# Patient Record
Sex: FEMALE | Race: WHITE | NOT HISPANIC OR LATINO | Employment: OTHER | URBAN - METROPOLITAN AREA
[De-identification: names, ages, dates, MRNs, and addresses within clinical notes are randomized per-mention and may not be internally consistent; named-entity substitution may affect disease eponyms.]

---

## 2017-01-01 ENCOUNTER — PATIENT OUTREACH (OUTPATIENT)
Dept: HEALTH INFORMATION MANAGEMENT | Facility: OTHER | Age: 77
End: 2017-01-01

## 2017-01-01 ENCOUNTER — APPOINTMENT (OUTPATIENT)
Dept: RADIOLOGY | Facility: MEDICAL CENTER | Age: 77
End: 2017-01-01
Attending: INTERNAL MEDICINE
Payer: MEDICARE

## 2017-01-01 ENCOUNTER — APPOINTMENT (OUTPATIENT)
Dept: RADIOLOGY | Facility: MEDICAL CENTER | Age: 77
DRG: 189 | End: 2017-01-01
Attending: EMERGENCY MEDICINE
Payer: MEDICARE

## 2017-01-01 ENCOUNTER — RESOLUTE PROFESSIONAL BILLING HOSPITAL PROF FEE (OUTPATIENT)
Dept: HOSPITALIST | Facility: MEDICAL CENTER | Age: 77
End: 2017-01-01
Payer: MEDICARE

## 2017-01-01 ENCOUNTER — HOSPITAL ENCOUNTER (INPATIENT)
Facility: MEDICAL CENTER | Age: 77
LOS: 2 days | DRG: 871 | End: 2017-11-06
Attending: EMERGENCY MEDICINE | Admitting: INTERNAL MEDICINE
Payer: MEDICARE

## 2017-01-01 ENCOUNTER — APPOINTMENT (OUTPATIENT)
Dept: RADIOLOGY | Facility: MEDICAL CENTER | Age: 77
DRG: 871 | End: 2017-01-01
Attending: EMERGENCY MEDICINE
Payer: MEDICARE

## 2017-01-01 ENCOUNTER — OFFICE VISIT (OUTPATIENT)
Dept: PULMONOLOGY | Facility: HOSPICE | Age: 77
End: 2017-01-01
Payer: MEDICARE

## 2017-01-01 ENCOUNTER — APPOINTMENT (OUTPATIENT)
Dept: RADIOLOGY | Facility: MEDICAL CENTER | Age: 77
DRG: 871 | End: 2017-01-01
Attending: FAMILY MEDICINE
Payer: MEDICARE

## 2017-01-01 ENCOUNTER — HOSPITAL ENCOUNTER (OUTPATIENT)
Dept: LAB | Facility: MEDICAL CENTER | Age: 77
End: 2017-08-17
Attending: FAMILY MEDICINE
Payer: MEDICARE

## 2017-01-01 ENCOUNTER — HOSPITAL ENCOUNTER (INPATIENT)
Facility: MEDICAL CENTER | Age: 77
LOS: 7 days | DRG: 190 | End: 2017-09-15
Attending: EMERGENCY MEDICINE | Admitting: INTERNAL MEDICINE
Payer: MEDICARE

## 2017-01-01 ENCOUNTER — HOME HEALTH ADMISSION (OUTPATIENT)
Dept: HOME HEALTH SERVICES | Facility: HOME HEALTHCARE | Age: 77
End: 2017-01-01
Payer: MEDICARE

## 2017-01-01 ENCOUNTER — OFFICE VISIT (OUTPATIENT)
Dept: MEDICAL GROUP | Facility: PHYSICIAN GROUP | Age: 77
End: 2017-01-01
Payer: MEDICARE

## 2017-01-01 ENCOUNTER — APPOINTMENT (OUTPATIENT)
Dept: RADIOLOGY | Facility: MEDICAL CENTER | Age: 77
End: 2017-01-01
Attending: EMERGENCY MEDICINE
Payer: MEDICARE

## 2017-01-01 ENCOUNTER — NON-PROVIDER VISIT (OUTPATIENT)
Dept: PULMONOLOGY | Facility: HOSPICE | Age: 77
End: 2017-01-01
Payer: MEDICARE

## 2017-01-01 ENCOUNTER — HOSPITAL ENCOUNTER (OUTPATIENT)
Facility: MEDICAL CENTER | Age: 77
End: 2017-10-29
Attending: EMERGENCY MEDICINE | Admitting: INTERNAL MEDICINE
Payer: MEDICARE

## 2017-01-01 ENCOUNTER — HOME CARE VISIT (OUTPATIENT)
Dept: HOME HEALTH SERVICES | Facility: HOME HEALTHCARE | Age: 77
End: 2017-01-01

## 2017-01-01 ENCOUNTER — APPOINTMENT (OUTPATIENT)
Dept: RADIOLOGY | Facility: MEDICAL CENTER | Age: 77
DRG: 190 | End: 2017-01-01
Attending: EMERGENCY MEDICINE
Payer: MEDICARE

## 2017-01-01 ENCOUNTER — HOSPITAL ENCOUNTER (OUTPATIENT)
Dept: LAB | Facility: MEDICAL CENTER | Age: 77
End: 2017-08-11
Attending: FAMILY MEDICINE
Payer: MEDICARE

## 2017-01-01 ENCOUNTER — TELEPHONE (OUTPATIENT)
Dept: PULMONOLOGY | Facility: HOSPICE | Age: 77
End: 2017-01-01

## 2017-01-01 ENCOUNTER — HOSPITAL ENCOUNTER (INPATIENT)
Facility: MEDICAL CENTER | Age: 77
LOS: 4 days | DRG: 189 | End: 2017-09-07
Attending: EMERGENCY MEDICINE | Admitting: HOSPITALIST
Payer: MEDICARE

## 2017-01-01 ENCOUNTER — APPOINTMENT (OUTPATIENT)
Dept: RADIOLOGY | Facility: MEDICAL CENTER | Age: 77
DRG: 189 | End: 2017-01-01
Attending: HOSPITALIST
Payer: MEDICARE

## 2017-01-01 ENCOUNTER — TELEPHONE (OUTPATIENT)
Dept: MEDICAL GROUP | Facility: PHYSICIAN GROUP | Age: 77
End: 2017-01-01

## 2017-01-01 ENCOUNTER — TELEPHONE (OUTPATIENT)
Dept: MEDICAL GROUP | Facility: LAB | Age: 77
End: 2017-01-01

## 2017-01-01 ENCOUNTER — TELEPHONE (OUTPATIENT)
Dept: HOSPITALIST | Facility: MEDICAL CENTER | Age: 77
End: 2017-01-01

## 2017-01-01 ENCOUNTER — APPOINTMENT (OUTPATIENT)
Dept: RADIOLOGY | Facility: MEDICAL CENTER | Age: 77
DRG: 190 | End: 2017-01-01
Attending: INTERNAL MEDICINE
Payer: MEDICARE

## 2017-01-01 ENCOUNTER — HOSPITAL ENCOUNTER (EMERGENCY)
Facility: MEDICAL CENTER | Age: 77
DRG: 871 | End: 2017-11-01
Attending: EMERGENCY MEDICINE
Payer: MEDICARE

## 2017-01-01 ENCOUNTER — TELEPHONE (OUTPATIENT)
Dept: MEDICAL GROUP | Facility: MEDICAL CENTER | Age: 77
End: 2017-01-01

## 2017-01-01 VITALS
HEART RATE: 71 BPM | RESPIRATION RATE: 12 BRPM | BODY MASS INDEX: 17.74 KG/M2 | HEIGHT: 67 IN | DIASTOLIC BLOOD PRESSURE: 82 MMHG | WEIGHT: 113 LBS | SYSTOLIC BLOOD PRESSURE: 126 MMHG

## 2017-01-01 VITALS
WEIGHT: 114 LBS | HEART RATE: 88 BPM | TEMPERATURE: 99.2 F | BODY MASS INDEX: 18.99 KG/M2 | SYSTOLIC BLOOD PRESSURE: 150 MMHG | DIASTOLIC BLOOD PRESSURE: 90 MMHG | RESPIRATION RATE: 18 BRPM | OXYGEN SATURATION: 90 % | HEIGHT: 65 IN

## 2017-01-01 VITALS
BODY MASS INDEX: 18.53 KG/M2 | WEIGHT: 115.3 LBS | TEMPERATURE: 100 F | HEIGHT: 66 IN | OXYGEN SATURATION: 97 % | SYSTOLIC BLOOD PRESSURE: 120 MMHG | DIASTOLIC BLOOD PRESSURE: 70 MMHG | HEART RATE: 70 BPM | RESPIRATION RATE: 16 BRPM

## 2017-01-01 VITALS
OXYGEN SATURATION: 95 % | HEART RATE: 92 BPM | RESPIRATION RATE: 18 BRPM | BODY MASS INDEX: 18.66 KG/M2 | TEMPERATURE: 98.7 F | SYSTOLIC BLOOD PRESSURE: 170 MMHG | DIASTOLIC BLOOD PRESSURE: 80 MMHG | WEIGHT: 112 LBS | HEIGHT: 65 IN

## 2017-01-01 VITALS
HEIGHT: 65 IN | OXYGEN SATURATION: 95 % | SYSTOLIC BLOOD PRESSURE: 182 MMHG | RESPIRATION RATE: 16 BRPM | HEART RATE: 81 BPM | WEIGHT: 113 LBS | DIASTOLIC BLOOD PRESSURE: 100 MMHG | TEMPERATURE: 97.2 F | BODY MASS INDEX: 18.83 KG/M2

## 2017-01-01 VITALS
BODY MASS INDEX: 17.06 KG/M2 | WEIGHT: 108.69 LBS | RESPIRATION RATE: 20 BRPM | SYSTOLIC BLOOD PRESSURE: 147 MMHG | TEMPERATURE: 97.2 F | HEIGHT: 67 IN | HEART RATE: 93 BPM | OXYGEN SATURATION: 95 % | DIASTOLIC BLOOD PRESSURE: 69 MMHG

## 2017-01-01 VITALS
DIASTOLIC BLOOD PRESSURE: 72 MMHG | RESPIRATION RATE: 14 BRPM | HEART RATE: 69 BPM | BODY MASS INDEX: 17.68 KG/M2 | SYSTOLIC BLOOD PRESSURE: 124 MMHG | HEIGHT: 66 IN | TEMPERATURE: 97.3 F | WEIGHT: 110 LBS | OXYGEN SATURATION: 96 %

## 2017-01-01 VITALS
WEIGHT: 116 LBS | SYSTOLIC BLOOD PRESSURE: 130 MMHG | HEIGHT: 65 IN | HEART RATE: 66 BPM | OXYGEN SATURATION: 95 % | DIASTOLIC BLOOD PRESSURE: 80 MMHG | TEMPERATURE: 98 F | BODY MASS INDEX: 19.33 KG/M2

## 2017-01-01 VITALS
TEMPERATURE: 98.3 F | HEART RATE: 95 BPM | WEIGHT: 100 LBS | RESPIRATION RATE: 24 BRPM | HEIGHT: 67 IN | SYSTOLIC BLOOD PRESSURE: 144 MMHG | BODY MASS INDEX: 15.7 KG/M2 | OXYGEN SATURATION: 99 % | DIASTOLIC BLOOD PRESSURE: 74 MMHG

## 2017-01-01 VITALS
DIASTOLIC BLOOD PRESSURE: 87 MMHG | HEART RATE: 96 BPM | TEMPERATURE: 98.7 F | BODY MASS INDEX: 16.85 KG/M2 | WEIGHT: 107.58 LBS | OXYGEN SATURATION: 96 % | SYSTOLIC BLOOD PRESSURE: 151 MMHG | RESPIRATION RATE: 16 BRPM

## 2017-01-01 VITALS
BODY MASS INDEX: 16.68 KG/M2 | HEIGHT: 67 IN | WEIGHT: 106.26 LBS | SYSTOLIC BLOOD PRESSURE: 126 MMHG | RESPIRATION RATE: 18 BRPM | TEMPERATURE: 98.9 F | OXYGEN SATURATION: 91 % | DIASTOLIC BLOOD PRESSURE: 61 MMHG | HEART RATE: 87 BPM

## 2017-01-01 VITALS
HEART RATE: 114 BPM | RESPIRATION RATE: 30 BRPM | DIASTOLIC BLOOD PRESSURE: 48 MMHG | OXYGEN SATURATION: 80 % | WEIGHT: 95.24 LBS | TEMPERATURE: 97 F | SYSTOLIC BLOOD PRESSURE: 90 MMHG | HEIGHT: 67 IN | BODY MASS INDEX: 14.95 KG/M2

## 2017-01-01 VITALS
WEIGHT: 104.72 LBS | OXYGEN SATURATION: 99 % | BODY MASS INDEX: 17.45 KG/M2 | DIASTOLIC BLOOD PRESSURE: 60 MMHG | HEIGHT: 65 IN | SYSTOLIC BLOOD PRESSURE: 140 MMHG | HEART RATE: 93 BPM | TEMPERATURE: 98 F

## 2017-01-01 VITALS — WEIGHT: 110 LBS | HEIGHT: 66 IN | BODY MASS INDEX: 17.68 KG/M2

## 2017-01-01 DIAGNOSIS — I10 ESSENTIAL HYPERTENSION: Chronic | ICD-10-CM

## 2017-01-01 DIAGNOSIS — J44.1 COPD EXACERBATION (HCC): ICD-10-CM

## 2017-01-01 DIAGNOSIS — I73.9 CLAUDICATION (HCC): ICD-10-CM

## 2017-01-01 DIAGNOSIS — F41.9 ANXIETY: ICD-10-CM

## 2017-01-01 DIAGNOSIS — J44.9 CHRONIC OBSTRUCTIVE PULMONARY DISEASE, UNSPECIFIED COPD TYPE (HCC): Chronic | ICD-10-CM

## 2017-01-01 DIAGNOSIS — M81.0 OSTEOPOROSIS, POST-MENOPAUSAL: ICD-10-CM

## 2017-01-01 DIAGNOSIS — J96.11 CHRONIC RESPIRATORY FAILURE WITH HYPOXIA (HCC): ICD-10-CM

## 2017-01-01 DIAGNOSIS — E87.29 COMPENSATED RESPIRATORY ACIDOSIS: ICD-10-CM

## 2017-01-01 DIAGNOSIS — I10 ESSENTIAL HYPERTENSION: ICD-10-CM

## 2017-01-01 DIAGNOSIS — J44.1 CHRONIC OBSTRUCTIVE PULMONARY DISEASE WITH ACUTE EXACERBATION (HCC): ICD-10-CM

## 2017-01-01 DIAGNOSIS — R41.0 DELIRIUM: ICD-10-CM

## 2017-01-01 DIAGNOSIS — F17.200 CURRENT SMOKER: Chronic | ICD-10-CM

## 2017-01-01 DIAGNOSIS — E78.5 DYSLIPIDEMIA: ICD-10-CM

## 2017-01-01 DIAGNOSIS — I48.20 CHRONIC ATRIAL FIBRILLATION (HCC): ICD-10-CM

## 2017-01-01 DIAGNOSIS — J44.9 CHRONIC OBSTRUCTIVE PULMONARY DISEASE, UNSPECIFIED COPD TYPE (HCC): ICD-10-CM

## 2017-01-01 DIAGNOSIS — N18.30 CHRONIC KIDNEY DISEASE (CKD), STAGE III (MODERATE) (HCC): Chronic | ICD-10-CM

## 2017-01-01 DIAGNOSIS — J18.9 PNEUMONIA OF RIGHT MIDDLE LOBE DUE TO INFECTIOUS ORGANISM: ICD-10-CM

## 2017-01-01 DIAGNOSIS — I48.91 ATRIAL FIBRILLATION, UNSPECIFIED TYPE (HCC): ICD-10-CM

## 2017-01-01 DIAGNOSIS — R44.3 HALLUCINATION: ICD-10-CM

## 2017-01-01 DIAGNOSIS — Z23 NEED FOR TDAP VACCINATION: ICD-10-CM

## 2017-01-01 DIAGNOSIS — E78.5 HYPERLIPIDEMIA, UNSPECIFIED HYPERLIPIDEMIA TYPE: Chronic | ICD-10-CM

## 2017-01-01 DIAGNOSIS — J44.9 COPD, SEVERE (HCC): ICD-10-CM

## 2017-01-01 DIAGNOSIS — J44.1 ACUTE EXACERBATION OF CHRONIC OBSTRUCTIVE PULMONARY DISEASE (COPD) (HCC): ICD-10-CM

## 2017-01-01 DIAGNOSIS — I73.9 PERIPHERAL VASCULAR DISEASE (HCC): Chronic | ICD-10-CM

## 2017-01-01 DIAGNOSIS — J44.9 END STAGE COPD (HCC): ICD-10-CM

## 2017-01-01 DIAGNOSIS — E87.6 HYPOKALEMIA: ICD-10-CM

## 2017-01-01 DIAGNOSIS — D72.829 LEUKOCYTOSIS, UNSPECIFIED TYPE: ICD-10-CM

## 2017-01-01 DIAGNOSIS — J96.12 CHRONIC RESPIRATORY FAILURE WITH HYPOXIA AND HYPERCAPNIA (HCC): ICD-10-CM

## 2017-01-01 DIAGNOSIS — Z99.81 OXYGEN DEPENDENT: ICD-10-CM

## 2017-01-01 DIAGNOSIS — J96.11 CHRONIC RESPIRATORY FAILURE WITH HYPOXIA AND HYPERCAPNIA (HCC): ICD-10-CM

## 2017-01-01 DIAGNOSIS — R06.00 DYSPNEA, UNSPECIFIED TYPE: ICD-10-CM

## 2017-01-01 DIAGNOSIS — Z00.00 MEDICARE ANNUAL WELLNESS VISIT, SUBSEQUENT: ICD-10-CM

## 2017-01-01 DIAGNOSIS — J96.21 ACUTE ON CHRONIC RESPIRATORY FAILURE WITH HYPOXEMIA (HCC): ICD-10-CM

## 2017-01-01 DIAGNOSIS — J44.0 CHRONIC OBSTRUCTIVE PULMONARY DISEASE WITH ACUTE LOWER RESPIRATORY INFECTION (HCC): ICD-10-CM

## 2017-01-01 LAB
25(OH)D3 SERPL-MCNC: 36 NG/ML (ref 30–100)
ALBUMIN SERPL BCP-MCNC: 3.2 G/DL (ref 3.2–4.9)
ALBUMIN SERPL BCP-MCNC: 3.2 G/DL (ref 3.2–4.9)
ALBUMIN SERPL BCP-MCNC: 3.3 G/DL (ref 3.2–4.9)
ALBUMIN SERPL BCP-MCNC: 3.7 G/DL (ref 3.2–4.9)
ALBUMIN SERPL BCP-MCNC: 3.8 G/DL (ref 3.2–4.9)
ALBUMIN SERPL BCP-MCNC: 3.9 G/DL (ref 3.2–4.9)
ALBUMIN/GLOB SERPL: 1.5 G/DL
ALBUMIN/GLOB SERPL: 1.6 G/DL
ALBUMIN/GLOB SERPL: 1.7 G/DL
ALBUMIN/GLOB SERPL: 1.7 G/DL
ALBUMIN/GLOB SERPL: 1.9 G/DL
ALP SERPL-CCNC: 21 U/L (ref 30–99)
ALP SERPL-CCNC: 22 U/L (ref 30–99)
ALP SERPL-CCNC: 25 U/L (ref 30–99)
ALP SERPL-CCNC: 26 U/L (ref 30–99)
ALP SERPL-CCNC: 26 U/L (ref 30–99)
ALP SERPL-CCNC: 28 U/L (ref 30–99)
ALP SERPL-CCNC: 31 U/L (ref 30–99)
ALP SERPL-CCNC: 32 U/L (ref 30–99)
ALT SERPL-CCNC: 10 U/L (ref 2–50)
ALT SERPL-CCNC: 16 U/L (ref 2–50)
ALT SERPL-CCNC: 16 U/L (ref 2–50)
ALT SERPL-CCNC: 21 U/L (ref 2–50)
ALT SERPL-CCNC: 28 U/L (ref 2–50)
ALT SERPL-CCNC: 8 U/L (ref 2–50)
ALT SERPL-CCNC: 9 U/L (ref 2–50)
ALT SERPL-CCNC: 9 U/L (ref 2–50)
ANION GAP SERPL CALC-SCNC: 10 MMOL/L (ref 0–11.9)
ANION GAP SERPL CALC-SCNC: 11 MMOL/L (ref 0–11.9)
ANION GAP SERPL CALC-SCNC: 11 MMOL/L (ref 0–11.9)
ANION GAP SERPL CALC-SCNC: 12 MMOL/L (ref 0–11.9)
ANION GAP SERPL CALC-SCNC: 4 MMOL/L (ref 0–11.9)
ANION GAP SERPL CALC-SCNC: 5 MMOL/L (ref 0–11.9)
ANION GAP SERPL CALC-SCNC: 5 MMOL/L (ref 0–11.9)
ANION GAP SERPL CALC-SCNC: 6 MMOL/L (ref 0–11.9)
ANION GAP SERPL CALC-SCNC: 7 MMOL/L (ref 0–11.9)
APPEARANCE UR: CLEAR
APPEARANCE UR: CLEAR
APTT PPP: 23.9 SEC (ref 24.7–36)
AST SERPL-CCNC: 12 U/L (ref 12–45)
AST SERPL-CCNC: 15 U/L (ref 12–45)
AST SERPL-CCNC: 16 U/L (ref 12–45)
AST SERPL-CCNC: 16 U/L (ref 12–45)
AST SERPL-CCNC: 20 U/L (ref 12–45)
AST SERPL-CCNC: 24 U/L (ref 12–45)
AST SERPL-CCNC: 27 U/L (ref 12–45)
AST SERPL-CCNC: 28 U/L (ref 12–45)
BACTERIA BLD CULT: ABNORMAL
BACTERIA BLD CULT: ABNORMAL
BACTERIA BLD CULT: NORMAL
BACTERIA UR CULT: NORMAL
BACTERIA UR CULT: NORMAL
BASE EXCESS BLDV CALC-SCNC: 0 MMOL/L
BASOPHILS # BLD AUTO: 0.1 % (ref 0–1.8)
BASOPHILS # BLD AUTO: 0.2 % (ref 0–1.8)
BASOPHILS # BLD AUTO: 0.4 % (ref 0–1.8)
BASOPHILS # BLD AUTO: 0.7 % (ref 0–1.8)
BASOPHILS # BLD: 0.01 K/UL (ref 0–0.12)
BASOPHILS # BLD: 0.02 K/UL (ref 0–0.12)
BASOPHILS # BLD: 0.03 K/UL (ref 0–0.12)
BASOPHILS # BLD: 0.03 K/UL (ref 0–0.12)
BASOPHILS # BLD: 0.07 K/UL (ref 0–0.12)
BILIRUB SERPL-MCNC: 0.5 MG/DL (ref 0.1–1.5)
BILIRUB SERPL-MCNC: 0.6 MG/DL (ref 0.1–1.5)
BILIRUB SERPL-MCNC: 0.7 MG/DL (ref 0.1–1.5)
BILIRUB SERPL-MCNC: 0.8 MG/DL (ref 0.1–1.5)
BILIRUB UR QL STRIP.AUTO: NEGATIVE
BILIRUB UR QL STRIP.AUTO: NEGATIVE
BNP SERPL-MCNC: 173 PG/ML (ref 0–100)
BNP SERPL-MCNC: 223 PG/ML (ref 0–100)
BODY TEMPERATURE: ABNORMAL CENTIGRADE
BUN SERPL-MCNC: 11 MG/DL (ref 8–22)
BUN SERPL-MCNC: 11 MG/DL (ref 8–22)
BUN SERPL-MCNC: 12 MG/DL (ref 8–22)
BUN SERPL-MCNC: 15 MG/DL (ref 8–22)
BUN SERPL-MCNC: 16 MG/DL (ref 8–22)
BUN SERPL-MCNC: 20 MG/DL (ref 8–22)
BUN SERPL-MCNC: 23 MG/DL (ref 8–22)
BUN SERPL-MCNC: 23 MG/DL (ref 8–22)
BUN SERPL-MCNC: 24 MG/DL (ref 8–22)
BUN SERPL-MCNC: 25 MG/DL (ref 8–22)
BUN SERPL-MCNC: 27 MG/DL (ref 8–22)
BUN SERPL-MCNC: 7 MG/DL (ref 8–22)
BUN SERPL-MCNC: 9 MG/DL (ref 8–22)
BUN SERPL-MCNC: 9 MG/DL (ref 8–22)
CALCIUM SERPL-MCNC: 8.2 MG/DL (ref 8.5–10.5)
CALCIUM SERPL-MCNC: 8.4 MG/DL (ref 8.5–10.5)
CALCIUM SERPL-MCNC: 8.6 MG/DL (ref 8.5–10.5)
CALCIUM SERPL-MCNC: 8.6 MG/DL (ref 8.5–10.5)
CALCIUM SERPL-MCNC: 8.7 MG/DL (ref 8.5–10.5)
CALCIUM SERPL-MCNC: 8.8 MG/DL (ref 8.5–10.5)
CALCIUM SERPL-MCNC: 8.8 MG/DL (ref 8.5–10.5)
CALCIUM SERPL-MCNC: 9.1 MG/DL (ref 8.5–10.5)
CALCIUM SERPL-MCNC: 9.2 MG/DL (ref 8.5–10.5)
CALCIUM SERPL-MCNC: 9.2 MG/DL (ref 8.5–10.5)
CALCIUM SERPL-MCNC: 9.4 MG/DL (ref 8.5–10.5)
CALCIUM SERPL-MCNC: 9.6 MG/DL (ref 8.5–10.5)
CALCIUM SERPL-MCNC: 9.7 MG/DL (ref 8.5–10.5)
CALCIUM SERPL-MCNC: 9.8 MG/DL (ref 8.5–10.5)
CHLORIDE SERPL-SCNC: 101 MMOL/L (ref 96–112)
CHLORIDE SERPL-SCNC: 102 MMOL/L (ref 96–112)
CHLORIDE SERPL-SCNC: 106 MMOL/L (ref 96–112)
CHLORIDE SERPL-SCNC: 109 MMOL/L (ref 96–112)
CHLORIDE SERPL-SCNC: 109 MMOL/L (ref 96–112)
CHLORIDE SERPL-SCNC: 84 MMOL/L (ref 96–112)
CHLORIDE SERPL-SCNC: 89 MMOL/L (ref 96–112)
CHLORIDE SERPL-SCNC: 89 MMOL/L (ref 96–112)
CHLORIDE SERPL-SCNC: 91 MMOL/L (ref 96–112)
CHLORIDE SERPL-SCNC: 94 MMOL/L (ref 96–112)
CHLORIDE SERPL-SCNC: 94 MMOL/L (ref 96–112)
CHLORIDE SERPL-SCNC: 95 MMOL/L (ref 96–112)
CHLORIDE SERPL-SCNC: 97 MMOL/L (ref 96–112)
CHLORIDE SERPL-SCNC: 98 MMOL/L (ref 96–112)
CHOLEST SERPL-MCNC: 172 MG/DL (ref 100–199)
CHOLEST SERPL-MCNC: 196 MG/DL (ref 100–199)
CO2 SERPL-SCNC: 20 MMOL/L (ref 20–33)
CO2 SERPL-SCNC: 21 MMOL/L (ref 20–33)
CO2 SERPL-SCNC: 22 MMOL/L (ref 20–33)
CO2 SERPL-SCNC: 22 MMOL/L (ref 20–33)
CO2 SERPL-SCNC: 24 MMOL/L (ref 20–33)
CO2 SERPL-SCNC: 26 MMOL/L (ref 20–33)
CO2 SERPL-SCNC: 29 MMOL/L (ref 20–33)
CO2 SERPL-SCNC: 35 MMOL/L (ref 20–33)
CO2 SERPL-SCNC: 35 MMOL/L (ref 20–33)
CO2 SERPL-SCNC: 41 MMOL/L (ref 20–33)
CO2 SERPL-SCNC: 41 MMOL/L (ref 20–33)
CO2 SERPL-SCNC: 42 MMOL/L (ref 20–33)
COLOR UR: YELLOW
COLOR UR: YELLOW
CREAT SERPL-MCNC: 0.55 MG/DL (ref 0.5–1.4)
CREAT SERPL-MCNC: 0.55 MG/DL (ref 0.5–1.4)
CREAT SERPL-MCNC: 0.56 MG/DL (ref 0.5–1.4)
CREAT SERPL-MCNC: 0.67 MG/DL (ref 0.5–1.4)
CREAT SERPL-MCNC: 0.72 MG/DL (ref 0.5–1.4)
CREAT SERPL-MCNC: 0.73 MG/DL (ref 0.5–1.4)
CREAT SERPL-MCNC: 0.74 MG/DL (ref 0.5–1.4)
CREAT SERPL-MCNC: 0.75 MG/DL (ref 0.5–1.4)
CREAT SERPL-MCNC: 0.83 MG/DL (ref 0.5–1.4)
CREAT SERPL-MCNC: 0.85 MG/DL (ref 0.5–1.4)
CREAT SERPL-MCNC: 0.88 MG/DL (ref 0.5–1.4)
CREAT SERPL-MCNC: 0.93 MG/DL (ref 0.5–1.4)
DEPRECATED D DIMER PPP IA-ACNC: <200 NG/ML(D-DU)
EKG IMPRESSION: NORMAL
EOSINOPHIL # BLD AUTO: 0 K/UL (ref 0–0.51)
EOSINOPHIL # BLD AUTO: 0.01 K/UL (ref 0–0.51)
EOSINOPHIL # BLD AUTO: 0.03 K/UL (ref 0–0.51)
EOSINOPHIL # BLD AUTO: 0.04 K/UL (ref 0–0.51)
EOSINOPHIL # BLD AUTO: 0.15 K/UL (ref 0–0.51)
EOSINOPHIL # BLD AUTO: 0.18 K/UL (ref 0–0.51)
EOSINOPHIL NFR BLD: 0 % (ref 0–6.9)
EOSINOPHIL NFR BLD: 0.3 % (ref 0–6.9)
EOSINOPHIL NFR BLD: 0.3 % (ref 0–6.9)
EOSINOPHIL NFR BLD: 0.9 % (ref 0–6.9)
EOSINOPHIL NFR BLD: 2.2 % (ref 0–6.9)
ERYTHROCYTE [DISTWIDTH] IN BLOOD BY AUTOMATED COUNT: 37.9 FL (ref 35.9–50)
ERYTHROCYTE [DISTWIDTH] IN BLOOD BY AUTOMATED COUNT: 38.5 FL (ref 35.9–50)
ERYTHROCYTE [DISTWIDTH] IN BLOOD BY AUTOMATED COUNT: 38.7 FL (ref 35.9–50)
ERYTHROCYTE [DISTWIDTH] IN BLOOD BY AUTOMATED COUNT: 38.7 FL (ref 35.9–50)
ERYTHROCYTE [DISTWIDTH] IN BLOOD BY AUTOMATED COUNT: 38.9 FL (ref 35.9–50)
ERYTHROCYTE [DISTWIDTH] IN BLOOD BY AUTOMATED COUNT: 39.2 FL (ref 35.9–50)
ERYTHROCYTE [DISTWIDTH] IN BLOOD BY AUTOMATED COUNT: 39.3 FL (ref 35.9–50)
ERYTHROCYTE [DISTWIDTH] IN BLOOD BY AUTOMATED COUNT: 39.3 FL (ref 35.9–50)
ERYTHROCYTE [DISTWIDTH] IN BLOOD BY AUTOMATED COUNT: 39.9 FL (ref 35.9–50)
ERYTHROCYTE [DISTWIDTH] IN BLOOD BY AUTOMATED COUNT: 40.1 FL (ref 35.9–50)
ERYTHROCYTE [DISTWIDTH] IN BLOOD BY AUTOMATED COUNT: 41.8 FL (ref 35.9–50)
EST. AVERAGE GLUCOSE BLD GHB EST-MCNC: 128 MG/DL
FLUAV+FLUBV AG SPEC QL IA: NORMAL
GFR SERPL CREATININE-BSD FRML MDRD: 58 ML/MIN/1.73 M 2
GFR SERPL CREATININE-BSD FRML MDRD: >60 ML/MIN/1.73 M 2
GLOBULIN SER CALC-MCNC: 1.9 G/DL (ref 1.9–3.5)
GLOBULIN SER CALC-MCNC: 1.9 G/DL (ref 1.9–3.5)
GLOBULIN SER CALC-MCNC: 2 G/DL (ref 1.9–3.5)
GLOBULIN SER CALC-MCNC: 2.1 G/DL (ref 1.9–3.5)
GLOBULIN SER CALC-MCNC: 2.3 G/DL (ref 1.9–3.5)
GLOBULIN SER CALC-MCNC: 2.4 G/DL (ref 1.9–3.5)
GLOBULIN SER CALC-MCNC: 2.4 G/DL (ref 1.9–3.5)
GLOBULIN SER CALC-MCNC: 2.5 G/DL (ref 1.9–3.5)
GLUCOSE BLD-MCNC: 109 MG/DL (ref 65–99)
GLUCOSE BLD-MCNC: 120 MG/DL (ref 65–99)
GLUCOSE BLD-MCNC: 121 MG/DL (ref 65–99)
GLUCOSE BLD-MCNC: 124 MG/DL (ref 65–99)
GLUCOSE BLD-MCNC: 126 MG/DL (ref 65–99)
GLUCOSE BLD-MCNC: 127 MG/DL (ref 65–99)
GLUCOSE BLD-MCNC: 128 MG/DL (ref 65–99)
GLUCOSE BLD-MCNC: 131 MG/DL (ref 65–99)
GLUCOSE BLD-MCNC: 133 MG/DL (ref 65–99)
GLUCOSE BLD-MCNC: 133 MG/DL (ref 65–99)
GLUCOSE BLD-MCNC: 137 MG/DL (ref 65–99)
GLUCOSE BLD-MCNC: 143 MG/DL (ref 65–99)
GLUCOSE BLD-MCNC: 145 MG/DL (ref 65–99)
GLUCOSE BLD-MCNC: 148 MG/DL (ref 65–99)
GLUCOSE BLD-MCNC: 154 MG/DL (ref 65–99)
GLUCOSE BLD-MCNC: 158 MG/DL (ref 65–99)
GLUCOSE BLD-MCNC: 162 MG/DL (ref 65–99)
GLUCOSE BLD-MCNC: 169 MG/DL (ref 65–99)
GLUCOSE BLD-MCNC: 170 MG/DL (ref 65–99)
GLUCOSE BLD-MCNC: 171 MG/DL (ref 65–99)
GLUCOSE BLD-MCNC: 178 MG/DL (ref 65–99)
GLUCOSE BLD-MCNC: 186 MG/DL (ref 65–99)
GLUCOSE BLD-MCNC: 82 MG/DL (ref 65–99)
GLUCOSE BLD-MCNC: 83 MG/DL (ref 65–99)
GLUCOSE BLD-MCNC: 87 MG/DL (ref 65–99)
GLUCOSE BLD-MCNC: 87 MG/DL (ref 65–99)
GLUCOSE SERPL-MCNC: 107 MG/DL (ref 65–99)
GLUCOSE SERPL-MCNC: 109 MG/DL (ref 65–99)
GLUCOSE SERPL-MCNC: 123 MG/DL (ref 65–99)
GLUCOSE SERPL-MCNC: 123 MG/DL (ref 65–99)
GLUCOSE SERPL-MCNC: 124 MG/DL (ref 65–99)
GLUCOSE SERPL-MCNC: 129 MG/DL (ref 65–99)
GLUCOSE SERPL-MCNC: 130 MG/DL (ref 65–99)
GLUCOSE SERPL-MCNC: 131 MG/DL (ref 65–99)
GLUCOSE SERPL-MCNC: 138 MG/DL (ref 65–99)
GLUCOSE SERPL-MCNC: 145 MG/DL (ref 65–99)
GLUCOSE SERPL-MCNC: 152 MG/DL (ref 65–99)
GLUCOSE SERPL-MCNC: 152 MG/DL (ref 65–99)
GLUCOSE SERPL-MCNC: 156 MG/DL (ref 65–99)
GLUCOSE SERPL-MCNC: 99 MG/DL (ref 65–99)
GLUCOSE UR STRIP.AUTO-MCNC: NEGATIVE MG/DL
GLUCOSE UR STRIP.AUTO-MCNC: NEGATIVE MG/DL
HBA1C MFR BLD: 6.1 % (ref 0–5.6)
HCO3 BLDV-SCNC: 25 MMOL/L (ref 24–28)
HCT VFR BLD AUTO: 29.8 % (ref 37–47)
HCT VFR BLD AUTO: 32 % (ref 37–47)
HCT VFR BLD AUTO: 33.4 % (ref 37–47)
HCT VFR BLD AUTO: 33.7 % (ref 37–47)
HCT VFR BLD AUTO: 34.2 % (ref 37–47)
HCT VFR BLD AUTO: 34.4 % (ref 37–47)
HCT VFR BLD AUTO: 36.9 % (ref 37–47)
HCT VFR BLD AUTO: 37.1 % (ref 37–47)
HCT VFR BLD AUTO: 37.2 % (ref 37–47)
HCT VFR BLD AUTO: 39.4 % (ref 37–47)
HCT VFR BLD AUTO: 42.8 % (ref 37–47)
HDLC SERPL-MCNC: 59 MG/DL
HDLC SERPL-MCNC: 62 MG/DL
HGB BLD-MCNC: 10.1 G/DL (ref 12–16)
HGB BLD-MCNC: 10.9 G/DL (ref 12–16)
HGB BLD-MCNC: 11.6 G/DL (ref 12–16)
HGB BLD-MCNC: 11.7 G/DL (ref 12–16)
HGB BLD-MCNC: 11.9 G/DL (ref 12–16)
HGB BLD-MCNC: 12.1 G/DL (ref 12–16)
HGB BLD-MCNC: 12.5 G/DL (ref 12–16)
HGB BLD-MCNC: 12.6 G/DL (ref 12–16)
HGB BLD-MCNC: 12.7 G/DL (ref 12–16)
HGB BLD-MCNC: 13.5 G/DL (ref 12–16)
HGB BLD-MCNC: 14.7 G/DL (ref 12–16)
IMM GRANULOCYTES # BLD AUTO: 0.02 K/UL (ref 0–0.11)
IMM GRANULOCYTES # BLD AUTO: 0.04 K/UL (ref 0–0.11)
IMM GRANULOCYTES # BLD AUTO: 0.04 K/UL (ref 0–0.11)
IMM GRANULOCYTES # BLD AUTO: 0.06 K/UL (ref 0–0.11)
IMM GRANULOCYTES # BLD AUTO: 0.06 K/UL (ref 0–0.11)
IMM GRANULOCYTES # BLD AUTO: 0.07 K/UL (ref 0–0.11)
IMM GRANULOCYTES # BLD AUTO: 0.07 K/UL (ref 0–0.11)
IMM GRANULOCYTES # BLD AUTO: 0.09 K/UL (ref 0–0.11)
IMM GRANULOCYTES # BLD AUTO: 0.1 K/UL (ref 0–0.11)
IMM GRANULOCYTES # BLD AUTO: 0.17 K/UL (ref 0–0.11)
IMM GRANULOCYTES # BLD AUTO: 0.21 K/UL (ref 0–0.11)
IMM GRANULOCYTES NFR BLD AUTO: 0.3 % (ref 0–0.9)
IMM GRANULOCYTES NFR BLD AUTO: 0.4 % (ref 0–0.9)
IMM GRANULOCYTES NFR BLD AUTO: 0.4 % (ref 0–0.9)
IMM GRANULOCYTES NFR BLD AUTO: 0.6 % (ref 0–0.9)
IMM GRANULOCYTES NFR BLD AUTO: 0.7 % (ref 0–0.9)
IMM GRANULOCYTES NFR BLD AUTO: 0.7 % (ref 0–0.9)
IMM GRANULOCYTES NFR BLD AUTO: 0.8 % (ref 0–0.9)
IMM GRANULOCYTES NFR BLD AUTO: 0.8 % (ref 0–0.9)
IMM GRANULOCYTES NFR BLD AUTO: 1 % (ref 0–0.9)
INR PPP: 0.94 (ref 0.87–1.13)
IRON SATN MFR SERPL: 19 % (ref 15–55)
IRON SERPL-MCNC: 55 UG/DL (ref 40–170)
KETONES UR STRIP.AUTO-MCNC: ABNORMAL MG/DL
KETONES UR STRIP.AUTO-MCNC: ABNORMAL MG/DL
LACTATE BLD-SCNC: 1 MMOL/L (ref 0.5–2)
LACTATE BLD-SCNC: 1.2 MMOL/L (ref 0.5–2)
LACTATE BLD-SCNC: 1.3 MMOL/L (ref 0.5–2)
LACTATE BLD-SCNC: 1.5 MMOL/L (ref 0.5–2)
LACTATE BLD-SCNC: 2.4 MMOL/L (ref 0.5–2)
LDLC SERPL CALC-MCNC: 115 MG/DL
LDLC SERPL CALC-MCNC: 96 MG/DL
LEUKOCYTE ESTERASE UR QL STRIP.AUTO: NEGATIVE
LEUKOCYTE ESTERASE UR QL STRIP.AUTO: NEGATIVE
LV EJECT FRACT  99904: 70
LV EJECT FRACT MOD 4C 99902: 69.19
LYMPHOCYTES # BLD AUTO: 0.56 K/UL (ref 1–4.8)
LYMPHOCYTES # BLD AUTO: 0.65 K/UL (ref 1–4.8)
LYMPHOCYTES # BLD AUTO: 0.73 K/UL (ref 1–4.8)
LYMPHOCYTES # BLD AUTO: 0.75 K/UL (ref 1–4.8)
LYMPHOCYTES # BLD AUTO: 0.84 K/UL (ref 1–4.8)
LYMPHOCYTES # BLD AUTO: 0.87 K/UL (ref 1–4.8)
LYMPHOCYTES # BLD AUTO: 1.14 K/UL (ref 1–4.8)
LYMPHOCYTES # BLD AUTO: 1.27 K/UL (ref 1–4.8)
LYMPHOCYTES # BLD AUTO: 1.51 K/UL (ref 1–4.8)
LYMPHOCYTES # BLD AUTO: 1.56 K/UL (ref 1–4.8)
LYMPHOCYTES # BLD AUTO: 2.25 K/UL (ref 1–4.8)
LYMPHOCYTES NFR BLD: 12 % (ref 22–41)
LYMPHOCYTES NFR BLD: 15.6 % (ref 22–41)
LYMPHOCYTES NFR BLD: 15.7 % (ref 22–41)
LYMPHOCYTES NFR BLD: 23 % (ref 22–41)
LYMPHOCYTES NFR BLD: 4.3 % (ref 22–41)
LYMPHOCYTES NFR BLD: 5.7 % (ref 22–41)
LYMPHOCYTES NFR BLD: 5.9 % (ref 22–41)
LYMPHOCYTES NFR BLD: 6.3 % (ref 22–41)
LYMPHOCYTES NFR BLD: 6.4 % (ref 22–41)
LYMPHOCYTES NFR BLD: 7.6 % (ref 22–41)
LYMPHOCYTES NFR BLD: 8.2 % (ref 22–41)
MAGNESIUM SERPL-MCNC: 1.4 MG/DL (ref 1.5–2.5)
MAGNESIUM SERPL-MCNC: 1.5 MG/DL (ref 1.5–2.5)
MAGNESIUM SERPL-MCNC: 1.6 MG/DL (ref 1.5–2.5)
MAGNESIUM SERPL-MCNC: 2.2 MG/DL (ref 1.5–2.5)
MCH RBC QN AUTO: 29.3 PG (ref 27–33)
MCH RBC QN AUTO: 29.5 PG (ref 27–33)
MCH RBC QN AUTO: 29.5 PG (ref 27–33)
MCH RBC QN AUTO: 29.7 PG (ref 27–33)
MCH RBC QN AUTO: 29.8 PG (ref 27–33)
MCH RBC QN AUTO: 30.1 PG (ref 27–33)
MCH RBC QN AUTO: 30.1 PG (ref 27–33)
MCH RBC QN AUTO: 30.2 PG (ref 27–33)
MCH RBC QN AUTO: 30.3 PG (ref 27–33)
MCH RBC QN AUTO: 30.4 PG (ref 27–33)
MCH RBC QN AUTO: 30.6 PG (ref 27–33)
MCHC RBC AUTO-ENTMCNC: 33.6 G/DL (ref 33.6–35)
MCHC RBC AUTO-ENTMCNC: 33.9 G/DL (ref 33.6–35)
MCHC RBC AUTO-ENTMCNC: 34.1 G/DL (ref 33.6–35)
MCHC RBC AUTO-ENTMCNC: 34.1 G/DL (ref 33.6–35)
MCHC RBC AUTO-ENTMCNC: 34.2 G/DL (ref 33.6–35)
MCHC RBC AUTO-ENTMCNC: 34.3 G/DL (ref 33.6–35)
MCHC RBC AUTO-ENTMCNC: 34.3 G/DL (ref 33.6–35)
MCHC RBC AUTO-ENTMCNC: 34.4 G/DL (ref 33.6–35)
MCHC RBC AUTO-ENTMCNC: 34.8 G/DL (ref 33.6–35)
MCHC RBC AUTO-ENTMCNC: 35 G/DL (ref 33.6–35)
MCHC RBC AUTO-ENTMCNC: 35.2 G/DL (ref 33.6–35)
MCV RBC AUTO: 85.8 FL (ref 81.4–97.8)
MCV RBC AUTO: 85.9 FL (ref 81.4–97.8)
MCV RBC AUTO: 86.3 FL (ref 81.4–97.8)
MCV RBC AUTO: 86.4 FL (ref 81.4–97.8)
MCV RBC AUTO: 86.9 FL (ref 81.4–97.8)
MCV RBC AUTO: 87 FL (ref 81.4–97.8)
MCV RBC AUTO: 87 FL (ref 81.4–97.8)
MCV RBC AUTO: 87.1 FL (ref 81.4–97.8)
MCV RBC AUTO: 88.5 FL (ref 81.4–97.8)
MCV RBC AUTO: 89 FL (ref 81.4–97.8)
MCV RBC AUTO: 89.4 FL (ref 81.4–97.8)
MICRO URNS: ABNORMAL
MICRO URNS: ABNORMAL
MONOCYTES # BLD AUTO: 0.09 K/UL (ref 0–0.85)
MONOCYTES # BLD AUTO: 0.12 K/UL (ref 0–0.85)
MONOCYTES # BLD AUTO: 0.15 K/UL (ref 0–0.85)
MONOCYTES # BLD AUTO: 0.27 K/UL (ref 0–0.85)
MONOCYTES # BLD AUTO: 0.5 K/UL (ref 0–0.85)
MONOCYTES # BLD AUTO: 0.56 K/UL (ref 0–0.85)
MONOCYTES # BLD AUTO: 0.63 K/UL (ref 0–0.85)
MONOCYTES # BLD AUTO: 0.8 K/UL (ref 0–0.85)
MONOCYTES # BLD AUTO: 1.32 K/UL (ref 0–0.85)
MONOCYTES # BLD AUTO: 1.39 K/UL (ref 0–0.85)
MONOCYTES # BLD AUTO: 1.54 K/UL (ref 0–0.85)
MONOCYTES NFR BLD AUTO: 0.9 % (ref 0–13.4)
MONOCYTES NFR BLD AUTO: 1.3 % (ref 0–13.4)
MONOCYTES NFR BLD AUTO: 1.4 % (ref 0–13.4)
MONOCYTES NFR BLD AUTO: 2.4 % (ref 0–13.4)
MONOCYTES NFR BLD AUTO: 4.2 % (ref 0–13.4)
MONOCYTES NFR BLD AUTO: 5.8 % (ref 0–13.4)
MONOCYTES NFR BLD AUTO: 6.6 % (ref 0–13.4)
MONOCYTES NFR BLD AUTO: 7.6 % (ref 0–13.4)
MONOCYTES NFR BLD AUTO: 7.6 % (ref 0–13.4)
MONOCYTES NFR BLD AUTO: 9.3 % (ref 0–13.4)
MONOCYTES NFR BLD AUTO: 9.7 % (ref 0–13.4)
NEUTROPHILS # BLD AUTO: 10.44 K/UL (ref 2–7.15)
NEUTROPHILS # BLD AUTO: 10.58 K/UL (ref 2–7.15)
NEUTROPHILS # BLD AUTO: 17.06 K/UL (ref 2–7.15)
NEUTROPHILS # BLD AUTO: 17.68 K/UL (ref 2–7.15)
NEUTROPHILS # BLD AUTO: 4.4 K/UL (ref 2–7.15)
NEUTROPHILS # BLD AUTO: 7.5 K/UL (ref 2–7.15)
NEUTROPHILS # BLD AUTO: 8.02 K/UL (ref 2–7.15)
NEUTROPHILS # BLD AUTO: 8.39 K/UL (ref 2–7.15)
NEUTROPHILS # BLD AUTO: 8.78 K/UL (ref 2–7.15)
NEUTROPHILS # BLD AUTO: 9.55 K/UL (ref 2–7.15)
NEUTROPHILS # BLD AUTO: 9.85 K/UL (ref 2–7.15)
NEUTROPHILS NFR BLD: 64.8 % (ref 44–72)
NEUTROPHILS NFR BLD: 73.6 % (ref 44–72)
NEUTROPHILS NFR BLD: 77.2 % (ref 44–72)
NEUTROPHILS NFR BLD: 79.6 % (ref 44–72)
NEUTROPHILS NFR BLD: 84.7 % (ref 44–72)
NEUTROPHILS NFR BLD: 88.2 % (ref 44–72)
NEUTROPHILS NFR BLD: 88.5 % (ref 44–72)
NEUTROPHILS NFR BLD: 89.3 % (ref 44–72)
NEUTROPHILS NFR BLD: 89.6 % (ref 44–72)
NEUTROPHILS NFR BLD: 91.8 % (ref 44–72)
NEUTROPHILS NFR BLD: 92.5 % (ref 44–72)
NITRITE UR QL STRIP.AUTO: NEGATIVE
NITRITE UR QL STRIP.AUTO: NEGATIVE
NRBC # BLD AUTO: 0 K/UL
NRBC # BLD AUTO: 0.02 K/UL
NRBC BLD AUTO-RTO: 0 /100 WBC
NRBC BLD AUTO-RTO: 0.2 /100 WBC
PCO2 BLDV: 45.1 MMHG (ref 41–51)
PH BLDV: 7.37 [PH] (ref 7.31–7.45)
PH UR STRIP.AUTO: 7.5 [PH]
PH UR STRIP.AUTO: 8 [PH]
PLATELET # BLD AUTO: 173 K/UL (ref 164–446)
PLATELET # BLD AUTO: 190 K/UL (ref 164–446)
PLATELET # BLD AUTO: 192 K/UL (ref 164–446)
PLATELET # BLD AUTO: 204 K/UL (ref 164–446)
PLATELET # BLD AUTO: 225 K/UL (ref 164–446)
PLATELET # BLD AUTO: 226 K/UL (ref 164–446)
PLATELET # BLD AUTO: 226 K/UL (ref 164–446)
PLATELET # BLD AUTO: 229 K/UL (ref 164–446)
PLATELET # BLD AUTO: 232 K/UL (ref 164–446)
PLATELET # BLD AUTO: 308 K/UL (ref 164–446)
PLATELET # BLD AUTO: 309 K/UL (ref 164–446)
PMV BLD AUTO: 8.6 FL (ref 9–12.9)
PMV BLD AUTO: 8.7 FL (ref 9–12.9)
PMV BLD AUTO: 8.8 FL (ref 9–12.9)
PMV BLD AUTO: 9.1 FL (ref 9–12.9)
PMV BLD AUTO: 9.2 FL (ref 9–12.9)
PMV BLD AUTO: 9.3 FL (ref 9–12.9)
PMV BLD AUTO: 9.3 FL (ref 9–12.9)
PMV BLD AUTO: 9.6 FL (ref 9–12.9)
PMV BLD AUTO: 9.7 FL (ref 9–12.9)
PO2 BLDV: 23.2 MMHG (ref 25–40)
POTASSIUM SERPL-SCNC: 2.6 MMOL/L (ref 3.6–5.5)
POTASSIUM SERPL-SCNC: 3.1 MMOL/L (ref 3.6–5.5)
POTASSIUM SERPL-SCNC: 3.3 MMOL/L (ref 3.6–5.5)
POTASSIUM SERPL-SCNC: 3.4 MMOL/L (ref 3.6–5.5)
POTASSIUM SERPL-SCNC: 3.5 MMOL/L (ref 3.6–5.5)
POTASSIUM SERPL-SCNC: 3.6 MMOL/L (ref 3.6–5.5)
POTASSIUM SERPL-SCNC: 3.7 MMOL/L (ref 3.6–5.5)
POTASSIUM SERPL-SCNC: 3.8 MMOL/L (ref 3.6–5.5)
POTASSIUM SERPL-SCNC: 3.9 MMOL/L (ref 3.6–5.5)
POTASSIUM SERPL-SCNC: 4.1 MMOL/L (ref 3.6–5.5)
POTASSIUM SERPL-SCNC: 4.1 MMOL/L (ref 3.6–5.5)
POTASSIUM SERPL-SCNC: 4.4 MMOL/L (ref 3.6–5.5)
POTASSIUM SERPL-SCNC: 4.6 MMOL/L (ref 3.6–5.5)
POTASSIUM SERPL-SCNC: 4.7 MMOL/L (ref 3.6–5.5)
PROCALCITONIN SERPL-MCNC: <0.05 NG/ML
PROT SERPL-MCNC: 5.1 G/DL (ref 6–8.2)
PROT SERPL-MCNC: 5.1 G/DL (ref 6–8.2)
PROT SERPL-MCNC: 5.4 G/DL (ref 6–8.2)
PROT SERPL-MCNC: 5.7 G/DL (ref 6–8.2)
PROT SERPL-MCNC: 6 G/DL (ref 6–8.2)
PROT SERPL-MCNC: 6.1 G/DL (ref 6–8.2)
PROT SERPL-MCNC: 6.2 G/DL (ref 6–8.2)
PROT SERPL-MCNC: 6.4 G/DL (ref 6–8.2)
PROT UR QL STRIP: NEGATIVE MG/DL
PROT UR QL STRIP: NEGATIVE MG/DL
PROTHROMBIN TIME: 12.3 SEC (ref 12–14.6)
RBC # BLD AUTO: 3.35 M/UL (ref 4.2–5.4)
RBC # BLD AUTO: 3.58 M/UL (ref 4.2–5.4)
RBC # BLD AUTO: 3.89 M/UL (ref 4.2–5.4)
RBC # BLD AUTO: 3.9 M/UL (ref 4.2–5.4)
RBC # BLD AUTO: 3.93 M/UL (ref 4.2–5.4)
RBC # BLD AUTO: 3.96 M/UL (ref 4.2–5.4)
RBC # BLD AUTO: 4.17 M/UL (ref 4.2–5.4)
RBC # BLD AUTO: 4.27 M/UL (ref 4.2–5.4)
RBC # BLD AUTO: 4.3 M/UL (ref 4.2–5.4)
RBC # BLD AUTO: 4.53 M/UL (ref 4.2–5.4)
RBC # BLD AUTO: 4.99 M/UL (ref 4.2–5.4)
RBC UR QL AUTO: NEGATIVE
RBC UR QL AUTO: NEGATIVE
SAO2 % BLDV: 39 %
SIGNIFICANT IND 70042: ABNORMAL
SIGNIFICANT IND 70042: NORMAL
SITE SITE: ABNORMAL
SITE SITE: NORMAL
SODIUM SERPL-SCNC: 128 MMOL/L (ref 135–145)
SODIUM SERPL-SCNC: 129 MMOL/L (ref 135–145)
SODIUM SERPL-SCNC: 129 MMOL/L (ref 135–145)
SODIUM SERPL-SCNC: 130 MMOL/L (ref 135–145)
SODIUM SERPL-SCNC: 131 MMOL/L (ref 135–145)
SODIUM SERPL-SCNC: 131 MMOL/L (ref 135–145)
SODIUM SERPL-SCNC: 132 MMOL/L (ref 135–145)
SODIUM SERPL-SCNC: 133 MMOL/L (ref 135–145)
SODIUM SERPL-SCNC: 133 MMOL/L (ref 135–145)
SODIUM SERPL-SCNC: 134 MMOL/L (ref 135–145)
SODIUM SERPL-SCNC: 134 MMOL/L (ref 135–145)
SODIUM SERPL-SCNC: 136 MMOL/L (ref 135–145)
SODIUM SERPL-SCNC: 139 MMOL/L (ref 135–145)
SODIUM SERPL-SCNC: 141 MMOL/L (ref 135–145)
SOURCE SOURCE: ABNORMAL
SOURCE SOURCE: NORMAL
SP GR UR STRIP.AUTO: 1.01
SP GR UR STRIP.AUTO: 1.01
TIBC SERPL-MCNC: 291 UG/DL (ref 250–450)
TRIGL SERPL-MCNC: 112 MG/DL (ref 0–149)
TRIGL SERPL-MCNC: 70 MG/DL (ref 0–149)
TROPONIN I SERPL-MCNC: <0.01 NG/ML (ref 0–0.04)
TSH SERPL DL<=0.005 MIU/L-ACNC: 1.33 UIU/ML (ref 0.3–3.7)
TSH SERPL DL<=0.005 MIU/L-ACNC: 1.53 UIU/ML (ref 0.3–3.7)
UROBILINOGEN UR STRIP.AUTO-MCNC: 0.2 MG/DL
UROBILINOGEN UR STRIP.AUTO-MCNC: 0.2 MG/DL
WBC # BLD AUTO: 10.4 K/UL (ref 4.8–10.8)
WBC # BLD AUTO: 10.5 K/UL (ref 4.8–10.8)
WBC # BLD AUTO: 11 K/UL (ref 4.8–10.8)
WBC # BLD AUTO: 11.8 K/UL (ref 4.8–10.8)
WBC # BLD AUTO: 14.4 K/UL (ref 4.8–10.8)
WBC # BLD AUTO: 20.1 K/UL (ref 4.8–10.8)
WBC # BLD AUTO: 20.2 K/UL (ref 4.8–10.8)
WBC # BLD AUTO: 6.8 K/UL (ref 4.8–10.8)
WBC # BLD AUTO: 9 K/UL (ref 4.8–10.8)
WBC # BLD AUTO: 9.5 K/UL (ref 4.8–10.8)
WBC # BLD AUTO: 9.7 K/UL (ref 4.8–10.8)

## 2017-01-01 PROCEDURE — 93010 ELECTROCARDIOGRAM REPORT: CPT | Performed by: INTERNAL MEDICINE

## 2017-01-01 PROCEDURE — 99239 HOSP IP/OBS DSCHRG MGMT >30: CPT | Performed by: HOSPITALIST

## 2017-01-01 PROCEDURE — 94760 N-INVAS EAR/PLS OXIMETRY 1: CPT

## 2017-01-01 PROCEDURE — 93005 ELECTROCARDIOGRAM TRACING: CPT | Performed by: EMERGENCY MEDICINE

## 2017-01-01 PROCEDURE — 85025 COMPLETE CBC W/AUTO DIFF WBC: CPT

## 2017-01-01 PROCEDURE — 99214 OFFICE O/P EST MOD 30 MIN: CPT | Performed by: NURSE PRACTITIONER

## 2017-01-01 PROCEDURE — 84443 ASSAY THYROID STIM HORMONE: CPT

## 2017-01-01 PROCEDURE — 700111 HCHG RX REV CODE 636 W/ 250 OVERRIDE (IP): Performed by: HOSPITALIST

## 2017-01-01 PROCEDURE — 83735 ASSAY OF MAGNESIUM: CPT

## 2017-01-01 PROCEDURE — 97161 PT EVAL LOW COMPLEX 20 MIN: CPT

## 2017-01-01 PROCEDURE — 83036 HEMOGLOBIN GLYCOSYLATED A1C: CPT

## 2017-01-01 PROCEDURE — 700102 HCHG RX REV CODE 250 W/ 637 OVERRIDE(OP): Performed by: EMERGENCY MEDICINE

## 2017-01-01 PROCEDURE — 93005 ELECTROCARDIOGRAM TRACING: CPT | Performed by: INTERNAL MEDICINE

## 2017-01-01 PROCEDURE — 304562 HCHG STAT O2 MASK/CANNULA

## 2017-01-01 PROCEDURE — 94640 AIRWAY INHALATION TREATMENT: CPT

## 2017-01-01 PROCEDURE — 84484 ASSAY OF TROPONIN QUANT: CPT

## 2017-01-01 PROCEDURE — 700101 HCHG RX REV CODE 250: Performed by: INTERNAL MEDICINE

## 2017-01-01 PROCEDURE — G8432 DEP SCR NOT DOC, RNG: HCPCS | Performed by: NURSE PRACTITIONER

## 2017-01-01 PROCEDURE — 700102 HCHG RX REV CODE 250 W/ 637 OVERRIDE(OP): Performed by: INTERNAL MEDICINE

## 2017-01-01 PROCEDURE — 700111 HCHG RX REV CODE 636 W/ 250 OVERRIDE (IP): Performed by: INTERNAL MEDICINE

## 2017-01-01 PROCEDURE — 36415 COLL VENOUS BLD VENIPUNCTURE: CPT

## 2017-01-01 PROCEDURE — A9270 NON-COVERED ITEM OR SERVICE: HCPCS | Performed by: HOSPITALIST

## 2017-01-01 PROCEDURE — 99232 SBSQ HOSP IP/OBS MODERATE 35: CPT | Performed by: INTERNAL MEDICINE

## 2017-01-01 PROCEDURE — G0378 HOSPITAL OBSERVATION PER HR: HCPCS

## 2017-01-01 PROCEDURE — 82962 GLUCOSE BLOOD TEST: CPT

## 2017-01-01 PROCEDURE — 770020 HCHG ROOM/CARE - TELE (206)

## 2017-01-01 PROCEDURE — 700105 HCHG RX REV CODE 258: Performed by: INTERNAL MEDICINE

## 2017-01-01 PROCEDURE — 700102 HCHG RX REV CODE 250 W/ 637 OVERRIDE(OP): Performed by: HOSPITALIST

## 2017-01-01 PROCEDURE — A9270 NON-COVERED ITEM OR SERVICE: HCPCS | Performed by: INTERNAL MEDICINE

## 2017-01-01 PROCEDURE — 5A0955Z ASSISTANCE WITH RESPIRATORY VENTILATION, GREATER THAN 96 CONSECUTIVE HOURS: ICD-10-PCS | Performed by: EMERGENCY MEDICINE

## 2017-01-01 PROCEDURE — 700105 HCHG RX REV CODE 258: Performed by: EMERGENCY MEDICINE

## 2017-01-01 PROCEDURE — 304561 HCHG STAT O2

## 2017-01-01 PROCEDURE — 700101 HCHG RX REV CODE 250: Performed by: HOSPITALIST

## 2017-01-01 PROCEDURE — 80048 BASIC METABOLIC PNL TOTAL CA: CPT

## 2017-01-01 PROCEDURE — 80053 COMPREHEN METABOLIC PANEL: CPT

## 2017-01-01 PROCEDURE — G8482 FLU IMMUNIZE ORDER/ADMIN: HCPCS | Performed by: NURSE PRACTITIONER

## 2017-01-01 PROCEDURE — 90662 IIV NO PRSV INCREASED AG IM: CPT | Performed by: HOSPITALIST

## 2017-01-01 PROCEDURE — 99285 EMERGENCY DEPT VISIT HI MDM: CPT

## 2017-01-01 PROCEDURE — 99232 SBSQ HOSP IP/OBS MODERATE 35: CPT | Performed by: HOSPITALIST

## 2017-01-01 PROCEDURE — 71010 DX-CHEST-PORTABLE (1 VIEW): CPT

## 2017-01-01 PROCEDURE — 96360 HYDRATION IV INFUSION INIT: CPT

## 2017-01-01 PROCEDURE — A9270 NON-COVERED ITEM OR SERVICE: HCPCS | Performed by: EMERGENCY MEDICINE

## 2017-01-01 PROCEDURE — 71020 DX-CHEST-2 VIEWS: CPT

## 2017-01-01 PROCEDURE — G8988 SELF CARE GOAL STATUS: HCPCS | Mod: CI

## 2017-01-01 PROCEDURE — 83605 ASSAY OF LACTIC ACID: CPT | Mod: 91

## 2017-01-01 PROCEDURE — 700111 HCHG RX REV CODE 636 W/ 250 OVERRIDE (IP): Performed by: EMERGENCY MEDICINE

## 2017-01-01 PROCEDURE — 99233 SBSQ HOSP IP/OBS HIGH 50: CPT | Performed by: HOSPITALIST

## 2017-01-01 PROCEDURE — 96374 THER/PROPH/DIAG INJ IV PUSH: CPT

## 2017-01-01 PROCEDURE — G8978 MOBILITY CURRENT STATUS: HCPCS | Mod: CK

## 2017-01-01 PROCEDURE — 96365 THER/PROPH/DIAG IV INF INIT: CPT

## 2017-01-01 PROCEDURE — 99213 OFFICE O/P EST LOW 20 MIN: CPT | Performed by: INTERNAL MEDICINE

## 2017-01-01 PROCEDURE — 99214 OFFICE O/P EST MOD 30 MIN: CPT | Performed by: FAMILY MEDICINE

## 2017-01-01 PROCEDURE — 1101F PT FALLS ASSESS-DOCD LE1/YR: CPT | Performed by: NURSE PRACTITIONER

## 2017-01-01 PROCEDURE — G8979 MOBILITY GOAL STATUS: HCPCS | Mod: CI

## 2017-01-01 PROCEDURE — 96368 THER/DIAG CONCURRENT INF: CPT

## 2017-01-01 PROCEDURE — 302129 PCA PLUS: Performed by: INTERNAL MEDICINE

## 2017-01-01 PROCEDURE — 87086 URINE CULTURE/COLONY COUNT: CPT

## 2017-01-01 PROCEDURE — 93005 ELECTROCARDIOGRAM TRACING: CPT

## 2017-01-01 PROCEDURE — 80061 LIPID PANEL: CPT

## 2017-01-01 PROCEDURE — 99223 1ST HOSP IP/OBS HIGH 75: CPT | Mod: AI | Performed by: INTERNAL MEDICINE

## 2017-01-01 PROCEDURE — G8419 CALC BMI OUT NRM PARAM NOF/U: HCPCS | Performed by: FAMILY MEDICINE

## 2017-01-01 PROCEDURE — G8419 CALC BMI OUT NRM PARAM NOF/U: HCPCS | Performed by: INTERNAL MEDICINE

## 2017-01-01 PROCEDURE — 700111 HCHG RX REV CODE 636 W/ 250 OVERRIDE (IP)

## 2017-01-01 PROCEDURE — 97165 OT EVAL LOW COMPLEX 30 MIN: CPT

## 2017-01-01 PROCEDURE — 94761 N-INVAS EAR/PLS OXIMETRY MLT: CPT | Performed by: NURSE PRACTITIONER

## 2017-01-01 PROCEDURE — 82962 GLUCOSE BLOOD TEST: CPT | Mod: 91

## 2017-01-01 PROCEDURE — 82306 VITAMIN D 25 HYDROXY: CPT

## 2017-01-01 PROCEDURE — 3E0234Z INTRODUCTION OF SERUM, TOXOID AND VACCINE INTO MUSCLE, PERCUTANEOUS APPROACH: ICD-10-PCS | Performed by: HOSPITALIST

## 2017-01-01 PROCEDURE — 96375 TX/PRO/DX INJ NEW DRUG ADDON: CPT

## 2017-01-01 PROCEDURE — G8979 MOBILITY GOAL STATUS: HCPCS | Mod: CK

## 2017-01-01 PROCEDURE — 99407 BEHAV CHNG SMOKING > 10 MIN: CPT | Performed by: INTERNAL MEDICINE

## 2017-01-01 PROCEDURE — 87400 INFLUENZA A/B EACH AG IA: CPT

## 2017-01-01 PROCEDURE — 81003 URINALYSIS AUTO W/O SCOPE: CPT

## 2017-01-01 PROCEDURE — 99223 1ST HOSP IP/OBS HIGH 75: CPT | Mod: 25 | Performed by: INTERNAL MEDICINE

## 2017-01-01 PROCEDURE — G8510 SCR DEP NEG, NO PLAN REQD: HCPCS | Performed by: FAMILY MEDICINE

## 2017-01-01 PROCEDURE — 700105 HCHG RX REV CODE 258

## 2017-01-01 PROCEDURE — 99217 PR OBSERVATION CARE DISCHARGE: CPT | Performed by: HOSPITALIST

## 2017-01-01 PROCEDURE — G8482 FLU IMMUNIZE ORDER/ADMIN: HCPCS | Performed by: FAMILY MEDICINE

## 2017-01-01 PROCEDURE — 700101 HCHG RX REV CODE 250: Performed by: EMERGENCY MEDICINE

## 2017-01-01 PROCEDURE — 99406 BEHAV CHNG SMOKING 3-10 MIN: CPT

## 2017-01-01 PROCEDURE — 71250 CT THORAX DX C-: CPT

## 2017-01-01 PROCEDURE — 83605 ASSAY OF LACTIC ACID: CPT

## 2017-01-01 PROCEDURE — 306588 SLEEVE,VASO CALF MED: Performed by: INTERNAL MEDICINE

## 2017-01-01 PROCEDURE — 4040F PNEUMOC VAC/ADMIN/RCVD: CPT | Performed by: FAMILY MEDICINE

## 2017-01-01 PROCEDURE — 4004F PT TOBACCO SCREEN RCVD TLK: CPT | Performed by: NURSE PRACTITIONER

## 2017-01-01 PROCEDURE — 84145 PROCALCITONIN (PCT): CPT

## 2017-01-01 PROCEDURE — 99220 PR INITIAL OBSERVATION CARE,LEVL III: CPT | Mod: 25 | Performed by: INTERNAL MEDICINE

## 2017-01-01 PROCEDURE — 302128 INFUSION PUMP: Performed by: EMERGENCY MEDICINE

## 2017-01-01 PROCEDURE — 4004F PT TOBACCO SCREEN RCVD TLK: CPT | Performed by: FAMILY MEDICINE

## 2017-01-01 PROCEDURE — 1101F PT FALLS ASSESS-DOCD LE1/YR: CPT | Performed by: FAMILY MEDICINE

## 2017-01-01 PROCEDURE — 96367 TX/PROPH/DG ADDL SEQ IV INF: CPT

## 2017-01-01 PROCEDURE — 51701 INSERT BLADDER CATHETER: CPT

## 2017-01-01 PROCEDURE — 82803 BLOOD GASES ANY COMBINATION: CPT

## 2017-01-01 PROCEDURE — 93306 TTE W/DOPPLER COMPLETE: CPT | Mod: 26 | Performed by: INTERNAL MEDICINE

## 2017-01-01 PROCEDURE — G8987 SELF CARE CURRENT STATUS: HCPCS | Mod: CJ

## 2017-01-01 PROCEDURE — 83880 ASSAY OF NATRIURETIC PEPTIDE: CPT

## 2017-01-01 PROCEDURE — G8432 DEP SCR NOT DOC, RNG: HCPCS | Performed by: FAMILY MEDICINE

## 2017-01-01 PROCEDURE — 90715 TDAP VACCINE 7 YRS/> IM: CPT | Performed by: FAMILY MEDICINE

## 2017-01-01 PROCEDURE — 85379 FIBRIN DEGRADATION QUANT: CPT

## 2017-01-01 PROCEDURE — 96372 THER/PROPH/DIAG INJ SC/IM: CPT

## 2017-01-01 PROCEDURE — 99233 SBSQ HOSP IP/OBS HIGH 50: CPT | Performed by: INTERNAL MEDICINE

## 2017-01-01 PROCEDURE — 71010 DX-CHEST-LIMITED (1 VIEW): CPT

## 2017-01-01 PROCEDURE — 770006 HCHG ROOM/CARE - MED/SURG/GYN SEMI*

## 2017-01-01 PROCEDURE — 90471 IMMUNIZATION ADMIN: CPT

## 2017-01-01 PROCEDURE — 99223 1ST HOSP IP/OBS HIGH 75: CPT | Mod: 25 | Performed by: HOSPITALIST

## 2017-01-01 PROCEDURE — 83550 IRON BINDING TEST: CPT

## 2017-01-01 PROCEDURE — 90471 IMMUNIZATION ADMIN: CPT | Performed by: FAMILY MEDICINE

## 2017-01-01 PROCEDURE — 93306 TTE W/DOPPLER COMPLETE: CPT

## 2017-01-01 PROCEDURE — 85610 PROTHROMBIN TIME: CPT

## 2017-01-01 PROCEDURE — G8419 CALC BMI OUT NRM PARAM NOF/U: HCPCS | Performed by: NURSE PRACTITIONER

## 2017-01-01 PROCEDURE — 87040 BLOOD CULTURE FOR BACTERIA: CPT | Mod: 91

## 2017-01-01 PROCEDURE — G8978 MOBILITY CURRENT STATUS: HCPCS | Mod: CJ

## 2017-01-01 PROCEDURE — 1101F PT FALLS ASSESS-DOCD LE1/YR: CPT | Performed by: INTERNAL MEDICINE

## 2017-01-01 PROCEDURE — 4040F PNEUMOC VAC/ADMIN/RCVD: CPT | Performed by: NURSE PRACTITIONER

## 2017-01-01 PROCEDURE — 700111 HCHG RX REV CODE 636 W/ 250 OVERRIDE (IP): Performed by: FAMILY MEDICINE

## 2017-01-01 PROCEDURE — 96361 HYDRATE IV INFUSION ADD-ON: CPT

## 2017-01-01 PROCEDURE — G0439 PPPS, SUBSEQ VISIT: HCPCS | Mod: 25 | Performed by: FAMILY MEDICINE

## 2017-01-01 PROCEDURE — 99406 BEHAV CHNG SMOKING 3-10 MIN: CPT | Performed by: HOSPITALIST

## 2017-01-01 PROCEDURE — 99497 ADVNCD CARE PLAN 30 MIN: CPT | Performed by: INTERNAL MEDICINE

## 2017-01-01 PROCEDURE — 93005 ELECTROCARDIOGRAM TRACING: CPT | Performed by: HOSPITALIST

## 2017-01-01 PROCEDURE — G8980 MOBILITY D/C STATUS: HCPCS | Mod: CK

## 2017-01-01 PROCEDURE — 83540 ASSAY OF IRON: CPT

## 2017-01-01 PROCEDURE — 99204 OFFICE O/P NEW MOD 45 MIN: CPT | Mod: 25 | Performed by: INTERNAL MEDICINE

## 2017-01-01 PROCEDURE — 85730 THROMBOPLASTIN TIME PARTIAL: CPT

## 2017-01-01 RX ORDER — CLONAZEPAM 0.5 MG/1
0.25 TABLET ORAL EVERY 12 HOURS PRN
Status: DISCONTINUED | OUTPATIENT
Start: 2017-01-01 | End: 2017-01-01

## 2017-01-01 RX ORDER — NICOTINE 21 MG/24HR
14 PATCH, TRANSDERMAL 24 HOURS TRANSDERMAL
Status: DISCONTINUED | OUTPATIENT
Start: 2017-01-01 | End: 2017-01-01 | Stop reason: HOSPADM

## 2017-01-01 RX ORDER — ACETAMINOPHEN 325 MG/1
650 TABLET ORAL EVERY 6 HOURS PRN
Status: DISCONTINUED | OUTPATIENT
Start: 2017-01-01 | End: 2017-01-01 | Stop reason: HOSPADM

## 2017-01-01 RX ORDER — ONDANSETRON 4 MG/1
4 TABLET, ORALLY DISINTEGRATING ORAL EVERY 4 HOURS PRN
Status: DISCONTINUED | OUTPATIENT
Start: 2017-01-01 | End: 2017-01-01

## 2017-01-01 RX ORDER — CLONAZEPAM 0.5 MG/1
TABLET ORAL
Qty: 60 TAB | Refills: 0 | Status: SHIPPED
Start: 2017-01-01 | End: 2017-01-01 | Stop reason: SDUPTHER

## 2017-01-01 RX ORDER — BUSPIRONE HYDROCHLORIDE 10 MG/1
5 TABLET ORAL DAILY
Status: DISCONTINUED | OUTPATIENT
Start: 2017-01-01 | End: 2017-01-01 | Stop reason: HOSPADM

## 2017-01-01 RX ORDER — POTASSIUM CHLORIDE 20 MEQ/1
40 TABLET, EXTENDED RELEASE ORAL ONCE
Status: COMPLETED | OUTPATIENT
Start: 2017-01-01 | End: 2017-01-01

## 2017-01-01 RX ORDER — DIPHENHYDRAMINE HYDROCHLORIDE 50 MG/ML
12.5 INJECTION INTRAMUSCULAR; INTRAVENOUS ONCE
Status: COMPLETED | OUTPATIENT
Start: 2017-01-01 | End: 2017-01-01

## 2017-01-01 RX ORDER — HALOPERIDOL 5 MG/ML
2.5 INJECTION INTRAMUSCULAR ONCE
Status: COMPLETED | OUTPATIENT
Start: 2017-01-01 | End: 2017-01-01

## 2017-01-01 RX ORDER — METOPROLOL SUCCINATE 50 MG/1
50 TABLET, EXTENDED RELEASE ORAL
Status: DISCONTINUED | OUTPATIENT
Start: 2017-01-01 | End: 2017-01-01

## 2017-01-01 RX ORDER — HYDRALAZINE HYDROCHLORIDE 20 MG/ML
10 INJECTION INTRAMUSCULAR; INTRAVENOUS EVERY 4 HOURS PRN
Status: DISCONTINUED | OUTPATIENT
Start: 2017-01-01 | End: 2017-01-01 | Stop reason: HOSPADM

## 2017-01-01 RX ORDER — CEFDINIR 300 MG/1
300 CAPSULE ORAL EVERY 12 HOURS
Qty: 6 CAP | Refills: 0 | Status: SHIPPED | OUTPATIENT
Start: 2017-01-01 | End: 2017-01-01

## 2017-01-01 RX ORDER — NICOTINE 21 MG/24HR
21 PATCH, TRANSDERMAL 24 HOURS TRANSDERMAL
Status: DISCONTINUED | OUTPATIENT
Start: 2017-01-01 | End: 2017-01-01 | Stop reason: HOSPADM

## 2017-01-01 RX ORDER — SIMVASTATIN 40 MG
20 TABLET ORAL EVERY EVENING
Status: DISCONTINUED | OUTPATIENT
Start: 2017-01-01 | End: 2017-01-01

## 2017-01-01 RX ORDER — PREDNISONE 20 MG/1
40 TABLET ORAL DAILY
Status: DISCONTINUED | OUTPATIENT
Start: 2017-01-01 | End: 2017-01-01 | Stop reason: HOSPADM

## 2017-01-01 RX ORDER — BUDESONIDE AND FORMOTEROL FUMARATE DIHYDRATE 160; 4.5 UG/1; UG/1
2 AEROSOL RESPIRATORY (INHALATION)
Status: DISCONTINUED | OUTPATIENT
Start: 2017-01-01 | End: 2017-01-01 | Stop reason: HOSPADM

## 2017-01-01 RX ORDER — GUAIFENESIN/DEXTROMETHORPHAN 100-10MG/5
10 SYRUP ORAL EVERY 6 HOURS PRN
Status: DISCONTINUED | OUTPATIENT
Start: 2017-01-01 | End: 2017-01-01 | Stop reason: HOSPADM

## 2017-01-01 RX ORDER — MAGNESIUM SULFATE HEPTAHYDRATE 40 MG/ML
2 INJECTION, SOLUTION INTRAVENOUS ONCE
Status: COMPLETED | OUTPATIENT
Start: 2017-01-01 | End: 2017-01-01

## 2017-01-01 RX ORDER — AMLODIPINE BESYLATE 5 MG/1
10 TABLET ORAL
Status: DISCONTINUED | OUTPATIENT
Start: 2017-01-01 | End: 2017-01-01

## 2017-01-01 RX ORDER — SODIUM CHLORIDE 9 MG/ML
INJECTION, SOLUTION INTRAVENOUS CONTINUOUS
Status: DISCONTINUED | OUTPATIENT
Start: 2017-01-01 | End: 2017-01-01

## 2017-01-01 RX ORDER — IPRATROPIUM BROMIDE AND ALBUTEROL SULFATE 2.5; .5 MG/3ML; MG/3ML
3 SOLUTION RESPIRATORY (INHALATION)
Status: DISCONTINUED | OUTPATIENT
Start: 2017-01-01 | End: 2017-01-01 | Stop reason: HOSPADM

## 2017-01-01 RX ORDER — METHYLPREDNISOLONE SODIUM SUCCINATE 40 MG/ML
40 INJECTION, POWDER, LYOPHILIZED, FOR SOLUTION INTRAMUSCULAR; INTRAVENOUS EVERY 6 HOURS
Status: DISCONTINUED | OUTPATIENT
Start: 2017-01-01 | End: 2017-01-01

## 2017-01-01 RX ORDER — IPRATROPIUM BROMIDE AND ALBUTEROL SULFATE 2.5; .5 MG/3ML; MG/3ML
SOLUTION RESPIRATORY (INHALATION)
Qty: 90 ML | Refills: 5 | Status: SHIPPED | OUTPATIENT
Start: 2017-01-01

## 2017-01-01 RX ORDER — CLONAZEPAM 0.5 MG/1
0.25 TABLET ORAL 2 TIMES DAILY PRN
COMMUNITY

## 2017-01-01 RX ORDER — BISACODYL 10 MG
10 SUPPOSITORY, RECTAL RECTAL
Status: DISCONTINUED | OUTPATIENT
Start: 2017-01-01 | End: 2017-01-01 | Stop reason: HOSPADM

## 2017-01-01 RX ORDER — AMOXICILLIN 250 MG
2 CAPSULE ORAL 2 TIMES DAILY
Status: DISCONTINUED | OUTPATIENT
Start: 2017-01-01 | End: 2017-01-01

## 2017-01-01 RX ORDER — POTASSIUM CHLORIDE 20 MEQ/1
40 TABLET, EXTENDED RELEASE ORAL DAILY
Status: DISCONTINUED | OUTPATIENT
Start: 2017-01-01 | End: 2017-01-01 | Stop reason: HOSPADM

## 2017-01-01 RX ORDER — BUSPIRONE HYDROCHLORIDE 5 MG/1
5 TABLET ORAL DAILY
Qty: 30 TAB | Refills: 5 | Status: SHIPPED | OUTPATIENT
Start: 2017-01-01

## 2017-01-01 RX ORDER — TIOTROPIUM BROMIDE 18 UG/1
1 CAPSULE ORAL; RESPIRATORY (INHALATION)
Status: DISCONTINUED | OUTPATIENT
Start: 2017-01-01 | End: 2017-01-01 | Stop reason: HOSPADM

## 2017-01-01 RX ORDER — POLYETHYLENE GLYCOL 3350 17 G/17G
1 POWDER, FOR SOLUTION ORAL
Status: DISCONTINUED | OUTPATIENT
Start: 2017-01-01 | End: 2017-01-01 | Stop reason: HOSPADM

## 2017-01-01 RX ORDER — IPRATROPIUM BROMIDE AND ALBUTEROL SULFATE 2.5; .5 MG/3ML; MG/3ML
3 SOLUTION RESPIRATORY (INHALATION)
Status: DISCONTINUED | OUTPATIENT
Start: 2017-01-01 | End: 2017-01-01

## 2017-01-01 RX ORDER — DOXYCYCLINE 100 MG/1
100 TABLET ORAL EVERY 12 HOURS
Status: DISCONTINUED | OUTPATIENT
Start: 2017-01-01 | End: 2017-01-01 | Stop reason: HOSPADM

## 2017-01-01 RX ORDER — AMOXICILLIN 250 MG
2 CAPSULE ORAL 2 TIMES DAILY
Status: DISCONTINUED | OUTPATIENT
Start: 2017-01-01 | End: 2017-01-01 | Stop reason: HOSPADM

## 2017-01-01 RX ORDER — SODIUM CHLORIDE 9 MG/ML
INJECTION, SOLUTION INTRAVENOUS
Status: COMPLETED
Start: 2017-01-01 | End: 2017-01-01

## 2017-01-01 RX ORDER — METOPROLOL SUCCINATE 50 MG/1
50 TABLET, EXTENDED RELEASE ORAL
Status: DISCONTINUED | OUTPATIENT
Start: 2017-01-01 | End: 2017-01-01 | Stop reason: HOSPADM

## 2017-01-01 RX ORDER — DOXYCYCLINE 100 MG/1
100 TABLET ORAL ONCE
Status: DISCONTINUED | OUTPATIENT
Start: 2017-01-01 | End: 2017-01-01

## 2017-01-01 RX ORDER — POTASSIUM CHLORIDE 7.45 MG/ML
10 INJECTION INTRAVENOUS ONCE
Status: COMPLETED | OUTPATIENT
Start: 2017-01-01 | End: 2017-01-01

## 2017-01-01 RX ORDER — SIMVASTATIN 20 MG
20 TABLET ORAL NIGHTLY
Status: DISCONTINUED | OUTPATIENT
Start: 2017-01-01 | End: 2017-01-01 | Stop reason: HOSPADM

## 2017-01-01 RX ORDER — CLONAZEPAM 0.5 MG/1
0.25 TABLET ORAL EVERY 12 HOURS PRN
Qty: 30 TAB | Refills: 0 | Status: SHIPPED | OUTPATIENT
Start: 2017-01-01 | End: 2017-01-01

## 2017-01-01 RX ORDER — ONDANSETRON 4 MG/1
4 TABLET, ORALLY DISINTEGRATING ORAL EVERY 4 HOURS PRN
Status: DISCONTINUED | OUTPATIENT
Start: 2017-01-01 | End: 2017-01-01 | Stop reason: HOSPADM

## 2017-01-01 RX ORDER — IPRATROPIUM BROMIDE AND ALBUTEROL SULFATE 2.5; .5 MG/3ML; MG/3ML
3 SOLUTION RESPIRATORY (INHALATION)
Status: COMPLETED | OUTPATIENT
Start: 2017-01-01 | End: 2017-01-01

## 2017-01-01 RX ORDER — BUDESONIDE AND FORMOTEROL FUMARATE DIHYDRATE 160; 4.5 UG/1; UG/1
2 AEROSOL RESPIRATORY (INHALATION) 2 TIMES DAILY
Status: DISCONTINUED | OUTPATIENT
Start: 2017-01-01 | End: 2017-01-01 | Stop reason: HOSPADM

## 2017-01-01 RX ORDER — TIOTROPIUM BROMIDE 18 UG/1
18 CAPSULE ORAL; RESPIRATORY (INHALATION) DAILY
Qty: 30 CAP | Refills: 0 | Status: SHIPPED | OUTPATIENT
Start: 2017-01-01 | End: 2017-01-01

## 2017-01-01 RX ORDER — CEFDINIR 300 MG/1
300 CAPSULE ORAL EVERY 12 HOURS
Status: DISCONTINUED | OUTPATIENT
Start: 2017-01-01 | End: 2017-01-01 | Stop reason: HOSPADM

## 2017-01-01 RX ORDER — CLONAZEPAM 0.5 MG/1
0.25 TABLET ORAL
Status: ON HOLD | COMMUNITY
End: 2017-01-01

## 2017-01-01 RX ORDER — TIOTROPIUM BROMIDE 18 UG/1
1 CAPSULE ORAL; RESPIRATORY (INHALATION) DAILY
Status: DISCONTINUED | OUTPATIENT
Start: 2017-01-01 | End: 2017-01-01 | Stop reason: HOSPADM

## 2017-01-01 RX ORDER — CEFTRIAXONE 2 G/1
2 INJECTION, POWDER, FOR SOLUTION INTRAMUSCULAR; INTRAVENOUS ONCE
Status: DISCONTINUED | OUTPATIENT
Start: 2017-01-01 | End: 2017-01-01

## 2017-01-01 RX ORDER — METHYLPREDNISOLONE SODIUM SUCCINATE 125 MG/2ML
62.5 INJECTION, POWDER, LYOPHILIZED, FOR SOLUTION INTRAMUSCULAR; INTRAVENOUS EVERY 12 HOURS
Status: DISCONTINUED | OUTPATIENT
Start: 2017-01-01 | End: 2017-01-01 | Stop reason: HOSPADM

## 2017-01-01 RX ORDER — ALPRAZOLAM 0.25 MG/1
0.25 TABLET ORAL 3 TIMES DAILY PRN
Status: DISCONTINUED | OUTPATIENT
Start: 2017-01-01 | End: 2017-01-01 | Stop reason: HOSPADM

## 2017-01-01 RX ORDER — NICOTINE 21 MG/24HR
1 PATCH, TRANSDERMAL 24 HOURS TRANSDERMAL EVERY 24 HOURS
Qty: 30 PATCH | Refills: 1 | Status: SHIPPED | OUTPATIENT
Start: 2017-01-01 | End: 2017-01-01

## 2017-01-01 RX ORDER — LABETALOL HYDROCHLORIDE 5 MG/ML
10 INJECTION, SOLUTION INTRAVENOUS EVERY 4 HOURS PRN
Status: DISCONTINUED | OUTPATIENT
Start: 2017-01-01 | End: 2017-01-01 | Stop reason: HOSPADM

## 2017-01-01 RX ORDER — AMLODIPINE BESYLATE 5 MG/1
5 TABLET ORAL 2 TIMES DAILY
COMMUNITY

## 2017-01-01 RX ORDER — CLONAZEPAM 0.5 MG/1
0.25 TABLET ORAL
Status: DISCONTINUED | OUTPATIENT
Start: 2017-01-01 | End: 2017-01-01 | Stop reason: HOSPADM

## 2017-01-01 RX ORDER — PREDNISONE 10 MG/1
10 TABLET ORAL DAILY
Qty: 32 QUANTITY SUFFICIENT | Refills: 0 | Status: SHIPPED | OUTPATIENT
Start: 2017-01-01 | End: 2017-01-01

## 2017-01-01 RX ORDER — AMLODIPINE BESYLATE 5 MG/1
10 TABLET ORAL DAILY
Qty: 30 TAB | Refills: 0 | Status: SHIPPED | OUTPATIENT
Start: 2017-01-01 | End: 2017-01-01 | Stop reason: SDUPTHER

## 2017-01-01 RX ORDER — ONDANSETRON 2 MG/ML
4 INJECTION INTRAMUSCULAR; INTRAVENOUS EVERY 4 HOURS PRN
Status: DISCONTINUED | OUTPATIENT
Start: 2017-01-01 | End: 2017-01-01 | Stop reason: HOSPADM

## 2017-01-01 RX ORDER — CLONAZEPAM 0.5 MG/1
0.25 TABLET ORAL
Status: DISCONTINUED | OUTPATIENT
Start: 2017-01-01 | End: 2017-01-01

## 2017-01-01 RX ORDER — POLYETHYLENE GLYCOL 3350 17 G/17G
1 POWDER, FOR SOLUTION ORAL
Status: DISCONTINUED | OUTPATIENT
Start: 2017-01-01 | End: 2017-01-01

## 2017-01-01 RX ORDER — SODIUM CHLORIDE 9 MG/ML
1000 INJECTION, SOLUTION INTRAVENOUS ONCE
Status: COMPLETED | OUTPATIENT
Start: 2017-01-01 | End: 2017-01-01

## 2017-01-01 RX ORDER — CLONAZEPAM 1 MG/1
TABLET ORAL
Qty: 60 TAB | Refills: 0 | OUTPATIENT
Start: 2017-01-01

## 2017-01-01 RX ORDER — BISACODYL 10 MG
10 SUPPOSITORY, RECTAL RECTAL
Status: DISCONTINUED | OUTPATIENT
Start: 2017-01-01 | End: 2017-01-01

## 2017-01-01 RX ORDER — AMPICILLIN AND SULBACTAM 2; 1 G/1; G/1
3 INJECTION, POWDER, FOR SOLUTION INTRAMUSCULAR; INTRAVENOUS ONCE
Status: COMPLETED | OUTPATIENT
Start: 2017-01-01 | End: 2017-01-01

## 2017-01-01 RX ORDER — SIMVASTATIN 20 MG
20 TABLET ORAL EVERY EVENING
Status: DISCONTINUED | OUTPATIENT
Start: 2017-01-01 | End: 2017-01-01 | Stop reason: HOSPADM

## 2017-01-01 RX ORDER — CLONAZEPAM 0.5 MG/1
0.5 TABLET ORAL 2 TIMES DAILY PRN
Status: DISCONTINUED | OUTPATIENT
Start: 2017-01-01 | End: 2017-01-01 | Stop reason: HOSPADM

## 2017-01-01 RX ORDER — AZITHROMYCIN 250 MG/1
500 TABLET, FILM COATED ORAL ONCE
Status: DISCONTINUED | OUTPATIENT
Start: 2017-01-01 | End: 2017-01-01

## 2017-01-01 RX ORDER — ALBUTEROL SULFATE 90 UG/1
2 AEROSOL, METERED RESPIRATORY (INHALATION)
Status: DISCONTINUED | OUTPATIENT
Start: 2017-01-01 | End: 2017-01-01

## 2017-01-01 RX ORDER — FAMOTIDINE 20 MG/1
20 TABLET, FILM COATED ORAL 2 TIMES DAILY
Status: DISCONTINUED | OUTPATIENT
Start: 2017-01-01 | End: 2017-01-01 | Stop reason: HOSPADM

## 2017-01-01 RX ORDER — BUSPIRONE HYDROCHLORIDE 5 MG/1
5 TABLET ORAL DAILY
Status: DISCONTINUED | OUTPATIENT
Start: 2017-01-01 | End: 2017-01-01

## 2017-01-01 RX ORDER — ALBUTEROL SULFATE 90 UG/1
2 AEROSOL, METERED RESPIRATORY (INHALATION) EVERY 4 HOURS PRN
Status: DISCONTINUED | OUTPATIENT
Start: 2017-01-01 | End: 2017-01-01 | Stop reason: HOSPADM

## 2017-01-01 RX ORDER — AMLODIPINE BESYLATE 5 MG/1
5 TABLET ORAL ONCE
Status: COMPLETED | OUTPATIENT
Start: 2017-01-01 | End: 2017-01-01

## 2017-01-01 RX ORDER — CLONAZEPAM 0.5 MG/1
0.25 TABLET ORAL 2 TIMES DAILY
Qty: 60 TAB | Refills: 0 | Status: SHIPPED
Start: 2017-01-01 | End: 2017-01-01

## 2017-01-01 RX ORDER — CLONAZEPAM 1 MG/1
1 TABLET ORAL
Qty: 60 TAB | Refills: 0
Start: 2017-01-01

## 2017-01-01 RX ORDER — BUDESONIDE AND FORMOTEROL FUMARATE DIHYDRATE 160; 4.5 UG/1; UG/1
2 AEROSOL RESPIRATORY (INHALATION) 2 TIMES DAILY
COMMUNITY
End: 2017-01-01

## 2017-01-01 RX ORDER — CLONAZEPAM 0.5 MG/1
0.5 TABLET ORAL
Status: DISCONTINUED | OUTPATIENT
Start: 2017-01-01 | End: 2017-01-01 | Stop reason: HOSPADM

## 2017-01-01 RX ORDER — LORAZEPAM 2 MG/ML
1 INJECTION INTRAMUSCULAR ONCE
Status: COMPLETED | OUTPATIENT
Start: 2017-01-01 | End: 2017-01-01

## 2017-01-01 RX ORDER — ALBUTEROL SULFATE 90 UG/1
2 AEROSOL, METERED RESPIRATORY (INHALATION) EVERY 6 HOURS PRN
COMMUNITY
End: 2017-01-01 | Stop reason: SDUPTHER

## 2017-01-01 RX ORDER — SODIUM CHLORIDE 9 MG/ML
1000 INJECTION, SOLUTION INTRAVENOUS
Status: DISCONTINUED | OUTPATIENT
Start: 2017-01-01 | End: 2017-01-01

## 2017-01-01 RX ORDER — DEXAMETHASONE SODIUM PHOSPHATE 4 MG/ML
6 INJECTION, SOLUTION INTRA-ARTICULAR; INTRALESIONAL; INTRAMUSCULAR; INTRAVENOUS; SOFT TISSUE ONCE
Status: COMPLETED | OUTPATIENT
Start: 2017-01-01 | End: 2017-01-01

## 2017-01-01 RX ORDER — BUDESONIDE AND FORMOTEROL FUMARATE DIHYDRATE 160; 4.5 UG/1; UG/1
2 AEROSOL RESPIRATORY (INHALATION)
Status: DISCONTINUED | OUTPATIENT
Start: 2017-01-01 | End: 2017-01-01

## 2017-01-01 RX ORDER — CLONAZEPAM 1 MG/1
TABLET ORAL
Qty: 60 TAB | Refills: 0 | Status: SHIPPED
Start: 2017-01-01 | End: 2017-01-01 | Stop reason: SDUPTHER

## 2017-01-01 RX ORDER — POTASSIUM CHLORIDE 20 MEQ/1
20 TABLET, EXTENDED RELEASE ORAL ONCE
Status: COMPLETED | OUTPATIENT
Start: 2017-01-01 | End: 2017-01-01

## 2017-01-01 RX ORDER — HEPARIN SODIUM 5000 [USP'U]/ML
5000 INJECTION, SOLUTION INTRAVENOUS; SUBCUTANEOUS EVERY 8 HOURS
Status: DISCONTINUED | OUTPATIENT
Start: 2017-01-01 | End: 2017-01-01 | Stop reason: HOSPADM

## 2017-01-01 RX ORDER — MORPHINE SULFATE 4 MG/ML
1 INJECTION, SOLUTION INTRAMUSCULAR; INTRAVENOUS ONCE
Status: COMPLETED | OUTPATIENT
Start: 2017-01-01 | End: 2017-01-01

## 2017-01-01 RX ORDER — CLONAZEPAM 0.5 MG/1
0.25 TABLET ORAL
Qty: 30 TAB | Refills: 0 | Status: SHIPPED | OUTPATIENT
Start: 2017-01-01 | End: 2017-01-01 | Stop reason: SDUPTHER

## 2017-01-01 RX ORDER — DIPHENHYDRAMINE HCL 12.5MG/5ML
6.25 LIQUID (ML) ORAL ONCE
Status: DISCONTINUED | OUTPATIENT
Start: 2017-01-01 | End: 2017-01-01 | Stop reason: DRUGHIGH

## 2017-01-01 RX ORDER — DEXTROSE MONOHYDRATE 25 G/50ML
25 INJECTION, SOLUTION INTRAVENOUS
Status: DISCONTINUED | OUTPATIENT
Start: 2017-01-01 | End: 2017-01-01 | Stop reason: HOSPADM

## 2017-01-01 RX ORDER — LORAZEPAM 2 MG/ML
1 INJECTION INTRAMUSCULAR EVERY 4 HOURS PRN
Status: DISCONTINUED | OUTPATIENT
Start: 2017-01-01 | End: 2017-01-01

## 2017-01-01 RX ORDER — AMLODIPINE BESYLATE 10 MG/1
10 TABLET ORAL DAILY
Status: DISCONTINUED | OUTPATIENT
Start: 2017-01-01 | End: 2017-01-01 | Stop reason: HOSPADM

## 2017-01-01 RX ORDER — LORAZEPAM 1 MG/1
0.5 TABLET ORAL ONCE
Status: COMPLETED | OUTPATIENT
Start: 2017-01-01 | End: 2017-01-01

## 2017-01-01 RX ORDER — HALOPERIDOL 5 MG/ML
2.5 INJECTION INTRAMUSCULAR ONCE
Status: DISCONTINUED | OUTPATIENT
Start: 2017-01-01 | End: 2017-01-01

## 2017-01-01 RX ORDER — BUDESONIDE AND FORMOTEROL FUMARATE DIHYDRATE 160; 4.5 UG/1; UG/1
2 AEROSOL RESPIRATORY (INHALATION) 2 TIMES DAILY
Qty: 1 INHALER | Refills: 5 | Status: SHIPPED | OUTPATIENT
Start: 2017-01-01 | End: 2017-01-01 | Stop reason: SDUPTHER

## 2017-01-01 RX ORDER — LISINOPRIL 20 MG/1
20 TABLET ORAL 2 TIMES DAILY
Status: DISCONTINUED | OUTPATIENT
Start: 2017-01-01 | End: 2017-01-01 | Stop reason: HOSPADM

## 2017-01-01 RX ORDER — DOXYCYCLINE 100 MG/1
100 TABLET ORAL EVERY 12 HOURS
Qty: 6 TAB | Refills: 0 | Status: SHIPPED | OUTPATIENT
Start: 2017-01-01 | End: 2017-01-01

## 2017-01-01 RX ORDER — CLONAZEPAM 0.5 MG/1
0.5 TABLET ORAL ONCE
Status: COMPLETED | OUTPATIENT
Start: 2017-01-01 | End: 2017-01-01

## 2017-01-01 RX ORDER — LORAZEPAM 2 MG/ML
0.5 INJECTION INTRAMUSCULAR EVERY 4 HOURS PRN
Status: DISCONTINUED | OUTPATIENT
Start: 2017-01-01 | End: 2017-01-01 | Stop reason: HOSPADM

## 2017-01-01 RX ORDER — SODIUM CHLORIDE 9 MG/ML
30 INJECTION, SOLUTION INTRAVENOUS
Status: COMPLETED | OUTPATIENT
Start: 2017-01-01 | End: 2017-01-01

## 2017-01-01 RX ORDER — DOXYCYCLINE 100 MG/1
100 TABLET ORAL EVERY 12 HOURS
Qty: 10 TAB | Refills: 0 | Status: SHIPPED | OUTPATIENT
Start: 2017-01-01 | End: 2017-01-01

## 2017-01-01 RX ORDER — METHYLPREDNISOLONE SODIUM SUCCINATE 125 MG/2ML
125 INJECTION, POWDER, LYOPHILIZED, FOR SOLUTION INTRAMUSCULAR; INTRAVENOUS ONCE
Status: COMPLETED | OUTPATIENT
Start: 2017-01-01 | End: 2017-01-01

## 2017-01-01 RX ORDER — AMLODIPINE BESYLATE 5 MG/1
5 TABLET ORAL
Status: DISCONTINUED | OUTPATIENT
Start: 2017-01-01 | End: 2017-01-01 | Stop reason: HOSPADM

## 2017-01-01 RX ORDER — LORAZEPAM 2 MG/ML
1 INJECTION INTRAMUSCULAR EVERY 6 HOURS PRN
Status: DISCONTINUED | OUTPATIENT
Start: 2017-01-01 | End: 2017-01-01

## 2017-01-01 RX ORDER — AMOXICILLIN AND CLAVULANATE POTASSIUM 875; 125 MG/1; MG/1
1 TABLET, FILM COATED ORAL 2 TIMES DAILY
COMMUNITY
Start: 2017-01-01

## 2017-01-01 RX ORDER — PREDNISONE 20 MG/1
20 TABLET ORAL DAILY
Qty: 3 TAB | Refills: 0 | Status: SHIPPED | OUTPATIENT
Start: 2017-01-01

## 2017-01-01 RX ORDER — PREDNISONE 20 MG/1
60 TABLET ORAL ONCE
Status: COMPLETED | OUTPATIENT
Start: 2017-01-01 | End: 2017-01-01

## 2017-01-01 RX ORDER — CLONAZEPAM 0.5 MG/1
TABLET ORAL
Qty: 60 TAB | Refills: 0 | Status: SHIPPED
Start: 2017-01-01 | End: 2017-01-01

## 2017-01-01 RX ORDER — IPRATROPIUM BROMIDE AND ALBUTEROL SULFATE 2.5; .5 MG/3ML; MG/3ML
3 SOLUTION RESPIRATORY (INHALATION) CONTINUOUS
Status: DISCONTINUED | OUTPATIENT
Start: 2017-01-01 | End: 2017-01-01 | Stop reason: HOSPADM

## 2017-01-01 RX ORDER — AZITHROMYCIN 500 MG/1
500 INJECTION, POWDER, LYOPHILIZED, FOR SOLUTION INTRAVENOUS ONCE
Status: COMPLETED | OUTPATIENT
Start: 2017-01-01 | End: 2017-01-01

## 2017-01-01 RX ORDER — LORAZEPAM 2 MG/ML
2 INJECTION INTRAMUSCULAR ONCE
Status: COMPLETED | OUTPATIENT
Start: 2017-01-01 | End: 2017-01-01

## 2017-01-01 RX ORDER — ACETAMINOPHEN 325 MG/1
650 TABLET ORAL EVERY 6 HOURS PRN
Status: DISCONTINUED | OUTPATIENT
Start: 2017-01-01 | End: 2017-01-01

## 2017-01-01 RX ORDER — LORAZEPAM 2 MG/ML
1 INJECTION INTRAMUSCULAR
Status: DISCONTINUED | OUTPATIENT
Start: 2017-01-01 | End: 2017-01-01 | Stop reason: HOSPADM

## 2017-01-01 RX ORDER — METOPROLOL SUCCINATE 50 MG/1
TABLET, EXTENDED RELEASE ORAL
Qty: 90 TAB | Refills: 0 | Status: SHIPPED | OUTPATIENT
Start: 2017-01-01 | End: 2017-01-01 | Stop reason: SDUPTHER

## 2017-01-01 RX ORDER — AZITHROMYCIN 250 MG/1
250 TABLET, FILM COATED ORAL EVERY 24 HOURS
Status: DISCONTINUED | OUTPATIENT
Start: 2017-01-01 | End: 2017-01-01

## 2017-01-01 RX ORDER — LISINOPRIL 20 MG/1
20 TABLET ORAL 2 TIMES DAILY
Status: DISCONTINUED | OUTPATIENT
Start: 2017-01-01 | End: 2017-01-01

## 2017-01-01 RX ORDER — SODIUM CHLORIDE 9 MG/ML
500 INJECTION, SOLUTION INTRAVENOUS ONCE
Status: COMPLETED | OUTPATIENT
Start: 2017-01-01 | End: 2017-01-01

## 2017-01-01 RX ORDER — ALBUTEROL SULFATE 90 UG/1
2 AEROSOL, METERED RESPIRATORY (INHALATION) EVERY 6 HOURS PRN
Qty: 8.5 G | Refills: 11 | Status: SHIPPED | OUTPATIENT
Start: 2017-01-01 | End: 2017-01-01 | Stop reason: SDUPTHER

## 2017-01-01 RX ORDER — ALBUTEROL SULFATE 90 UG/1
2 AEROSOL, METERED RESPIRATORY (INHALATION) EVERY 6 HOURS PRN
Qty: 1 INHALER | Refills: 2 | Status: SHIPPED | OUTPATIENT
Start: 2017-01-01 | End: 2017-01-01

## 2017-01-01 RX ORDER — LISINOPRIL 20 MG/1
20 TABLET ORAL
Status: DISCONTINUED | OUTPATIENT
Start: 2017-01-01 | End: 2017-01-01 | Stop reason: HOSPADM

## 2017-01-01 RX ORDER — DOXYCYCLINE 100 MG/1
100 TABLET ORAL EVERY 12 HOURS
Status: DISCONTINUED | OUTPATIENT
Start: 2017-01-01 | End: 2017-01-01

## 2017-01-01 RX ORDER — MORPHINE SULFATE 4 MG/ML
1 INJECTION, SOLUTION INTRAMUSCULAR; INTRAVENOUS EVERY 4 HOURS PRN
Status: DISCONTINUED | OUTPATIENT
Start: 2017-01-01 | End: 2017-01-01 | Stop reason: HOSPADM

## 2017-01-01 RX ORDER — LORAZEPAM 2 MG/ML
0.5 INJECTION INTRAMUSCULAR EVERY 4 HOURS PRN
Status: DISCONTINUED | OUTPATIENT
Start: 2017-01-01 | End: 2017-01-01

## 2017-01-01 RX ORDER — LISINOPRIL 20 MG/1
40 TABLET ORAL
Status: DISCONTINUED | OUTPATIENT
Start: 2017-01-01 | End: 2017-01-01 | Stop reason: HOSPADM

## 2017-01-01 RX ORDER — SODIUM CHLORIDE 9 MG/ML
INJECTION, SOLUTION INTRAVENOUS CONTINUOUS
Status: DISCONTINUED | OUTPATIENT
Start: 2017-01-01 | End: 2017-01-01 | Stop reason: HOSPADM

## 2017-01-01 RX ORDER — BUDESONIDE AND FORMOTEROL FUMARATE DIHYDRATE 160; 4.5 UG/1; UG/1
AEROSOL RESPIRATORY (INHALATION)
Qty: 1 INHALER | Refills: 5 | Status: SHIPPED | OUTPATIENT
Start: 2017-01-01

## 2017-01-01 RX ORDER — IPRATROPIUM BROMIDE AND ALBUTEROL SULFATE 2.5; .5 MG/3ML; MG/3ML
3 SOLUTION RESPIRATORY (INHALATION) 4 TIMES DAILY
Qty: 75 ML | Refills: 11 | Status: SHIPPED | OUTPATIENT
Start: 2017-01-01 | End: 2017-01-01 | Stop reason: SDUPTHER

## 2017-01-01 RX ADMIN — INSULIN LISPRO 1 UNITS: 100 INJECTION, SOLUTION INTRAVENOUS; SUBCUTANEOUS at 20:48

## 2017-01-01 RX ADMIN — CEFDINIR 300 MG: 300 CAPSULE ORAL at 08:33

## 2017-01-01 RX ADMIN — DOXYCYCLINE 100 MG: 100 TABLET ORAL at 21:05

## 2017-01-01 RX ADMIN — TIOTROPIUM BROMIDE 1 CAPSULE: 18 CAPSULE ORAL; RESPIRATORY (INHALATION) at 07:29

## 2017-01-01 RX ADMIN — FAMOTIDINE 20 MG: 20 TABLET, FILM COATED ORAL at 08:33

## 2017-01-01 RX ADMIN — SIMVASTATIN 20 MG: 40 TABLET, FILM COATED ORAL at 20:58

## 2017-01-01 RX ADMIN — IPRATROPIUM BROMIDE AND ALBUTEROL SULFATE 3 ML: .5; 3 SOLUTION RESPIRATORY (INHALATION) at 14:20

## 2017-01-01 RX ADMIN — INFLUENZA A VIRUSA/MICHIGAN/45/2015 X-275 (H1N1) ANTIGEN (FORMALDEHYDE INACTIVATED), INFLUENZA A VIRUS A/HONG KONG/4801/2014 X-263B (H3N2) ANTIGEN (FORMALDEHYDE INACTIVATED), AND INFLUENZA B VIRUS B/BRISBANE/60/2008 ANTIGEN (FORMALDEHYDE INACTIVATED) 0.5 ML: 60; 60; 60 INJECTION, SUSPENSION INTRAMUSCULAR at 05:25

## 2017-01-01 RX ADMIN — ALPRAZOLAM 0.25 MG: 0.25 TABLET ORAL at 18:19

## 2017-01-01 RX ADMIN — BUSPIRONE HYDROCHLORIDE 5 MG: 10 TABLET ORAL at 08:24

## 2017-01-01 RX ADMIN — AMLODIPINE BESYLATE 10 MG: 10 TABLET ORAL at 08:21

## 2017-01-01 RX ADMIN — AMLODIPINE BESYLATE 5 MG: 5 TABLET ORAL at 10:03

## 2017-01-01 RX ADMIN — AMLODIPINE BESYLATE 5 MG: 5 TABLET ORAL at 13:14

## 2017-01-01 RX ADMIN — ASPIRIN 325 MG: 325 TABLET, DELAYED RELEASE ORAL at 10:30

## 2017-01-01 RX ADMIN — NICOTINE 21 MG: 21 PATCH, EXTENDED RELEASE TRANSDERMAL at 06:16

## 2017-01-01 RX ADMIN — DOXYCYCLINE 100 MG: 100 TABLET ORAL at 10:04

## 2017-01-01 RX ADMIN — MAGNESIUM SULFATE IN WATER 2 G: 40 INJECTION, SOLUTION INTRAVENOUS at 01:27

## 2017-01-01 RX ADMIN — ASPIRIN 81 MG: 81 TABLET, COATED ORAL at 08:33

## 2017-01-01 RX ADMIN — METOPROLOL SUCCINATE 50 MG: 50 TABLET, EXTENDED RELEASE ORAL at 08:35

## 2017-01-01 RX ADMIN — BUDESONIDE AND FORMOTEROL FUMARATE DIHYDRATE 2 PUFF: 160; 4.5 AEROSOL RESPIRATORY (INHALATION) at 08:47

## 2017-01-01 RX ADMIN — ALPRAZOLAM 0.25 MG: 0.25 TABLET ORAL at 20:43

## 2017-01-01 RX ADMIN — LORAZEPAM 1 MG: 2 INJECTION INTRAMUSCULAR; INTRAVENOUS at 08:20

## 2017-01-01 RX ADMIN — TIOTROPIUM BROMIDE 1 CAPSULE: 18 CAPSULE ORAL; RESPIRATORY (INHALATION) at 07:18

## 2017-01-01 RX ADMIN — BUDESONIDE AND FORMOTEROL FUMARATE DIHYDRATE 2 PUFF: 160; 4.5 AEROSOL RESPIRATORY (INHALATION) at 07:18

## 2017-01-01 RX ADMIN — SODIUM CHLORIDE: 9 INJECTION, SOLUTION INTRAVENOUS at 06:38

## 2017-01-01 RX ADMIN — FAMOTIDINE 20 MG: 20 TABLET, FILM COATED ORAL at 15:30

## 2017-01-01 RX ADMIN — IPRATROPIUM BROMIDE AND ALBUTEROL SULFATE 3 ML: .5; 3 SOLUTION RESPIRATORY (INHALATION) at 10:53

## 2017-01-01 RX ADMIN — TIOTROPIUM BROMIDE 1 CAPSULE: 18 CAPSULE ORAL; RESPIRATORY (INHALATION) at 07:30

## 2017-01-01 RX ADMIN — CLONAZEPAM 0.5 MG: 0.5 TABLET ORAL at 09:30

## 2017-01-01 RX ADMIN — DOXYCYCLINE 100 MG: 100 TABLET ORAL at 21:31

## 2017-01-01 RX ADMIN — POTASSIUM CHLORIDE 40 MEQ: 1500 TABLET, EXTENDED RELEASE ORAL at 22:41

## 2017-01-01 RX ADMIN — LABETALOL HYDROCHLORIDE 10 MG: 5 INJECTION, SOLUTION INTRAVENOUS at 00:00

## 2017-01-01 RX ADMIN — IPRATROPIUM BROMIDE AND ALBUTEROL SULFATE 3 ML: .5; 3 SOLUTION RESPIRATORY (INHALATION) at 15:26

## 2017-01-01 RX ADMIN — ACETAMINOPHEN 650 MG: 325 TABLET, FILM COATED ORAL at 13:44

## 2017-01-01 RX ADMIN — IPRATROPIUM BROMIDE AND ALBUTEROL SULFATE 3 ML: .5; 3 SOLUTION RESPIRATORY (INHALATION) at 10:23

## 2017-01-01 RX ADMIN — IPRATROPIUM BROMIDE AND ALBUTEROL SULFATE 3 ML: .5; 3 SOLUTION RESPIRATORY (INHALATION) at 23:16

## 2017-01-01 RX ADMIN — IPRATROPIUM BROMIDE AND ALBUTEROL SULFATE 3 ML: .5; 3 SOLUTION RESPIRATORY (INHALATION) at 18:27

## 2017-01-01 RX ADMIN — LISINOPRIL 40 MG: 20 TABLET ORAL at 08:24

## 2017-01-01 RX ADMIN — FAMOTIDINE 20 MG: 20 TABLET, FILM COATED ORAL at 08:21

## 2017-01-01 RX ADMIN — AMLODIPINE BESYLATE 10 MG: 10 TABLET ORAL at 08:46

## 2017-01-01 RX ADMIN — POTASSIUM CHLORIDE 10 MEQ: 10 INJECTION, SOLUTION INTRAVENOUS at 22:15

## 2017-01-01 RX ADMIN — LISINOPRIL 20 MG: 20 TABLET ORAL at 10:03

## 2017-01-01 RX ADMIN — SODIUM CHLORIDE: 9 INJECTION, SOLUTION INTRAVENOUS at 23:31

## 2017-01-01 RX ADMIN — SODIUM CHLORIDE: 9 INJECTION, SOLUTION INTRAVENOUS at 18:46

## 2017-01-01 RX ADMIN — AMPICILLIN SODIUM AND SULBACTAM SODIUM 3 G: 2; 1 INJECTION, POWDER, FOR SOLUTION INTRAMUSCULAR; INTRAVENOUS at 23:25

## 2017-01-01 RX ADMIN — SODIUM CHLORIDE 500 ML: 9 INJECTION, SOLUTION INTRAVENOUS at 19:56

## 2017-01-01 RX ADMIN — BUDESONIDE AND FORMOTEROL FUMARATE DIHYDRATE 2 PUFF: 160; 4.5 AEROSOL RESPIRATORY (INHALATION) at 22:19

## 2017-01-01 RX ADMIN — METHYLPREDNISOLONE SODIUM SUCCINATE 125 MG: 125 INJECTION, POWDER, FOR SOLUTION INTRAMUSCULAR; INTRAVENOUS at 18:12

## 2017-01-01 RX ADMIN — FAMOTIDINE 20 MG: 20 TABLET, FILM COATED ORAL at 20:39

## 2017-01-01 RX ADMIN — ACETAMINOPHEN 650 MG: 325 TABLET, FILM COATED ORAL at 08:32

## 2017-01-01 RX ADMIN — IPRATROPIUM BROMIDE AND ALBUTEROL SULFATE 3 ML: .5; 3 SOLUTION RESPIRATORY (INHALATION) at 06:27

## 2017-01-01 RX ADMIN — ASPIRIN 81 MG: 81 TABLET, COATED ORAL at 08:35

## 2017-01-01 RX ADMIN — METOPROLOL SUCCINATE 50 MG: 50 TABLET, EXTENDED RELEASE ORAL at 08:21

## 2017-01-01 RX ADMIN — HALOPERIDOL LACTATE 2.5 MG: 5 INJECTION, SOLUTION INTRAMUSCULAR at 18:20

## 2017-01-01 RX ADMIN — NICOTINE 21 MG: 21 PATCH, EXTENDED RELEASE TRANSDERMAL at 06:22

## 2017-01-01 RX ADMIN — SODIUM CHLORIDE 500 ML: 9 INJECTION, SOLUTION INTRAVENOUS at 14:15

## 2017-01-01 RX ADMIN — IPRATROPIUM BROMIDE AND ALBUTEROL SULFATE 3 ML: .5; 3 SOLUTION RESPIRATORY (INHALATION) at 02:56

## 2017-01-01 RX ADMIN — AMPICILLIN SODIUM AND SULBACTAM SODIUM 3 G: 2; 1 INJECTION, POWDER, FOR SOLUTION INTRAMUSCULAR; INTRAVENOUS at 06:00

## 2017-01-01 RX ADMIN — SODIUM CHLORIDE: 9 INJECTION, SOLUTION INTRAVENOUS at 21:02

## 2017-01-01 RX ADMIN — DOXYCYCLINE 100 MG: 100 TABLET ORAL at 20:40

## 2017-01-01 RX ADMIN — DIPHENHYDRAMINE HYDROCHLORIDE 12.5 MG: 50 INJECTION, SOLUTION INTRAMUSCULAR; INTRAVENOUS at 18:27

## 2017-01-01 RX ADMIN — ACETAMINOPHEN 650 MG: 325 TABLET, FILM COATED ORAL at 10:37

## 2017-01-01 RX ADMIN — IPRATROPIUM BROMIDE AND ALBUTEROL SULFATE 3 ML: .5; 3 SOLUTION RESPIRATORY (INHALATION) at 10:00

## 2017-01-01 RX ADMIN — SODIUM CHLORIDE: 9 INJECTION, SOLUTION INTRAVENOUS at 05:43

## 2017-01-01 RX ADMIN — BUDESONIDE AND FORMOTEROL FUMARATE DIHYDRATE 2 PUFF: 160; 4.5 AEROSOL RESPIRATORY (INHALATION) at 08:52

## 2017-01-01 RX ADMIN — FAMOTIDINE 20 MG: 20 TABLET, FILM COATED ORAL at 21:14

## 2017-01-01 RX ADMIN — FAMOTIDINE 20 MG: 20 TABLET, FILM COATED ORAL at 21:03

## 2017-01-01 RX ADMIN — ENOXAPARIN SODIUM 40 MG: 100 INJECTION SUBCUTANEOUS at 20:40

## 2017-01-01 RX ADMIN — TIOTROPIUM BROMIDE 1 CAPSULE: 18 CAPSULE ORAL; RESPIRATORY (INHALATION) at 08:52

## 2017-01-01 RX ADMIN — ALPRAZOLAM 0.25 MG: 0.25 TABLET ORAL at 05:06

## 2017-01-01 RX ADMIN — LISINOPRIL 40 MG: 20 TABLET ORAL at 08:29

## 2017-01-01 RX ADMIN — PREDNISONE 60 MG: 20 TABLET ORAL at 17:44

## 2017-01-01 RX ADMIN — CLONAZEPAM 0.25 MG: 0.5 TABLET ORAL at 20:58

## 2017-01-01 RX ADMIN — AMLODIPINE BESYLATE 10 MG: 10 TABLET ORAL at 08:23

## 2017-01-01 RX ADMIN — ALPRAZOLAM 0.25 MG: 0.25 TABLET ORAL at 12:09

## 2017-01-01 RX ADMIN — FAMOTIDINE 20 MG: 20 TABLET, FILM COATED ORAL at 08:47

## 2017-01-01 RX ADMIN — HYDRALAZINE HYDROCHLORIDE 10 MG: 20 INJECTION INTRAMUSCULAR; INTRAVENOUS at 11:50

## 2017-01-01 RX ADMIN — IPRATROPIUM BROMIDE AND ALBUTEROL SULFATE 3 ML: .5; 3 SOLUTION RESPIRATORY (INHALATION) at 14:30

## 2017-01-01 RX ADMIN — CEFDINIR 300 MG: 300 CAPSULE ORAL at 08:35

## 2017-01-01 RX ADMIN — SIMVASTATIN 20 MG: 20 TABLET, FILM COATED ORAL at 20:43

## 2017-01-01 RX ADMIN — METOPROLOL SUCCINATE 50 MG: 50 TABLET, EXTENDED RELEASE ORAL at 09:39

## 2017-01-01 RX ADMIN — METHYLPREDNISOLONE SODIUM SUCCINATE 40 MG: 40 INJECTION, POWDER, FOR SOLUTION INTRAMUSCULAR; INTRAVENOUS at 08:37

## 2017-01-01 RX ADMIN — IPRATROPIUM BROMIDE AND ALBUTEROL SULFATE 3 ML: .5; 3 SOLUTION RESPIRATORY (INHALATION) at 11:35

## 2017-01-01 RX ADMIN — MORPHINE SULFATE 1 MG: 4 INJECTION INTRAVENOUS at 21:03

## 2017-01-01 RX ADMIN — IPRATROPIUM BROMIDE AND ALBUTEROL SULFATE 3 ML: .5; 3 SOLUTION RESPIRATORY (INHALATION) at 10:15

## 2017-01-01 RX ADMIN — IPRATROPIUM BROMIDE AND ALBUTEROL SULFATE 3 ML: .5; 3 SOLUTION RESPIRATORY (INHALATION) at 06:07

## 2017-01-01 RX ADMIN — CEFDINIR 300 MG: 300 CAPSULE ORAL at 08:47

## 2017-01-01 RX ADMIN — SIMVASTATIN 20 MG: 40 TABLET, FILM COATED ORAL at 21:03

## 2017-01-01 RX ADMIN — SODIUM CHLORIDE 1000 ML: 9 INJECTION, SOLUTION INTRAVENOUS at 18:13

## 2017-01-01 RX ADMIN — CEFDINIR 300 MG: 300 CAPSULE ORAL at 11:37

## 2017-01-01 RX ADMIN — PREDNISONE 40 MG: 20 TABLET ORAL at 08:34

## 2017-01-01 RX ADMIN — PREDNISONE 40 MG: 20 TABLET ORAL at 15:29

## 2017-01-01 RX ADMIN — ACETAMINOPHEN 650 MG: 325 TABLET, FILM COATED ORAL at 21:15

## 2017-01-01 RX ADMIN — NICOTINE 21 MG: 21 PATCH, EXTENDED RELEASE TRANSDERMAL at 05:57

## 2017-01-01 RX ADMIN — NICOTINE 21 MG: 21 PATCH, EXTENDED RELEASE TRANSDERMAL at 05:01

## 2017-01-01 RX ADMIN — DEXAMETHASONE SODIUM PHOSPHATE 6 MG: 4 INJECTION, SOLUTION INTRAMUSCULAR; INTRAVENOUS at 18:40

## 2017-01-01 RX ADMIN — METHYLPREDNISOLONE SODIUM SUCCINATE 40 MG: 40 INJECTION, POWDER, FOR SOLUTION INTRAMUSCULAR; INTRAVENOUS at 03:14

## 2017-01-01 RX ADMIN — DOXYCYCLINE 100 MG: 100 TABLET ORAL at 20:49

## 2017-01-01 RX ADMIN — IPRATROPIUM BROMIDE AND ALBUTEROL SULFATE 3 ML: .5; 3 SOLUTION RESPIRATORY (INHALATION) at 15:41

## 2017-01-01 RX ADMIN — ENOXAPARIN SODIUM 40 MG: 100 INJECTION SUBCUTANEOUS at 20:59

## 2017-01-01 RX ADMIN — ACETAMINOPHEN 650 MG: 325 TABLET, FILM COATED ORAL at 06:03

## 2017-01-01 RX ADMIN — BUDESONIDE AND FORMOTEROL FUMARATE DIHYDRATE 2 PUFF: 160; 4.5 AEROSOL RESPIRATORY (INHALATION) at 20:20

## 2017-01-01 RX ADMIN — LORAZEPAM 1 MG: 2 INJECTION INTRAMUSCULAR; INTRAVENOUS at 11:25

## 2017-01-01 RX ADMIN — CEFTRIAXONE SODIUM 1 G: 1 INJECTION, POWDER, FOR SOLUTION INTRAMUSCULAR; INTRAVENOUS at 09:44

## 2017-01-01 RX ADMIN — HYDRALAZINE HYDROCHLORIDE 10 MG: 20 INJECTION INTRAMUSCULAR; INTRAVENOUS at 16:42

## 2017-01-01 RX ADMIN — CEFDINIR 300 MG: 300 CAPSULE ORAL at 20:48

## 2017-01-01 RX ADMIN — IPRATROPIUM BROMIDE AND ALBUTEROL SULFATE 3 ML: .5; 3 SOLUTION RESPIRATORY (INHALATION) at 10:40

## 2017-01-01 RX ADMIN — MORPHINE SULFATE 1 MG: 4 INJECTION INTRAVENOUS at 02:17

## 2017-01-01 RX ADMIN — BUDESONIDE AND FORMOTEROL FUMARATE DIHYDRATE 2 PUFF: 160; 4.5 AEROSOL RESPIRATORY (INHALATION) at 23:15

## 2017-01-01 RX ADMIN — STANDARDIZED SENNA CONCENTRATE AND DOCUSATE SODIUM 2 TABLET: 8.6; 5 TABLET, FILM COATED ORAL at 20:39

## 2017-01-01 RX ADMIN — POTASSIUM CHLORIDE 40 MEQ: 1500 TABLET, EXTENDED RELEASE ORAL at 08:31

## 2017-01-01 RX ADMIN — POTASSIUM CHLORIDE 20 MEQ: 1500 TABLET, EXTENDED RELEASE ORAL at 18:50

## 2017-01-01 RX ADMIN — IPRATROPIUM BROMIDE AND ALBUTEROL SULFATE 3 ML: .5; 3 SOLUTION RESPIRATORY (INHALATION) at 21:06

## 2017-01-01 RX ADMIN — TIOTROPIUM BROMIDE 1 CAPSULE: 18 CAPSULE ORAL; RESPIRATORY (INHALATION) at 06:31

## 2017-01-01 RX ADMIN — LISINOPRIL 40 MG: 20 TABLET ORAL at 08:34

## 2017-01-01 RX ADMIN — SIMVASTATIN 20 MG: 40 TABLET, FILM COATED ORAL at 21:04

## 2017-01-01 RX ADMIN — INSULIN LISPRO 1 UNITS: 100 INJECTION, SOLUTION INTRAVENOUS; SUBCUTANEOUS at 17:19

## 2017-01-01 RX ADMIN — ENOXAPARIN SODIUM 40 MG: 100 INJECTION SUBCUTANEOUS at 09:58

## 2017-01-01 RX ADMIN — ACETAMINOPHEN 650 MG: 325 TABLET, FILM COATED ORAL at 12:10

## 2017-01-01 RX ADMIN — METOPROLOL SUCCINATE 50 MG: 50 TABLET, EXTENDED RELEASE ORAL at 08:24

## 2017-01-01 RX ADMIN — NICOTINE 21 MG: 21 PATCH, EXTENDED RELEASE TRANSDERMAL at 05:26

## 2017-01-01 RX ADMIN — IPRATROPIUM BROMIDE AND ALBUTEROL SULFATE 3 ML: .5; 3 SOLUTION RESPIRATORY (INHALATION) at 14:09

## 2017-01-01 RX ADMIN — METOPROLOL SUCCINATE 50 MG: 50 TABLET, EXTENDED RELEASE ORAL at 08:29

## 2017-01-01 RX ADMIN — BUDESONIDE AND FORMOTEROL FUMARATE DIHYDRATE 2 PUFF: 160; 4.5 AEROSOL RESPIRATORY (INHALATION) at 10:00

## 2017-01-01 RX ADMIN — BUSPIRONE HYDROCHLORIDE 5 MG: 10 TABLET ORAL at 08:36

## 2017-01-01 RX ADMIN — SODIUM CHLORIDE 1000 ML: 9 INJECTION, SOLUTION INTRAVENOUS at 18:00

## 2017-01-01 RX ADMIN — DOXYCYCLINE 100 MG: 100 TABLET ORAL at 08:30

## 2017-01-01 RX ADMIN — ACETAMINOPHEN 650 MG: 325 TABLET, FILM COATED ORAL at 18:03

## 2017-01-01 RX ADMIN — AMLODIPINE BESYLATE 10 MG: 10 TABLET ORAL at 08:39

## 2017-01-01 RX ADMIN — IPRATROPIUM BROMIDE AND ALBUTEROL SULFATE 3 ML: .5; 3 SOLUTION RESPIRATORY (INHALATION) at 06:45

## 2017-01-01 RX ADMIN — BUSPIRONE HYDROCHLORIDE 5 MG: 10 TABLET ORAL at 08:34

## 2017-01-01 RX ADMIN — DOXYCYCLINE 100 MG: 100 TABLET ORAL at 21:00

## 2017-01-01 RX ADMIN — IPRATROPIUM BROMIDE AND ALBUTEROL SULFATE 3 ML: .5; 3 SOLUTION RESPIRATORY (INHALATION) at 06:09

## 2017-01-01 RX ADMIN — METOPROLOL SUCCINATE 50 MG: 50 TABLET, EXTENDED RELEASE ORAL at 08:30

## 2017-01-01 RX ADMIN — DOXYCYCLINE 100 MG: 100 TABLET ORAL at 08:36

## 2017-01-01 RX ADMIN — IPRATROPIUM BROMIDE AND ALBUTEROL SULFATE 3 ML: .5; 3 SOLUTION RESPIRATORY (INHALATION) at 07:09

## 2017-01-01 RX ADMIN — INSULIN LISPRO 1 UNITS: 100 INJECTION, SOLUTION INTRAVENOUS; SUBCUTANEOUS at 11:46

## 2017-01-01 RX ADMIN — METHYLPREDNISOLONE SODIUM SUCCINATE 40 MG: 40 INJECTION, POWDER, FOR SOLUTION INTRAMUSCULAR; INTRAVENOUS at 14:01

## 2017-01-01 RX ADMIN — BUDESONIDE AND FORMOTEROL FUMARATE DIHYDRATE 2 PUFF: 160; 4.5 AEROSOL RESPIRATORY (INHALATION) at 20:58

## 2017-01-01 RX ADMIN — PREDNISONE 40 MG: 20 TABLET ORAL at 08:46

## 2017-01-01 RX ADMIN — LORAZEPAM 1 MG: 2 INJECTION INTRAMUSCULAR; INTRAVENOUS at 14:45

## 2017-01-01 RX ADMIN — PREDNISONE 40 MG: 20 TABLET ORAL at 08:31

## 2017-01-01 RX ADMIN — BUDESONIDE AND FORMOTEROL FUMARATE DIHYDRATE 2 PUFF: 160; 4.5 AEROSOL RESPIRATORY (INHALATION) at 06:09

## 2017-01-01 RX ADMIN — LISINOPRIL 40 MG: 20 TABLET ORAL at 08:21

## 2017-01-01 RX ADMIN — BUDESONIDE AND FORMOTEROL FUMARATE DIHYDRATE 2 PUFF: 160; 4.5 AEROSOL RESPIRATORY (INHALATION) at 06:45

## 2017-01-01 RX ADMIN — BUSPIRONE HYDROCHLORIDE 5 MG: 10 TABLET ORAL at 08:46

## 2017-01-01 RX ADMIN — AMLODIPINE BESYLATE 10 MG: 10 TABLET ORAL at 08:36

## 2017-01-01 RX ADMIN — METOPROLOL SUCCINATE 50 MG: 50 TABLET, EXTENDED RELEASE ORAL at 08:46

## 2017-01-01 RX ADMIN — ENOXAPARIN SODIUM 40 MG: 100 INJECTION SUBCUTANEOUS at 10:03

## 2017-01-01 RX ADMIN — CLONAZEPAM 0.25 MG: 0.5 TABLET ORAL at 20:39

## 2017-01-01 RX ADMIN — ACETAMINOPHEN 650 MG: 325 TABLET, FILM COATED ORAL at 02:44

## 2017-01-01 RX ADMIN — METOPROLOL SUCCINATE 50 MG: 50 TABLET, EXTENDED RELEASE ORAL at 08:40

## 2017-01-01 RX ADMIN — POTASSIUM CHLORIDE 10 MEQ: 7.46 INJECTION, SOLUTION INTRAVENOUS at 19:17

## 2017-01-01 RX ADMIN — SODIUM CHLORIDE 500 ML: 9 INJECTION, SOLUTION INTRAVENOUS at 03:46

## 2017-01-01 RX ADMIN — METHYLPREDNISOLONE SODIUM SUCCINATE 40 MG: 40 INJECTION, POWDER, FOR SOLUTION INTRAMUSCULAR; INTRAVENOUS at 20:57

## 2017-01-01 RX ADMIN — BUDESONIDE AND FORMOTEROL FUMARATE DIHYDRATE 2 PUFF: 160; 4.5 AEROSOL RESPIRATORY (INHALATION) at 21:04

## 2017-01-01 RX ADMIN — DOXYCYCLINE 100 MG: 100 TABLET ORAL at 08:20

## 2017-01-01 RX ADMIN — MORPHINE SULFATE 1 MG: 4 INJECTION INTRAVENOUS at 06:22

## 2017-01-01 RX ADMIN — INSULIN LISPRO 1 UNITS: 100 INJECTION, SOLUTION INTRAVENOUS; SUBCUTANEOUS at 16:58

## 2017-01-01 RX ADMIN — CLONAZEPAM 0.5 MG: 0.5 TABLET ORAL at 20:48

## 2017-01-01 RX ADMIN — IPRATROPIUM BROMIDE AND ALBUTEROL SULFATE 3 ML: .5; 3 SOLUTION RESPIRATORY (INHALATION) at 10:09

## 2017-01-01 RX ADMIN — DOXYCYCLINE 100 MG: 100 TABLET ORAL at 20:59

## 2017-01-01 RX ADMIN — ACETAMINOPHEN 650 MG: 325 TABLET, FILM COATED ORAL at 18:38

## 2017-01-01 RX ADMIN — TIOTROPIUM BROMIDE 1 CAPSULE: 18 CAPSULE ORAL; RESPIRATORY (INHALATION) at 10:07

## 2017-01-01 RX ADMIN — LORAZEPAM 2 MG: 2 INJECTION INTRAMUSCULAR; INTRAVENOUS at 23:23

## 2017-01-01 RX ADMIN — BUDESONIDE AND FORMOTEROL FUMARATE DIHYDRATE 2 PUFF: 160; 4.5 AEROSOL RESPIRATORY (INHALATION) at 10:02

## 2017-01-01 RX ADMIN — PREDNISONE 40 MG: 20 TABLET ORAL at 08:39

## 2017-01-01 RX ADMIN — CEFDINIR 300 MG: 300 CAPSULE ORAL at 20:58

## 2017-01-01 RX ADMIN — IPRATROPIUM BROMIDE AND ALBUTEROL SULFATE 3 ML: .5; 3 SOLUTION RESPIRATORY (INHALATION) at 14:14

## 2017-01-01 RX ADMIN — ONDANSETRON 4 MG: 2 INJECTION, SOLUTION INTRAMUSCULAR; INTRAVENOUS at 06:25

## 2017-01-01 RX ADMIN — METHYLPREDNISOLONE SODIUM SUCCINATE 62.5 MG: 125 INJECTION, POWDER, FOR SOLUTION INTRAMUSCULAR; INTRAVENOUS at 20:43

## 2017-01-01 RX ADMIN — IPRATROPIUM BROMIDE AND ALBUTEROL SULFATE 3 ML: .5; 3 SOLUTION RESPIRATORY (INHALATION) at 19:34

## 2017-01-01 RX ADMIN — INSULIN LISPRO 1 UNITS: 100 INJECTION, SOLUTION INTRAVENOUS; SUBCUTANEOUS at 17:20

## 2017-01-01 RX ADMIN — LISINOPRIL 20 MG: 20 TABLET ORAL at 10:32

## 2017-01-01 RX ADMIN — IPRATROPIUM BROMIDE AND ALBUTEROL SULFATE 3 ML: .5; 3 SOLUTION RESPIRATORY (INHALATION) at 02:06

## 2017-01-01 RX ADMIN — BUDESONIDE AND FORMOTEROL FUMARATE DIHYDRATE 2 PUFF: 160; 4.5 AEROSOL RESPIRATORY (INHALATION) at 20:15

## 2017-01-01 RX ADMIN — IPRATROPIUM BROMIDE AND ALBUTEROL SULFATE 3 ML: .5; 3 SOLUTION RESPIRATORY (INHALATION) at 18:43

## 2017-01-01 RX ADMIN — DOXYCYCLINE 100 MG: 100 TABLET ORAL at 10:02

## 2017-01-01 RX ADMIN — MORPHINE SULFATE 1 MG: 4 INJECTION INTRAVENOUS at 13:56

## 2017-01-01 RX ADMIN — DOXYCYCLINE 100 MG: 100 TABLET ORAL at 08:46

## 2017-01-01 RX ADMIN — SODIUM CHLORIDE: 9 INJECTION, SOLUTION INTRAVENOUS at 02:25

## 2017-01-01 RX ADMIN — DOXYCYCLINE 100 MG: 100 TABLET ORAL at 20:42

## 2017-01-01 RX ADMIN — ACETAMINOPHEN 650 MG: 325 TABLET, FILM COATED ORAL at 20:38

## 2017-01-01 RX ADMIN — BUDESONIDE AND FORMOTEROL FUMARATE DIHYDRATE 2 PUFF: 160; 4.5 AEROSOL RESPIRATORY (INHALATION) at 08:34

## 2017-01-01 RX ADMIN — BUDESONIDE AND FORMOTEROL FUMARATE DIHYDRATE 2 PUFF: 160; 4.5 AEROSOL RESPIRATORY (INHALATION) at 08:28

## 2017-01-01 RX ADMIN — FAMOTIDINE 20 MG: 20 TABLET, FILM COATED ORAL at 08:39

## 2017-01-01 RX ADMIN — ONDANSETRON 4 MG: 4 TABLET, ORALLY DISINTEGRATING ORAL at 07:43

## 2017-01-01 RX ADMIN — IPRATROPIUM BROMIDE AND ALBUTEROL SULFATE 3 ML: .5; 3 SOLUTION RESPIRATORY (INHALATION) at 10:54

## 2017-01-01 RX ADMIN — SIMVASTATIN 20 MG: 40 TABLET, FILM COATED ORAL at 21:00

## 2017-01-01 RX ADMIN — IPRATROPIUM BROMIDE AND ALBUTEROL SULFATE 3 ML: .5; 3 SOLUTION RESPIRATORY (INHALATION) at 08:51

## 2017-01-01 RX ADMIN — CLONAZEPAM 0.25 MG: 0.5 TABLET ORAL at 21:02

## 2017-01-01 RX ADMIN — DOXYCYCLINE 100 MG: 100 TABLET ORAL at 08:33

## 2017-01-01 RX ADMIN — ACETAMINOPHEN 650 MG: 325 TABLET, FILM COATED ORAL at 14:34

## 2017-01-01 RX ADMIN — MAGNESIUM SULFATE IN WATER 2 G: 40 INJECTION, SOLUTION INTRAVENOUS at 12:23

## 2017-01-01 RX ADMIN — BUDESONIDE AND FORMOTEROL FUMARATE DIHYDRATE 2 PUFF: 160; 4.5 AEROSOL RESPIRATORY (INHALATION) at 19:06

## 2017-01-01 RX ADMIN — IPRATROPIUM BROMIDE AND ALBUTEROL SULFATE 3 ML: .5; 3 SOLUTION RESPIRATORY (INHALATION) at 20:08

## 2017-01-01 RX ADMIN — IPRATROPIUM BROMIDE AND ALBUTEROL SULFATE 3 ML: .5; 3 SOLUTION RESPIRATORY (INHALATION) at 07:18

## 2017-01-01 RX ADMIN — METHYLPREDNISOLONE SODIUM SUCCINATE 40 MG: 40 INJECTION, POWDER, FOR SOLUTION INTRAMUSCULAR; INTRAVENOUS at 21:02

## 2017-01-01 RX ADMIN — BUSPIRONE HYDROCHLORIDE 5 MG: 10 TABLET ORAL at 08:28

## 2017-01-01 RX ADMIN — METHYLPREDNISOLONE SODIUM SUCCINATE 62.5 MG: 125 INJECTION, POWDER, FOR SOLUTION INTRAMUSCULAR; INTRAVENOUS at 20:48

## 2017-01-01 RX ADMIN — LORAZEPAM 1 MG: 2 INJECTION INTRAMUSCULAR; INTRAVENOUS at 03:55

## 2017-01-01 RX ADMIN — SODIUM CHLORIDE: 9 INJECTION, SOLUTION INTRAVENOUS at 06:23

## 2017-01-01 RX ADMIN — IPRATROPIUM BROMIDE AND ALBUTEROL SULFATE 3 ML: .5; 3 SOLUTION RESPIRATORY (INHALATION) at 21:10

## 2017-01-01 RX ADMIN — HEPARIN SODIUM 5000 UNITS: 5000 INJECTION, SOLUTION INTRAVENOUS; SUBCUTANEOUS at 14:44

## 2017-01-01 RX ADMIN — ACETAMINOPHEN 650 MG: 325 TABLET, FILM COATED ORAL at 08:29

## 2017-01-01 RX ADMIN — LISINOPRIL 40 MG: 20 TABLET ORAL at 08:38

## 2017-01-01 RX ADMIN — BUSPIRONE HYDROCHLORIDE 5 MG: 10 TABLET ORAL at 08:40

## 2017-01-01 RX ADMIN — BUDESONIDE AND FORMOTEROL FUMARATE DIHYDRATE 2 PUFF: 160; 4.5 AEROSOL RESPIRATORY (INHALATION) at 07:30

## 2017-01-01 RX ADMIN — IPRATROPIUM BROMIDE AND ALBUTEROL SULFATE 3 ML: .5; 3 SOLUTION RESPIRATORY (INHALATION) at 03:00

## 2017-01-01 RX ADMIN — TIOTROPIUM BROMIDE 1 CAPSULE: 18 CAPSULE ORAL; RESPIRATORY (INHALATION) at 06:10

## 2017-01-01 RX ADMIN — SIMVASTATIN 20 MG: 20 TABLET, FILM COATED ORAL at 22:40

## 2017-01-01 RX ADMIN — SODIUM CHLORIDE: 9 INJECTION, SOLUTION INTRAVENOUS at 10:44

## 2017-01-01 RX ADMIN — AZITHROMYCIN MONOHYDRATE 500 MG: 500 INJECTION, POWDER, LYOPHILIZED, FOR SOLUTION INTRAVENOUS at 20:55

## 2017-01-01 RX ADMIN — PREDNISONE 40 MG: 20 TABLET ORAL at 08:21

## 2017-01-01 RX ADMIN — ALPRAZOLAM 0.25 MG: 0.25 TABLET ORAL at 05:52

## 2017-01-01 RX ADMIN — BUDESONIDE AND FORMOTEROL FUMARATE DIHYDRATE 2 PUFF: 160; 4.5 AEROSOL RESPIRATORY (INHALATION) at 19:50

## 2017-01-01 RX ADMIN — AMLODIPINE BESYLATE 10 MG: 10 TABLET ORAL at 08:28

## 2017-01-01 RX ADMIN — IPRATROPIUM BROMIDE AND ALBUTEROL SULFATE 3 ML: .5; 3 SOLUTION RESPIRATORY (INHALATION) at 09:59

## 2017-01-01 RX ADMIN — CEFDINIR 300 MG: 300 CAPSULE ORAL at 08:30

## 2017-01-01 RX ADMIN — SIMVASTATIN 20 MG: 20 TABLET, FILM COATED ORAL at 20:48

## 2017-01-01 RX ADMIN — CEFDINIR 300 MG: 300 CAPSULE ORAL at 21:03

## 2017-01-01 RX ADMIN — DOXYCYCLINE 100 MG: 100 TABLET ORAL at 09:42

## 2017-01-01 RX ADMIN — ALPRAZOLAM 0.25 MG: 0.25 TABLET ORAL at 05:28

## 2017-01-01 RX ADMIN — IPRATROPIUM BROMIDE AND ALBUTEROL SULFATE 3 ML: .5; 3 SOLUTION RESPIRATORY (INHALATION) at 13:55

## 2017-01-01 RX ADMIN — ENOXAPARIN SODIUM 40 MG: 100 INJECTION SUBCUTANEOUS at 21:02

## 2017-01-01 RX ADMIN — CEFDINIR 300 MG: 300 CAPSULE ORAL at 08:36

## 2017-01-01 RX ADMIN — ALBUTEROL SULFATE 2.5 MG: 2.5 SOLUTION RESPIRATORY (INHALATION) at 18:26

## 2017-01-01 RX ADMIN — ACETAMINOPHEN 650 MG: 325 TABLET, FILM COATED ORAL at 08:48

## 2017-01-01 RX ADMIN — ENOXAPARIN SODIUM 40 MG: 100 INJECTION SUBCUTANEOUS at 20:39

## 2017-01-01 RX ADMIN — LORAZEPAM 0.5 MG: 1 TABLET ORAL at 18:40

## 2017-01-01 RX ADMIN — CEFDINIR 300 MG: 300 CAPSULE ORAL at 21:05

## 2017-01-01 RX ADMIN — NICOTINE 21 MG: 21 PATCH, EXTENDED RELEASE TRANSDERMAL at 05:29

## 2017-01-01 RX ADMIN — SIMVASTATIN 20 MG: 40 TABLET, FILM COATED ORAL at 20:39

## 2017-01-01 RX ADMIN — SIMVASTATIN 20 MG: 20 TABLET, FILM COATED ORAL at 22:28

## 2017-01-01 RX ADMIN — DOXYCYCLINE 100 MG: 100 TABLET ORAL at 08:29

## 2017-01-01 RX ADMIN — IPRATROPIUM BROMIDE AND ALBUTEROL SULFATE 3 ML: .5; 3 SOLUTION RESPIRATORY (INHALATION) at 12:23

## 2017-01-01 RX ADMIN — IPRATROPIUM BROMIDE AND ALBUTEROL SULFATE 3 ML: .5; 3 SOLUTION RESPIRATORY (INHALATION) at 22:38

## 2017-01-01 RX ADMIN — SODIUM CHLORIDE: 9 INJECTION, SOLUTION INTRAVENOUS at 05:59

## 2017-01-01 RX ADMIN — LORAZEPAM 1 MG: 2 INJECTION INTRAMUSCULAR; INTRAVENOUS at 05:48

## 2017-01-01 RX ADMIN — METOPROLOL SUCCINATE 50 MG: 50 TABLET, EXTENDED RELEASE ORAL at 10:03

## 2017-01-01 RX ADMIN — LORAZEPAM 0.5 MG: 2 INJECTION INTRAMUSCULAR; INTRAVENOUS at 21:03

## 2017-01-01 RX ADMIN — METHYLPREDNISOLONE SODIUM SUCCINATE 40 MG: 40 INJECTION, POWDER, FOR SOLUTION INTRAMUSCULAR; INTRAVENOUS at 23:31

## 2017-01-01 RX ADMIN — DOXYCYCLINE 100 MG: 100 TABLET ORAL at 21:14

## 2017-01-01 RX ADMIN — IPRATROPIUM BROMIDE AND ALBUTEROL SULFATE 3 ML: .5; 3 SOLUTION RESPIRATORY (INHALATION) at 16:34

## 2017-01-01 RX ADMIN — METHYLPREDNISOLONE SODIUM SUCCINATE 62.5 MG: 125 INJECTION, POWDER, FOR SOLUTION INTRAMUSCULAR; INTRAVENOUS at 09:59

## 2017-01-01 RX ADMIN — SIMVASTATIN 20 MG: 40 TABLET, FILM COATED ORAL at 21:15

## 2017-01-01 RX ADMIN — BUDESONIDE AND FORMOTEROL FUMARATE DIHYDRATE 2 PUFF: 160; 4.5 AEROSOL RESPIRATORY (INHALATION) at 06:38

## 2017-01-01 RX ADMIN — METHYLPREDNISOLONE SODIUM SUCCINATE 40 MG: 40 INJECTION, POWDER, FOR SOLUTION INTRAMUSCULAR; INTRAVENOUS at 03:12

## 2017-01-01 RX ADMIN — IPRATROPIUM BROMIDE AND ALBUTEROL SULFATE 3 ML: .5; 3 SOLUTION RESPIRATORY (INHALATION) at 22:12

## 2017-01-01 RX ADMIN — LISINOPRIL 40 MG: 20 TABLET ORAL at 08:46

## 2017-01-01 RX ADMIN — METHYLPREDNISOLONE SODIUM SUCCINATE 62.5 MG: 125 INJECTION, POWDER, FOR SOLUTION INTRAMUSCULAR; INTRAVENOUS at 09:45

## 2017-01-01 RX ADMIN — SODIUM CHLORIDE 1380 ML: 9 INJECTION, SOLUTION INTRAVENOUS at 01:07

## 2017-01-01 RX ADMIN — ASPIRIN 325 MG: 325 TABLET, DELAYED RELEASE ORAL at 10:01

## 2017-01-01 RX ADMIN — ASPIRIN 81 MG: 81 TABLET, COATED ORAL at 08:29

## 2017-01-01 RX ADMIN — ACETAMINOPHEN 650 MG: 325 TABLET, FILM COATED ORAL at 04:40

## 2017-01-01 RX ADMIN — NICOTINE 14 MG: 14 PATCH, EXTENDED RELEASE TRANSDERMAL at 06:29

## 2017-01-01 RX ADMIN — ACETAMINOPHEN 650 MG: 325 TABLET, FILM COATED ORAL at 16:31

## 2017-01-01 RX ADMIN — INSULIN LISPRO 1 UNITS: 100 INJECTION, SOLUTION INTRAVENOUS; SUBCUTANEOUS at 17:08

## 2017-01-01 RX ADMIN — ASPIRIN 81 MG: 81 TABLET, COATED ORAL at 08:23

## 2017-01-01 RX ADMIN — METHYLPREDNISOLONE SODIUM SUCCINATE 62.5 MG: 125 INJECTION, POWDER, FOR SOLUTION INTRAMUSCULAR; INTRAVENOUS at 10:02

## 2017-01-01 RX ADMIN — SODIUM CHLORIDE: 9 INJECTION, SOLUTION INTRAVENOUS at 12:15

## 2017-01-01 RX ADMIN — AMPICILLIN SODIUM AND SULBACTAM SODIUM 3 G: 2; 1 INJECTION, POWDER, FOR SOLUTION INTRAMUSCULAR; INTRAVENOUS at 19:55

## 2017-01-01 RX ADMIN — TIOTROPIUM BROMIDE 1 CAPSULE: 18 CAPSULE ORAL; RESPIRATORY (INHALATION) at 09:57

## 2017-01-01 RX ADMIN — METHYLPREDNISOLONE SODIUM SUCCINATE 62.5 MG: 125 INJECTION, POWDER, FOR SOLUTION INTRAMUSCULAR; INTRAVENOUS at 10:35

## 2017-01-01 RX ADMIN — CLONAZEPAM 0.25 MG: 0.5 TABLET ORAL at 22:40

## 2017-01-01 RX ADMIN — IPRATROPIUM BROMIDE AND ALBUTEROL SULFATE 3 ML: .5; 3 SOLUTION RESPIRATORY (INHALATION) at 20:17

## 2017-01-01 RX ADMIN — SIMVASTATIN 20 MG: 20 TABLET, FILM COATED ORAL at 21:31

## 2017-01-01 RX ADMIN — ASPIRIN 81 MG: 81 TABLET, COATED ORAL at 08:47

## 2017-01-01 RX ADMIN — ALPRAZOLAM 0.25 MG: 0.25 TABLET ORAL at 13:53

## 2017-01-01 RX ADMIN — LABETALOL HYDROCHLORIDE 10 MG: 5 INJECTION, SOLUTION INTRAVENOUS at 22:44

## 2017-01-01 RX ADMIN — ACETAMINOPHEN 650 MG: 325 TABLET, FILM COATED ORAL at 08:37

## 2017-01-01 RX ADMIN — CEFTRIAXONE SODIUM 1 G: 1 INJECTION, POWDER, FOR SOLUTION INTRAMUSCULAR; INTRAVENOUS at 14:15

## 2017-01-01 RX ADMIN — CLONAZEPAM 0.25 MG: 0.5 TABLET ORAL at 21:14

## 2017-01-01 RX ADMIN — IPRATROPIUM BROMIDE AND ALBUTEROL SULFATE 3 ML: .5; 3 SOLUTION RESPIRATORY (INHALATION) at 06:19

## 2017-01-01 RX ADMIN — ACETAMINOPHEN 650 MG: 325 TABLET, FILM COATED ORAL at 23:59

## 2017-01-01 RX ADMIN — MORPHINE SULFATE 1 MG: 4 INJECTION INTRAVENOUS at 18:53

## 2017-01-01 RX ADMIN — IPRATROPIUM BROMIDE AND ALBUTEROL SULFATE 3 ML: .5; 3 SOLUTION RESPIRATORY (INHALATION) at 23:15

## 2017-01-01 RX ADMIN — GUAIFENESIN 200 MG: 100 SOLUTION ORAL at 22:13

## 2017-01-01 RX ADMIN — BUSPIRONE HYDROCHLORIDE 5 MG: 10 TABLET ORAL at 10:30

## 2017-01-01 RX ADMIN — LISINOPRIL 40 MG: 20 TABLET ORAL at 08:36

## 2017-01-01 RX ADMIN — IPRATROPIUM BROMIDE AND ALBUTEROL SULFATE 3 ML: .5; 3 SOLUTION RESPIRATORY (INHALATION) at 14:44

## 2017-01-01 RX ADMIN — LISINOPRIL 20 MG: 20 TABLET ORAL at 10:37

## 2017-01-01 RX ADMIN — METOPROLOL SUCCINATE 50 MG: 50 TABLET, EXTENDED RELEASE ORAL at 10:33

## 2017-01-01 RX ADMIN — MAGNESIUM SULFATE IN WATER 2 G: 40 INJECTION, SOLUTION INTRAVENOUS at 15:56

## 2017-01-01 RX ADMIN — IPRATROPIUM BROMIDE AND ALBUTEROL SULFATE 3 ML: .5; 3 SOLUTION RESPIRATORY (INHALATION) at 19:50

## 2017-01-01 RX ADMIN — BUSPIRONE HYDROCHLORIDE 5 MG: 10 TABLET ORAL at 08:21

## 2017-01-01 RX ADMIN — FAMOTIDINE 20 MG: 20 TABLET, FILM COATED ORAL at 08:24

## 2017-01-01 RX ADMIN — CLONAZEPAM 0.25 MG: 0.5 TABLET ORAL at 20:40

## 2017-01-01 RX ADMIN — SODIUM CHLORIDE: 9 INJECTION, SOLUTION INTRAVENOUS at 20:41

## 2017-01-01 RX ADMIN — IPRATROPIUM BROMIDE AND ALBUTEROL SULFATE 3 ML: .5; 3 SOLUTION RESPIRATORY (INHALATION) at 07:27

## 2017-01-01 RX ADMIN — AMLODIPINE BESYLATE 5 MG: 5 TABLET ORAL at 09:39

## 2017-01-01 RX ADMIN — ENOXAPARIN SODIUM 40 MG: 100 INJECTION SUBCUTANEOUS at 08:19

## 2017-01-01 RX ADMIN — IPRATROPIUM BROMIDE AND ALBUTEROL SULFATE 3 ML: .5; 3 SOLUTION RESPIRATORY (INHALATION) at 14:58

## 2017-01-01 RX ADMIN — LISINOPRIL 20 MG: 20 TABLET ORAL at 22:28

## 2017-01-01 RX ADMIN — ASPIRIN 325 MG: 325 TABLET, DELAYED RELEASE ORAL at 09:42

## 2017-01-01 RX ADMIN — ENOXAPARIN SODIUM 40 MG: 100 INJECTION SUBCUTANEOUS at 09:37

## 2017-01-01 RX ADMIN — TIOTROPIUM BROMIDE 1 CAPSULE: 18 CAPSULE ORAL; RESPIRATORY (INHALATION) at 06:45

## 2017-01-01 RX ADMIN — IPRATROPIUM BROMIDE AND ALBUTEROL SULFATE 3 ML: .5; 3 SOLUTION RESPIRATORY (INHALATION) at 10:03

## 2017-01-01 RX ADMIN — METHYLPREDNISOLONE SODIUM SUCCINATE 62.5 MG: 125 INJECTION, POWDER, FOR SOLUTION INTRAMUSCULAR; INTRAVENOUS at 21:32

## 2017-01-01 RX ADMIN — BUSPIRONE HYDROCHLORIDE 5 MG: 10 TABLET ORAL at 10:03

## 2017-01-01 RX ADMIN — IPRATROPIUM BROMIDE AND ALBUTEROL SULFATE 3 ML: .5; 3 SOLUTION RESPIRATORY (INHALATION) at 09:36

## 2017-01-01 RX ADMIN — LISINOPRIL 20 MG: 20 TABLET ORAL at 08:30

## 2017-01-01 RX ADMIN — SODIUM CHLORIDE: 9 INJECTION, SOLUTION INTRAVENOUS at 00:00

## 2017-01-01 RX ADMIN — HYDRALAZINE HYDROCHLORIDE 10 MG: 20 INJECTION INTRAMUSCULAR; INTRAVENOUS at 20:44

## 2017-01-01 RX ADMIN — CLONAZEPAM 0.25 MG: 0.5 TABLET ORAL at 21:03

## 2017-01-01 RX ADMIN — CLONAZEPAM 0.5 MG: 0.5 TABLET ORAL at 15:50

## 2017-01-01 RX ADMIN — ALPRAZOLAM 0.25 MG: 0.25 TABLET ORAL at 18:32

## 2017-01-01 RX ADMIN — ENOXAPARIN SODIUM 40 MG: 100 INJECTION SUBCUTANEOUS at 21:04

## 2017-01-01 RX ADMIN — BUSPIRONE HYDROCHLORIDE 5 MG: 10 TABLET ORAL at 10:02

## 2017-01-01 RX ADMIN — ENOXAPARIN SODIUM 40 MG: 100 INJECTION SUBCUTANEOUS at 20:58

## 2017-01-01 RX ADMIN — ENOXAPARIN SODIUM 40 MG: 100 INJECTION SUBCUTANEOUS at 10:35

## 2017-01-01 RX ADMIN — SODIUM CHLORIDE: 9 INJECTION, SOLUTION INTRAVENOUS at 12:31

## 2017-01-01 RX ADMIN — CEFDINIR 300 MG: 300 CAPSULE ORAL at 21:22

## 2017-01-01 RX ADMIN — SODIUM CHLORIDE: 9 INJECTION, SOLUTION INTRAVENOUS at 16:33

## 2017-01-01 RX ADMIN — DOXYCYCLINE 100 MG: 100 TABLET ORAL at 20:39

## 2017-01-01 RX ADMIN — IPRATROPIUM BROMIDE AND ALBUTEROL SULFATE 3 ML: .5; 3 SOLUTION RESPIRATORY (INHALATION) at 22:46

## 2017-01-01 RX ADMIN — IPRATROPIUM BROMIDE AND ALBUTEROL SULFATE 3 ML: .5; 3 SOLUTION RESPIRATORY (INHALATION) at 19:01

## 2017-01-01 RX ADMIN — FAMOTIDINE 20 MG: 20 TABLET, FILM COATED ORAL at 21:00

## 2017-01-01 RX ADMIN — AMLODIPINE BESYLATE 10 MG: 10 TABLET ORAL at 08:33

## 2017-01-01 RX ADMIN — LORAZEPAM 1 MG: 2 INJECTION INTRAMUSCULAR; INTRAVENOUS at 13:32

## 2017-01-01 RX ADMIN — SIMVASTATIN 20 MG: 40 TABLET, FILM COATED ORAL at 20:41

## 2017-01-01 RX ADMIN — ACETAMINOPHEN 650 MG: 325 TABLET, FILM COATED ORAL at 00:21

## 2017-01-01 RX ADMIN — IPRATROPIUM BROMIDE AND ALBUTEROL SULFATE 3 ML: .5; 3 SOLUTION RESPIRATORY (INHALATION) at 07:30

## 2017-01-01 RX ADMIN — DOXYCYCLINE 100 MG: 100 TABLET ORAL at 08:38

## 2017-01-01 RX ADMIN — LORAZEPAM 0.5 MG: 2 INJECTION INTRAMUSCULAR; INTRAVENOUS at 10:37

## 2017-01-01 RX ADMIN — NICOTINE 21 MG: 21 PATCH, EXTENDED RELEASE TRANSDERMAL at 05:28

## 2017-01-01 RX ADMIN — HYDRALAZINE HYDROCHLORIDE 10 MG: 20 INJECTION INTRAMUSCULAR; INTRAVENOUS at 18:56

## 2017-01-01 RX ADMIN — CEFDINIR 300 MG: 300 CAPSULE ORAL at 20:47

## 2017-01-01 RX ADMIN — IPRATROPIUM BROMIDE AND ALBUTEROL SULFATE 3 ML: .5; 3 SOLUTION RESPIRATORY (INHALATION) at 06:31

## 2017-01-01 RX ADMIN — NICOTINE 21 MG: 21 PATCH, EXTENDED RELEASE TRANSDERMAL at 05:46

## 2017-01-01 RX ADMIN — NICOTINE 21 MG: 21 PATCH, EXTENDED RELEASE TRANSDERMAL at 05:22

## 2017-01-01 RX ADMIN — BUDESONIDE AND FORMOTEROL FUMARATE DIHYDRATE 2 PUFF: 160; 4.5 AEROSOL RESPIRATORY (INHALATION) at 19:38

## 2017-01-01 RX ADMIN — Medication 50 MG: at 13:44

## 2017-01-01 RX ADMIN — METHYLPREDNISOLONE SODIUM SUCCINATE 40 MG: 40 INJECTION, POWDER, FOR SOLUTION INTRAMUSCULAR; INTRAVENOUS at 08:28

## 2017-01-01 RX ADMIN — DOXYCYCLINE 100 MG: 100 TABLET ORAL at 21:20

## 2017-01-01 RX ADMIN — CEFTRIAXONE SODIUM 1 G: 1 INJECTION, POWDER, FOR SOLUTION INTRAMUSCULAR; INTRAVENOUS at 10:35

## 2017-01-01 RX ADMIN — ASPIRIN 81 MG: 81 TABLET, COATED ORAL at 22:28

## 2017-01-01 RX ADMIN — HEPARIN SODIUM 5000 UNITS: 5000 INJECTION, SOLUTION INTRAVENOUS; SUBCUTANEOUS at 06:21

## 2017-01-01 RX ADMIN — IPRATROPIUM BROMIDE AND ALBUTEROL SULFATE 3 ML: .5; 3 SOLUTION RESPIRATORY (INHALATION) at 20:18

## 2017-01-01 RX ADMIN — SODIUM CHLORIDE: 9 INJECTION, SOLUTION INTRAVENOUS at 01:04

## 2017-01-01 RX ADMIN — CEFDINIR 300 MG: 300 CAPSULE ORAL at 08:23

## 2017-01-01 RX ADMIN — DOXYCYCLINE 100 MG: 100 TABLET ORAL at 10:33

## 2017-01-01 RX ADMIN — HALOPERIDOL LACTATE 2.5 MG: 5 INJECTION, SOLUTION INTRAMUSCULAR at 20:02

## 2017-01-01 RX ADMIN — IPRATROPIUM BROMIDE AND ALBUTEROL SULFATE 3 ML: .5; 3 SOLUTION RESPIRATORY (INHALATION) at 06:43

## 2017-01-01 RX ADMIN — ENOXAPARIN SODIUM 40 MG: 100 INJECTION SUBCUTANEOUS at 21:14

## 2017-01-01 RX ADMIN — PREDNISONE 40 MG: 20 TABLET ORAL at 08:24

## 2017-01-01 RX ADMIN — BUSPIRONE HYDROCHLORIDE 5 MG: 10 TABLET ORAL at 09:38

## 2017-01-01 RX ADMIN — ASPIRIN 81 MG: 81 TABLET ORAL at 10:03

## 2017-01-01 RX ADMIN — CEFDINIR 300 MG: 300 CAPSULE ORAL at 20:43

## 2017-01-01 RX ADMIN — MORPHINE SULFATE 1 MG: 4 INJECTION INTRAVENOUS at 20:59

## 2017-01-01 RX ADMIN — LABETALOL HYDROCHLORIDE 10 MG: 5 INJECTION, SOLUTION INTRAVENOUS at 18:08

## 2017-01-01 RX ADMIN — ASPIRIN 81 MG: 81 TABLET, COATED ORAL at 08:20

## 2017-01-01 RX ADMIN — TIOTROPIUM BROMIDE 1 CAPSULE: 18 CAPSULE ORAL; RESPIRATORY (INHALATION) at 09:55

## 2017-01-01 RX ADMIN — ASPIRIN 81 MG: 81 TABLET, COATED ORAL at 08:38

## 2017-01-01 RX ADMIN — CLONAZEPAM 0.25 MG: 0.5 TABLET ORAL at 20:59

## 2017-01-01 RX ADMIN — LISINOPRIL 20 MG: 20 TABLET ORAL at 09:43

## 2017-01-01 RX ADMIN — IPRATROPIUM BROMIDE AND ALBUTEROL SULFATE 3 ML: .5; 3 SOLUTION RESPIRATORY (INHALATION) at 14:42

## 2017-01-01 RX ADMIN — MORPHINE SULFATE 1 MG: 4 INJECTION INTRAVENOUS at 05:59

## 2017-01-01 RX ADMIN — DOXYCYCLINE 100 MG: 100 TABLET ORAL at 08:23

## 2017-01-01 ASSESSMENT — ENCOUNTER SYMPTOMS
DOUBLE VISION: 0
BLURRED VISION: 0
COUGH: 1
FEVER: 0
NECK PAIN: 0
SPEECH CHANGE: 0
HEARTBURN: 0
VOMITING: 0
CHILLS: 0
WEAKNESS: 1
DIZZINESS: 0
PHOTOPHOBIA: 0
DIZZINESS: 0
DIAPHORESIS: 0
HALLUCINATIONS: 0
NERVOUS/ANXIOUS: 1
FEVER: 0
SPEECH CHANGE: 0
DIARRHEA: 0
FEVER: 0
HEADACHES: 0
BACK PAIN: 0
DOUBLE VISION: 0
TREMORS: 0
TINGLING: 0
MYALGIAS: 0
PALPITATIONS: 0
FEVER: 0
ABDOMINAL PAIN: 0
ABDOMINAL PAIN: 0
HALLUCINATIONS: 0
MEMORY LOSS: 0
PND: 0
MYALGIAS: 0
CHILLS: 0
FOCAL WEAKNESS: 0
EYE PAIN: 0
NERVOUS/ANXIOUS: 1
DEPRESSION: 0
CHILLS: 0
TINGLING: 0
FEVER: 0
SHORTNESS OF BREATH: 1
FEVER: 0
PALPITATIONS: 0
DIZZINESS: 0
SPEECH CHANGE: 0
NECK PAIN: 1
CONSTIPATION: 0
HEADACHES: 0
BACK PAIN: 0
FOCAL WEAKNESS: 0
CLAUDICATION: 0
COUGH: 1
NAUSEA: 1
FEVER: 0
DEPRESSION: 0
TREMORS: 0
SPUTUM PRODUCTION: 1
BACK PAIN: 0
PND: 0
TINGLING: 0
NECK PAIN: 0
HEADACHES: 0
NAUSEA: 0
HEADACHES: 0
COUGH: 1
COUGH: 1
SENSORY CHANGE: 0
NAUSEA: 0
BACK PAIN: 0
SHORTNESS OF BREATH: 1
BLOOD IN STOOL: 0
SHORTNESS OF BREATH: 1
NECK PAIN: 0
NAUSEA: 1
COUGH: 1
SENSORY CHANGE: 0
MYALGIAS: 0
TREMORS: 0
WEAKNESS: 1
NAUSEA: 0
BACK PAIN: 0
BACK PAIN: 0
SORE THROAT: 0
NERVOUS/ANXIOUS: 1
COUGH: 1
VOMITING: 1
SHORTNESS OF BREATH: 1
NAUSEA: 0
VOMITING: 0
ORTHOPNEA: 0
NECK PAIN: 0
PND: 0
PND: 0
FEVER: 0
CHILLS: 0
STRIDOR: 0
SPEECH CHANGE: 0
PHOTOPHOBIA: 0
DEPRESSION: 0
WHEEZING: 1
PND: 0
CHILLS: 0
VOMITING: 0
CLAUDICATION: 0
CLAUDICATION: 0
WEAKNESS: 1
SPEECH CHANGE: 0
BLURRED VISION: 0
HEADACHES: 0
PALPITATIONS: 0
VOMITING: 0
SHORTNESS OF BREATH: 1
TREMORS: 0
VOMITING: 0
VOMITING: 0
SENSORY CHANGE: 0
ORTHOPNEA: 0
BACK PAIN: 0
PND: 0
HEARTBURN: 0
NERVOUS/ANXIOUS: 1
TREMORS: 0
SENSORY CHANGE: 0
BLOOD IN STOOL: 0
PHOTOPHOBIA: 0
VOMITING: 0
HEADACHES: 0
STRIDOR: 0
SPUTUM PRODUCTION: 1
ORTHOPNEA: 0
DIZZINESS: 0
STRIDOR: 0
WEAKNESS: 1
TINGLING: 0
DIARRHEA: 0
COUGH: 1
SPEECH CHANGE: 0
BACK PAIN: 0
PALPITATIONS: 0
COUGH: 1
BLURRED VISION: 0
SPUTUM PRODUCTION: 0
MYALGIAS: 0
NERVOUS/ANXIOUS: 0
SPEECH CHANGE: 0
DEPRESSION: 0
CHILLS: 0
MYALGIAS: 0
NERVOUS/ANXIOUS: 1
STRIDOR: 0
BLOOD IN STOOL: 0
DEPRESSION: 0
EYE DISCHARGE: 0
DIZZINESS: 0
SHORTNESS OF BREATH: 1
SHORTNESS OF BREATH: 1
NAUSEA: 0
DEPRESSION: 0
SHORTNESS OF BREATH: 1
NECK PAIN: 0
NERVOUS/ANXIOUS: 1
MYALGIAS: 0
BACK PAIN: 0
MEMORY LOSS: 0
HEADACHES: 0
HEMOPTYSIS: 0
SPUTUM PRODUCTION: 0
WEAKNESS: 1
VOMITING: 0
SENSORY CHANGE: 0
SHORTNESS OF BREATH: 1
CHILLS: 0
ABDOMINAL PAIN: 0
HEADACHES: 0
MEMORY LOSS: 0
PALPITATIONS: 0
CHILLS: 0
WEAKNESS: 1
EYE PAIN: 0
WHEEZING: 1
SHORTNESS OF BREATH: 1
TREMORS: 0
SPUTUM PRODUCTION: 0
COUGH: 1
CHILLS: 0
FEVER: 0
WEIGHT LOSS: 1
BACK PAIN: 0
NAUSEA: 0
DIZZINESS: 0
NERVOUS/ANXIOUS: 1
FLANK PAIN: 0
PALPITATIONS: 0
EYE PAIN: 0
FALLS: 0
FEVER: 0
DIARRHEA: 0
VOMITING: 0
NAUSEA: 0
SHORTNESS OF BREATH: 1
PALPITATIONS: 0
NAUSEA: 0
MYALGIAS: 0
SENSORY CHANGE: 0
PALPITATIONS: 0
ORTHOPNEA: 0
FEVER: 0
DEPRESSION: 0
FLANK PAIN: 0
SORE THROAT: 0
DIZZINESS: 0
CONSTIPATION: 0
CONSTIPATION: 0
MYALGIAS: 0
MEMORY LOSS: 0
NECK PAIN: 0
WEAKNESS: 1
SORE THROAT: 0
COUGH: 1
TINGLING: 0
WEAKNESS: 1
WEAKNESS: 1
CONSTIPATION: 0
MYALGIAS: 0
WEAKNESS: 1
EYE REDNESS: 0
HEADACHES: 0
SENSORY CHANGE: 0
SPUTUM PRODUCTION: 0
HEARTBURN: 0
NAUSEA: 0
BLURRED VISION: 0
CHILLS: 0
SORE THROAT: 0
CONSTIPATION: 0
DIZZINESS: 0
TREMORS: 0
NERVOUS/ANXIOUS: 1
DEPRESSION: 0
COUGH: 1
CHILLS: 0
DOUBLE VISION: 0
VOMITING: 0

## 2017-01-01 ASSESSMENT — COPD QUESTIONNAIRES
COPD SCREENING SCORE: 8
HAVE YOU SMOKED AT LEAST 100 CIGARETTES IN YOUR ENTIRE LIFE: YES
HAVE YOU SMOKED AT LEAST 100 CIGARETTES IN YOUR ENTIRE LIFE: YES
COPD SCREENING SCORE: 5
DURING THE PAST 4 WEEKS HOW MUCH DID YOU FEEL SHORT OF BREATH: MOST  OR ALL OF THE TIME
COPD SCREENING SCORE: 8
DO YOU EVER COUGH UP ANY MUCUS OR PHLEGM?: YES, A FEW DAYS A WEEK OR MONTH
HAVE YOU SMOKED AT LEAST 100 CIGARETTES IN YOUR ENTIRE LIFE: YES
DURING THE PAST 4 WEEKS HOW MUCH DID YOU FEEL SHORT OF BREATH: NONE/LITTLE OF THE TIME
DO YOU EVER COUGH UP ANY MUCUS OR PHLEGM?: YES, A FEW DAYS A WEEK OR MONTH
DURING THE PAST 4 WEEKS HOW MUCH DID YOU FEEL SHORT OF BREATH: MOST  OR ALL OF THE TIME
COPD SCREENING SCORE: 7
DO YOU EVER COUGH UP ANY MUCUS OR PHLEGM?: YES, A FEW DAYS A WEEK OR MONTH
HAVE YOU SMOKED AT LEAST 100 CIGARETTES IN YOUR ENTIRE LIFE: YES
COPD SCREENING SCORE: 8
DURING THE PAST 4 WEEKS HOW MUCH DID YOU FEEL SHORT OF BREATH: SOME OF THE TIME
HAVE YOU SMOKED AT LEAST 100 CIGARETTES IN YOUR ENTIRE LIFE: YES
DO YOU EVER COUGH UP ANY MUCUS OR PHLEGM?: YES, A FEW DAYS A WEEK OR MONTH
DURING THE PAST 4 WEEKS HOW MUCH DID YOU FEEL SHORT OF BREATH: MOST  OR ALL OF THE TIME
DO YOU EVER COUGH UP ANY MUCUS OR PHLEGM?: NO/ONLY WITH OCCASIONAL COLDS OR INFECTIONS

## 2017-01-01 ASSESSMENT — PULMONARY FUNCTION TESTS
FEV1: .67
FEV1/FVC_PERCENT_PREDICTED: 47
FVC_PREDICTED: 2.98
FEV1_PERCENT_CHANGE: 4
FEV1_PREDICTED: 2.24
FVC: 1.64
FEV1/FVC_PERCENT_PREDICTED: 57
FEV1: .6
FEV1/FVC: 42
FEV1_PERCENT_PREDICTED: 30
FEV1_PERCENT_PREDICTED: 26
FEV1_PERCENT_CHANGE: -11
FVC_PERCENT_PREDICTED: 53
FVC_PERCENT_PREDICTED: 55
FVC: 1.58
FEV1/FVC: 36.59
FEV1/FVC_PERCENT_PREDICTED: 75
FEV1/FVC_PERCENT_CHANGE: -275

## 2017-01-01 ASSESSMENT — COGNITIVE AND FUNCTIONAL STATUS - GENERAL
SUGGESTED CMS G CODE MODIFIER DAILY ACTIVITY: CJ
SUGGESTED CMS G CODE MODIFIER MOBILITY: CM
SUGGESTED CMS G CODE MODIFIER MOBILITY: CK
CLIMB 3 TO 5 STEPS WITH RAILING: A LITTLE
STANDING UP FROM CHAIR USING ARMS: TOTAL
CLIMB 3 TO 5 STEPS WITH RAILING: A LITTLE
TURNING FROM BACK TO SIDE WHILE IN FLAT BAD: A LITTLE
TOILETING: A LITTLE
DAILY ACTIVITIY SCORE: 20
MOVING TO AND FROM BED TO CHAIR: A LITTLE
CLIMB 3 TO 5 STEPS WITH RAILING: TOTAL
WALKING IN HOSPITAL ROOM: A LITTLE
SUGGESTED CMS G CODE MODIFIER DAILY ACTIVITY: CJ
PERSONAL GROOMING: A LITTLE
MOBILITY SCORE: 9
DRESSING REGULAR LOWER BODY CLOTHING: A LITTLE
DRESSING REGULAR LOWER BODY CLOTHING: A LITTLE
PERSONAL GROOMING: A LITTLE
TOILETING: A LITTLE
WALKING IN HOSPITAL ROOM: TOTAL
DAILY ACTIVITIY SCORE: 21
HELP NEEDED FOR BATHING: A LITTLE
MOBILITY SCORE: 18
MOBILITY SCORE: 23
DAILY ACTIVITIY SCORE: 20
HELP NEEDED FOR BATHING: A LITTLE
MOVING FROM LYING ON BACK TO SITTING ON SIDE OF FLAT BED: A LOT
SUGGESTED CMS G CODE MODIFIER DAILY ACTIVITY: CJ
DRESSING REGULAR LOWER BODY CLOTHING: A LITTLE
MOVING TO AND FROM BED TO CHAIR: A LOT
SUGGESTED CMS G CODE MODIFIER MOBILITY: CI
TOILETING: A LITTLE
HELP NEEDED FOR BATHING: A LITTLE
MOVING FROM LYING ON BACK TO SITTING ON SIDE OF FLAT BED: A LITTLE
TURNING FROM BACK TO SIDE WHILE IN FLAT BAD: A LOT
STANDING UP FROM CHAIR USING ARMS: A LITTLE

## 2017-01-01 ASSESSMENT — GAIT ASSESSMENTS
DISTANCE (FEET): 100
GAIT LEVEL OF ASSIST: SUPERVISED
ASSISTIVE DEVICE: FRONT WHEEL WALKER
DEVIATION: BRADYKINETIC
GAIT LEVEL OF ASSIST: STAND BY ASSIST
DISTANCE (FEET): 40
ASSISTIVE DEVICE: FRONT WHEEL WALKER

## 2017-01-01 ASSESSMENT — PAIN SCALES - GENERAL
PAINLEVEL_OUTOF10: 5
PAINLEVEL: NO PAIN
PAINLEVEL_OUTOF10: 0
PAINLEVEL_OUTOF10: 3
PAINLEVEL_OUTOF10: 0
PAINLEVEL_OUTOF10: 5
PAINLEVEL_OUTOF10: 0
PAINLEVEL_OUTOF10: 6
PAINLEVEL_OUTOF10: 0
PAINLEVEL_OUTOF10: 5
PAINLEVEL_OUTOF10: 1
PAINLEVEL_OUTOF10: 0
PAINLEVEL_OUTOF10: 4
PAINLEVEL: NO PAIN
PAINLEVEL_OUTOF10: 0
PAINLEVEL_OUTOF10: 4
PAINLEVEL_OUTOF10: 0
PAINLEVEL_OUTOF10: 0

## 2017-01-01 ASSESSMENT — PATIENT HEALTH QUESTIONNAIRE - PHQ9
2. FEELING DOWN, DEPRESSED, IRRITABLE, OR HOPELESS: NOT AT ALL
SUM OF ALL RESPONSES TO PHQ9 QUESTIONS 1 AND 2: 1
5. POOR APPETITE OR OVEREATING: SEVERAL DAYS
4. FEELING TIRED OR HAVING LITTLE ENERGY: SEVERAL DAYS
1. LITTLE INTEREST OR PLEASURE IN DOING THINGS: SEVERAL DAYS
1. LITTLE INTEREST OR PLEASURE IN DOING THINGS: NOT AT ALL
CLINICAL INTERPRETATION OF PHQ2 SCORE: 0
7. TROUBLE CONCENTRATING ON THINGS, SUCH AS READING THE NEWSPAPER OR WATCHING TELEVISION: NOT AT ALL
9. THOUGHTS THAT YOU WOULD BE BETTER OFF DEAD, OR OF HURTING YOURSELF: NOT AT ALL
SUM OF ALL RESPONSES TO PHQ9 QUESTIONS 1 AND 2: 0
SUM OF ALL RESPONSES TO PHQ QUESTIONS 1-9: 0
SUM OF ALL RESPONSES TO PHQ9 QUESTIONS 1 AND 2: 0
1. LITTLE INTEREST OR PLEASURE IN DOING THINGS: NOT AT ALL
6. FEELING BAD ABOUT YOURSELF - OR THAT YOU ARE A FAILURE OR HAVE LET YOURSELF OR YOUR FAMILY DOWN: NOT AL ALL
SUM OF ALL RESPONSES TO PHQ QUESTIONS 1-9: 0
SUM OF ALL RESPONSES TO PHQ QUESTIONS 1-9: 3
8. MOVING OR SPEAKING SO SLOWLY THAT OTHER PEOPLE COULD HAVE NOTICED. OR THE OPPOSITE, BEING SO FIGETY OR RESTLESS THAT YOU HAVE BEEN MOVING AROUND A LOT MORE THAN USUAL: NOT AT ALL
3. TROUBLE FALLING OR STAYING ASLEEP OR SLEEPING TOO MUCH: NOT AT ALL

## 2017-01-01 ASSESSMENT — ACTIVITIES OF DAILY LIVING (ADL)
TOILETING: INDEPENDENT
TOILETING: INDEPENDENT

## 2017-01-01 ASSESSMENT — LIFESTYLE VARIABLES
ALCOHOL_USE: NO
DO YOU DRINK ALCOHOL: NO
DO YOU DRINK ALCOHOL: NO
EVER_SMOKED: YES
EVER_SMOKED: YES
DO YOU DRINK ALCOHOL: NO
EVER_SMOKED: YES
ALCOHOL_USE: NO

## 2017-01-25 NOTE — PROGRESS NOTES
01/25/17 SCHEDULED AWV AND VACCINE     Health Maintenance Due   Topic Date Due   • PFT SCREENING-FEV1 AND FEV/FVC RATIO / SPIROMETRY SHOULD BE PERFORMED ANNUALLY  02/11/1958   • IMM DTaP/Tdap/Td Vaccine (1 - Tdap) 02/11/1959   • Annual Wellness Visit  04/28/2016

## 2017-01-31 NOTE — MR AVS SNAPSHOT
"        Yoko Mistry   2017 10:00 AM   Non-Provider Visit   MRN: [id number]    Department:  [facility]   Dept Phone:  [phone]    Description:  Female : 1940   Provider:  Matilde Yi M.D.           Reason for Visit     COPD           Allergies as of 2017     Allergen Noted Reactions    Celexa [Citalopram Hydrobromide] 2014       Low sodium      You were diagnosed with     Dyspnea, unspecified type   [9537555]         Vital Signs     Height Weight Body Mass Index Smoking Status          1.676 m (5' 6\") 49.896 kg (110 lb) 17.76 kg/m2 Current Every Day Smoker        Basic Information     Date Of Birth Sex Race Ethnicity Preferred Language    1940 Female White Non- English      Your appointments     2017  9:20 AM   Established Patient with Mireille Benito M.D.   [facility] (--)    [address]  [address]  [place] [id number] [phone]           You will be receiving a confirmation call a few days before your appointment from our automated call confirmation system.            Mar 02, 2017 10:00 AM   ANNUAL WELLNESS with Mireille Benito M.D., [facility]    [facility] (--)    [address]  [address]  [place] [id number] [phone]              Problem List              ICD-10-CM Priority Class Noted - Resolved    Hyperlipidemia (Chronic) E78.5   2011 - Present    HTN (hypertension) (Chronic) I10   2011 - Present    COPD (chronic obstructive pulmonary disease) (HCC) (Chronic) J44.9   2011 - Present    Anxiety disorder (Chronic) F41.9   2011 - Present    Current smoker (Chronic) F17.200   3/13/2012 - Present    Osteoporosis, post-menopausal M81.0   2013 - Present    Chronic kidney disease (CKD), stage III (moderate) (Chronic) N18.3   2014 - Present    Peripheral vascular disease (CMS-HCC) (Chronic) I73.9 High  2015 - Present    Chronic respiratory failure (CMS-HCC) J96.10   2015 " - Present    Microalbuminuria R80.9   4/28/2015 - Present    Intractable vomiting with nausea R11.2 High  11/11/2016 - Present    Hypokalemia E87.6   11/11/2016 - Present    Essential hypertension (Chronic) I10   11/15/2016 - Present      Health Maintenance        Date Due Completion Dates    IMM DTaP/Tdap/Td Vaccine (1 - Tdap) 2/11/1959 ---    MAMMOGRAM 2/10/2017 2/10/2016, 11/4/2013, 10/29/2013, 10/29/2013    COLONOSCOPY 10/15/2019 10/15/2009 (N/S)    Override on 10/15/2009: (N/S)    BONE DENSITY 11/29/2021 11/29/2016, 11/4/2013            Current Immunizations     13-VALENT PCV PREVNAR 4/28/2015 12:15 PM    Influenza TIV (IM) 10/13/2014, 9/29/2012, 10/5/2011 11:20 AM    Influenza Vaccine Adult HD 8/25/2016, 9/25/2015    Pneumococcal polysaccharide vaccine (PPSV-23) 10/5/2011 11:15 AM    SHINGLES VACCINE 4/28/2015 12:15 PM      Below and/or attached are the medications your provider expects you to take. Review all of your home medications and newly ordered medications with your provider and/or pharmacist. Follow medication instructions as directed by your provider and/or pharmacist. Please keep your medication list with you and share with your provider. Update the information when medications are discontinued, doses are changed, or new medications (including over-the-counter products) are added; and carry medication information at all times in the event of emergency situations     Allergies:  CELEXA - (reactions not documented)               Medications  Valid as of: January 31, 2017 - 11:08 AM    Generic Name Brand Name Tablet Size Instructions for use    Albuterol Sulfate (Aero Soln) albuterol 108 (90 BASE) MCG/ACT Inhale 2 Puffs by mouth every 6 hours as needed for Shortness of Breath.        Albuterol Sulfate (Aero Soln) albuterol 108 (90 BASE) MCG/ACT Inhale 2 Puffs by mouth every 6 hours as needed for Shortness of Breath.        Alendronate Sodium (Tab) FOSAMAX 70 MG Take 1 Tab by mouth every Saturday.           Aspirin (Tablet Delayed Response) ECOTRIN 81 MG Take 81 mg by mouth every day.        BusPIRone HCl (Tab) BUSPAR 5 MG Take 5 mg by mouth every day.        Cholecalciferol (Tab) cholecalciferol 1000 UNIT Take 1,000 Units by mouth every day.        ClonazePAM (Tab) KLONOPIN 0.5 MG TAKE 1 TABLET BY MOUTH 2 TIMES A DAY        Garlic   Take 1 tablet by mouth every day.        Ipratropium-Albuterol (Solution) DUONEB 0.5-2.5 (3) MG/3ML 3 mL by Nebulization route 4 times a day.        Lisinopril (Tab) PRINIVIL 20 MG Take 20 mg by mouth every day.        Metoprolol Succinate (TABLET SR 24 HR) TOPROL XL 50 MG Take 50 mg by mouth every day.        Ondansetron (TABLET DISPERSIBLE) ZOFRAN ODT 4 MG Take 1 Tab by mouth every 8 hours as needed for Nausea/Vomiting.        Simvastatin (Tab) ZOCOR 20 MG Take 20 mg by mouth every evening.        .                 Medicines prescribed today were sent to:     [facility]/PHARMACY #[id number] [facility], [place] - [address]    [address] [place] [address]    Phone: [phone] Fax: [phone]    Open 24 Hours?: No    [facility]    [address] [facility] [address] [facility] [address]    Phone: [phone] Fax: [phone]      Medication refill instructions:       If your prescription bottle indicates you have medication refills left, it is not necessary to call your provider’s office. Please contact your pharmacy and they will refill your medication.    If your prescription bottle indicates you do not have any refills left, you may request refills at any time through one of the following ways: The online [employer] system (except Urgent Care), by calling your provider’s office, or by asking your pharmacy to contact your provider’s office with a refill request. Medication refills are processed only during regular business hours and may not be available until the next business day. Your provider may request additional information or to have a follow-up visit with you prior to refilling your medication.    *Please Note: Medication refills are assigned a new Rx number when refilled electronically. Your pharmacy may indicate that no refills were authorized even though a new prescription for the same medication is available at the pharmacy. Please request the medicine by name with the pharmacy before contacting your provider for a refill.           [email or web address] Access Code: Activation code not generated  Current "[employer]" Status: Active

## 2017-01-31 NOTE — PROCEDURES
Technician: TI Mckay    Technician Comment:  Good patient effort & cooperation.  The results of this test meet the ATS/ERS standards for acceptability & reproducibility.  Test was performed on the [employer] Body Plethysmograph-Elite DX system.  Predicted values were [facility]-3 for spirometry, [facility] for DLCO, ITS for Lung Volumes.  The DLCO was uncorrected for Hgb.  A bronchodilator of Ventolin HFA -2puffs via spacer administered.      The FVC is 1.58 L or 53%, FEV1 is 0.67 L or 30%, FEV1/FVC 43%. No bronchodilator response. Increase lung volumes. DLCO 45%.    Interpretation:  PFTs are consistent with stage IV COPD.

## 2017-01-31 NOTE — MR AVS SNAPSHOT
"Yoko Mistry   2017 11:00 AM   Office Visit   MRN: [id number]    Department:  [facility]   Dept Phone:  [phone]    Description:  Female : 1940   Provider:  Matilde Yi M.D.           Reason for Visit     Establish Care Referred by  for COPD,PFT results.      Allergies as of 2017     Allergen Noted Reactions    Celexa [Citalopram Hydrobromide] 2014       Low sodium      You were diagnosed with     Chronic obstructive pulmonary disease, unspecified COPD type (CMS-HCC)   [9748744]       Chronic respiratory failure with hypoxia (CMS-HCC)   [309896]       Current smoker   [[id number]]         Vital Signs     Blood Pressure Pulse Temperature Respirations Height Weight    124/72 mmHg 69 36.3 °C (97.3 °F) 14 1.676 m (5' 5.98\") 49.896 kg (110 lb)    Body Mass Index Oxygen Saturation Smoking Status             17.76 kg/m2 96% Current Every Day Smoker         Basic Information     Date Of Birth Sex Race Ethnicity Preferred Language    1940 Female White Non- English      Your appointments     2017  9:20 AM   Established Patient with Mireille Benito M.D.   [facility] [place] (--)    [address]  [address]  [place] [id number] [phone]           You will be receiving a confirmation call a few days before your appointment from our automated call confirmation system.            Mar 01, 2017 11:00 AM   Established Patient Pul with RICK Romeo   [facility] (--)    [address] [facility] [address]  [place] [id number] [phone]            Mar 02, 2017 10:00 AM   ANNUAL WELLNESS with Mireille Benito M.D., [facility]    [facility] (--)    [address]  [place] [id number] [phone]              Problem List              ICD-10-CM Priority Class Noted - Resolved    Hyperlipidemia (Chronic) E78.5   2011 - Present    HTN (hypertension) (Chronic) I10   2011 - Present   " COPD (chronic obstructive pulmonary disease) ([facility]) (Chronic) J44.9   7/11/2011 - Present    Anxiety disorder (Chronic) F41.9   7/11/2011 - Present    Current smoker (Chronic) F17.200   3/13/2012 - Present    Osteoporosis, post-menopausal M81.0   11/11/2013 - Present    Chronic kidney disease (CKD), stage III (moderate) (Chronic) N18.3   7/21/2014 - Present    Peripheral vascular disease (CMS-HCC) (Chronic) I73.9 High  4/27/2015 - Present    Chronic respiratory failure (CMS-HCC) J96.10   4/28/2015 - Present    Microalbuminuria R80.9   4/28/2015 - Present    Intractable vomiting with nausea R11.2 High  11/11/2016 - Present    Hypokalemia E87.6   11/11/2016 - Present    Essential hypertension (Chronic) I10   11/15/2016 - Present      Health Maintenance        Date Due Completion Dates    IMM DTaP/Tdap/Td Vaccine (1 - Tdap) 2/11/1959 ---    MAMMOGRAM 2/10/2017 2/10/2016, 11/4/2013, 10/29/2013, 10/29/2013    COLONOSCOPY 10/15/2019 10/15/2009 (N/S)    Override on 10/15/2009: (N/S)    BONE DENSITY 11/29/2021 11/29/2016, 11/4/2013            Current Immunizations     13-VALENT PCV PREVNAR 4/28/2015 12:15 PM    Influenza TIV (IM) 10/13/2014, 9/29/2012, 10/5/2011 11:20 AM    Influenza Vaccine Adult HD 8/25/2016, 9/25/2015    Pneumococcal polysaccharide vaccine (PPSV-23) 10/5/2011 11:15 AM    SHINGLES VACCINE 4/28/2015 12:15 PM      Below and/or attached are the medications your provider expects you to take. Review all of your home medications and newly ordered medications with your provider and/or pharmacist. Follow medication instructions as directed by your provider and/or pharmacist. Please keep your medication list with you and share with your provider. Update the information when medications are discontinued, doses are changed, or new medications (including over-the-counter products) are added; and carry medication information at all times in the event of emergency situations     Allergies:  CELEXA - (reactions not  documented)               Medications  Valid as of: January 31, 2017 - 11:30 AM    Generic Name Brand Name Tablet Size Instructions for use    Albuterol Sulfate (Aero Soln) albuterol 108 (90 BASE) MCG/ACT Inhale 2 Puffs by mouth every 6 hours as needed for Shortness of Breath.        Albuterol Sulfate (Aero Soln) albuterol 108 (90 BASE) MCG/ACT Inhale 2 Puffs by mouth every 6 hours as needed for Shortness of Breath.        Alendronate Sodium (Tab) FOSAMAX 70 MG Take 1 Tab by mouth every Saturday.        Aspirin (Tablet Delayed Response) ECOTRIN 81 MG Take 81 mg by mouth every day.        BusPIRone HCl (Tab) BUSPAR 5 MG Take 5 mg by mouth every day.        Cholecalciferol (Tab) cholecalciferol 1000 UNIT Take 1,000 Units by mouth every day.        ClonazePAM (Tab) KLONOPIN 0.5 MG TAKE 1 TABLET BY MOUTH 2 TIMES A DAY        Garlic   Take 1 tablet by mouth every day.        Ipratropium-Albuterol (Solution) DUONEB 0.5-2.5 (3) MG/3ML 3 mL by Nebulization route 4 times a day.        Lisinopril (Tab) PRINIVIL 20 MG Take 20 mg by mouth every day.        Metoprolol Succinate (TABLET SR 24 HR) TOPROL XL 50 MG Take 50 mg by mouth every day.        Ondansetron (TABLET DISPERSIBLE) ZOFRAN ODT 4 MG Take 1 Tab by mouth every 8 hours as needed for Nausea/Vomiting.        Simvastatin (Tab) ZOCOR 20 MG Take 20 mg by mouth every evening.        .                 Medicines prescribed today were sent to:     [facility]/PHARMACY #[id number] - [place], [place] - [address]    [address] [address]    Phone: [phone] Fax: [phone]    Open 24 Hours?: No    [facility]    [address] [facility] [address] [facility] [address]    Phone: [phone] Fax: [phone]      Medication refill instructions:       If your prescription bottle indicates you have medication refills left, it is not necessary to call your provider’s office. Please contact your pharmacy and they will refill your medication.    If your prescription bottle indicates you do not  have any refills left, you may request refills at any time through one of the following ways: The online [employer] system (except Urgent Care), by calling your provider’s office, or by asking your pharmacy to contact your provider’s office with a refill request. Medication refills are processed only during regular business hours and may not be available until the next business day. Your provider may request additional information or to have a follow-up visit with you prior to refilling your medication.   *Please Note: Medication refills are assigned a new Rx number when refilled electronically. Your pharmacy may indicate that no refills were authorized even though a new prescription for the same medication is available at the pharmacy. Please request the medicine by name with the pharmacy before contacting your provider for a refill.           [employer] Access Code: Activation code not generated  Current [employer] Status: Active

## 2017-02-01 NOTE — PROGRESS NOTES
Chief Complaint   Patient presents with   • Establish Care     Referred by  for COPD,PFT results.       HPI: This patient is a 76 y.o. Female  He was referred for COPD. The patient is an active smoker with 40 pack years to date, and no interest in quitting. She has been O2 dependent over the past 2 years. She has had progressively worsening exertional dyspnea and is now symptomatic with ADLs. She denies associated cough, wheezing, chest tightness, or edema. Pulmonary function testing confirms stage IV COPD with FEV1.67 L 30%, FEV1/FVC 43%, DLCO 45% predicted. She uses albuterol inhaler up to 6 times daily. Additionally she nebulizes with duo nebs when necessary. She had at some point in the distant past been prescribed Advair Diskus however did not continue it for unclear reasons. She denies over the past year.AECOPD. Chest x-ray November 2016 showed no acute cardiopulmonary process.  She is up-to-date on influenza and pneumococcal vaccines. She has a family history of emphysema in her father, and brother with lung cancer; both were smokers.      Past Medical History   Diagnosis Date   • Hypertension    • COPD    • Hyperlipidemia    • HTN (hypertension) 7/11/2011   • COPD (chronic obstructive pulmonary disease) ([facility]) 7/11/2011   • Anxiety disorder 7/11/2011   • Depression    • Chickenpox    • [place] measles    • Influenza    • Mumps    • Smallpox        Social History     Social History   • Marital Status: Single     Spouse Name: N/A   • Number of Children: N/A   • Years of Education: N/A     Occupational History   • Not on file.     Social History Main Topics   • Smoking status: Current Every Day Smoker -- 1.00 packs/day for 40 years     Types: Cigarettes   • Smokeless tobacco: Never Used      Comment: Down to 5 cigs a day per patient  Started in her 30s   • Alcohol Use: No   • Drug Use: No   • Sexual Activity: Not on file     Other Topics Concern   • Not on file     Social History Narrative        Family History   Problem Relation Age of Onset   • Heart Disease Mother    • Heart Disease Father    • Cancer Brother        Current Outpatient Prescriptions on File Prior to Visit   Medication Sig Dispense Refill   • clonazepam (KLONOPIN) 0.5 MG Tab TAKE 1 TABLET BY MOUTH 2 TIMES A DAY 60 Tab 0   • alendronate (FOSAMAX) 70 MG Tab Take 1 Tab by mouth every Saturday. 4 Tab 5   • GARLIC PO Take 1 tablet by mouth every day.     • busPIRone (BUSPAR) 5 MG Tab Take 5 mg by mouth every day.     • ipratropium-albuterol (DUONEB) 0.5-2.5 (3) MG/3ML nebulizer solution 3 mL by Nebulization route 4 times a day.     • lisinopril (PRINIVIL) 20 MG Tab Take 20 mg by mouth every day.     • metoprolol SR (TOPROL XL) 50 MG TABLET SR 24 HR Take 50 mg by mouth every day.     • simvastatin (ZOCOR) 20 MG Tab Take 20 mg by mouth every evening.     • aspirin EC (ECOTRIN) 81 MG TBEC Take 81 mg by mouth every day.     • ondansetron (ZOFRAN ODT) 4 MG TABLET DISPERSIBLE Take 1 Tab by mouth every 8 hours as needed for Nausea/Vomiting. (Patient not taking: Reported on 1/31/2017) 20 Tab 0   • albuterol 108 (90 BASE) MCG/ACT Aero Soln inhalation aerosol Inhale 2 Puffs by mouth every 6 hours as needed for Shortness of Breath.       No current facility-administered medications on file prior to visit.       Allergies: Celexa    ROS:   Constitutional: Denies fevers, chills, night sweats, fatigue or weight loss  Eyes: Denies vision loss, pain, drainage, double vision  Ears, Nose, Throat: Denies earache, difficulty hearing, tinnitus, nasal congestion, hoarseness  Cardiovascular: Denies chest pain, tightness, palpitations, orthopnea or edema  Respiratory: As in history of present illness  Sleep: Denies daytime sleepiness, snoring, apneas, insomnia, morning headaches  GI: Denies heartburn, dysphagia, nausea, abdominal pain, diarrhea or constipation  : Denies frequent urination, hematuria, discharge or painful urination  Musculoskeletal: Denies back  "pain, painful joints, sore muscles  Neurological: Denies weakness or headaches  Skin: No rashes    Blood pressure 124/72, pulse 69, temperature 36.3 °C (97.3 °F), resp. rate 14, height 1.676 m (5' 5.98\"), weight 49.896 kg (110 lb), SpO2 96 %.  Multi-Ox Readings  Multi Ox #1     O2 sat % at rest     O2 sat % on exertion     O2 sat average on exertion     Multi Ox #2     O2 sat % at rest     O2 sat % on exertion     O2 sat average on exertion       Oxygen Use 2   Oxygen Frequency 24/7   Duration of need     Is the patient mobile within the home?     CPAP Use?     BIPAP Use?     Servo Titration         Physical Exam:  Appearance: Cachectic female, in no acute distress  HEENT: Normocephalic, atraumatic, white sclera, PERRLA, oropharynx clear  Neck: No adenopathy or masses  Respiratory: no intercostal retractions or accessory muscle use  Lungs auscultation: Clear to auscultation bilaterally, diminished breath sounds, prolonged expiratory phase  Cardiovascular: Regular rate rhythm. No murmurs, rubs or gallops.  No LE edema  Abdomen: soft, nondistended  Gait: Normal  Digits: No clubbing, cyanosis  Motor: No focal deficits  Orientation: Oriented to time, person and place    Diagnosis:  1. Chronic obstructive pulmonary disease, unspecified COPD type (CMS-HCC)     2. Chronic respiratory failure with hypoxia (CMS-HCC)     3. Current smoker         Plan:  The patient has stage IV COPD, with ongoing tobacco use. She is not interested in quitting smoking. She would benefit from long-acting inhaled bronchodilator therapy. Samples of Symbicort 160/4.5 were provided to use 2 puffs twice a day with a spacer, rinsing mouth after use. We reviewed proper inhaler technique. Continue albuterol HFA versus nebulized every 4 hours when necessary.  Continue oxygen at 2 L/m 24/7.  She is a candidate for lung cancer screening CAT scan however after discussion, declined.  On account of her active smoking, she is ineligible for pulmonary " rehabilitation.  She is current on immunizations, and annual influenza vaccine is advised.  Return in about 4 weeks (around 2/28/2017) for follow up visit with APRN.

## 2017-02-10 NOTE — TELEPHONE ENCOUNTER
ANNUAL WELLNESS VISIT PRE-VISIT PLANNING     1.  Reviewed last PCP office visit assessment and plan notes:  NO    2.  If any orders were placed last visit do we have Results/Consult Notes?        •  Labs? No        •  Imaging? No        •  Referrals? No     3.  Patient Care Coordination Note was updated with diagnosis information:  No    4.  Patient is due for these Health Maintenance Topics:   Health Maintenance Due   Topic Date Due   • IMM DTaP/Tdap/Td Vaccine (1 - Tdap) 02/11/1959   • Annual Wellness Visit  04/28/2016   • MAMMOGRAM  02/10/2017             5.  Immunizations were updated in [employer] using [employer]?: Yes       •  [employer] Recommendations:  Tdap       •  Is patient due for Tdap/Shingles? Yes.  If yes, was patient alerted of copay? Yes    6.  Patient has:            •   COPD  1. Is patient under the care of a pulmonologist? yes      a. If yes, Care Teams was updated: Yes. Date of last visit:    2. Has patient ever completed a PFT or spirometry? N\A       a. If yes, when?     3.  If applicable, was CareTeam updated with oxygen/CPAP supplier? yes    4. Has patient ever had instruction on inhaler technique by health care professional? Yes    5. Does patient have an action plan for when they have trouble breathing? yes    6. Is patient interested in a referral to respiratory therapy for more information on COPD, inhaler technique, and/or information on establishing an action plan?  N\A          7.  Updated Care Team with [employer] and all specialists?        •   Gait devices, O2, CPAP, etc: yes        •   Eye professional: yes       •   Other specialists (GYN, cardiology, endo, etc): yes    8.  Is patient in need of any refills prior to office visit? No       •    Separate refill encounter created?: no    9.  Patient was informed to arrive 15 min prior to their scheduled appointment and bring in their medication bottles? yes    10.  Patient was advised: “This is a free wellness visit. The provider will  screen for medical conditions to help you stay healthy. If you have other concerns to address you may be asked to discuss these at a separate visit or there may be an additional fee.”  Yes

## 2017-02-14 PROBLEM — J96.11 CHRONIC RESPIRATORY FAILURE WITH HYPOXIA (HCC): Status: ACTIVE | Noted: 2017-01-01

## 2017-02-14 NOTE — PROGRESS NOTES
"Subjective:      Yoko Mistry is a 77 y.o. female who presents with Follow-Up            HPI     Patient returns for follow-up of her medical problems.    I referred her to pulmonologist for further evaluation of her long-standing COPD with chronic respiratory failure requiring 24 are oxygen 2 L/m. Her PFTs came back consistent with stage IV COPD. She was given samples of Symbicort inhaler with a spacer. She continues to use albuterol HFA only when needed. She denies any cough at this time. The pulmonologist offered lab screening due to the fact that she has ongoing cigarette use half a pack per day for 40 years. Patient declined the test. She is not motivated to quit at this time.    Her blood pressure is high this visit but was previously running good when she was seen by the pulmonologist 2 weeks ago and when I saw her 3 months ago. She continues to take lisinopril and metoprolol without any side effects.    In terms of her dyslipidemia, she continues to take simvastatin. Her last lipid panel in November 2016 came back all at target.    For her osteoporosis, she continues to take Fosamax and vitamin D. She is not on calcium because of history of hypercalcemia.    She continues to take part on as-needed basis for anxiety and Klonopin 0.5 mg one tablet twice daily as needed with good results.    Past medical history, past surgical history, family history reviewed-no changes    Social history reviewed-no changes    Allergies reviewed-no changes    Medications reviewed-no changes    ROS      Review of systems as per history of present illness, the rest are negative.     Objective:     /90 mmHg  Pulse 88  Temp(Src) 37.3 °C (99.2 °F)  Resp 18  Ht 1.651 m (5' 5\")  Wt 51.71 kg (114 lb)  BMI 18.97 kg/m2  SpO2 90%     Physical Exam     Examined alert, awake, oriented, not in distress    Neck-supple, no lymphadenopathy, no thyromegaly  Lungs-diminished breath sounds all lung fields, no rales, no " wheezes  Heart-regular rate and rhythm, no murmur  Extremities-no edema, clubbing, cyanosis       No visits with results within 1 Month(s) from this visit.  Latest known visit with results is:    Admission on 11/10/2016, Discharged on 11/11/2016   Component Date Value   • WBC 11/10/2016 5.4    • RBC 11/10/2016 4.66    • Hemoglobin 11/10/2016 13.8    • Hematocrit 11/10/2016 40.7    • MCV 11/10/2016 87.3    • MCH 11/10/2016 29.6    • MCHC 11/10/2016 33.9    • RDW 11/10/2016 37.9    • Platelet Count 11/10/2016 145*   • MPV 11/10/2016 10.4    • Nucleated RBC 11/10/2016 0.00    • NRBC (Absolute) 11/10/2016 0.00    • Neutrophils-Polys 11/10/2016 63.10    • Lymphocytes 11/10/2016 23.40    • Monocytes 11/10/2016 9.00    • Eosinophils 11/10/2016 0.90    • Basophils 11/10/2016 0.90    • Neutrophils (Absolute) 11/10/2016 3.55    • Lymphs (Absolute) 11/10/2016 1.26    • Monos (Absolute) 11/10/2016 0.49    • Eos (Absolute) 11/10/2016 0.05    • Baso (Absolute) 11/10/2016 0.05    • Sodium 11/10/2016 135    • Potassium 11/10/2016 3.2*   • Chloride 11/10/2016 92*   • Co2 11/10/2016 35*   • Anion Gap 11/10/2016 8.0    • Glucose 11/10/2016 116*   • Bun 11/10/2016 10    • Creatinine 11/10/2016 0.79    • Calcium 11/10/2016 8.9    • AST(SGOT) 11/10/2016 28    • ALT(SGPT) 11/10/2016 10    • Alkaline Phosphatase 11/10/2016 27*   • Total Bilirubin 11/10/2016 0.5    • Albumin 11/10/2016 3.9    • Total Protein 11/10/2016 6.1    • Globulin 11/10/2016 2.2    • A-G Ratio 11/10/2016 1.8    • Lipase 11/10/2016 26    • GFR If  11/10/2016 >60    • GFR If Non  [employer]meri* 11/10/2016 >60    • POC Color 11/10/2016 Yellow    • POC Appearance 11/10/2016 Clear    • POC Glucose 11/10/2016 Negative    • POC Ketones 11/10/2016 Negative    • POC Specific Gravity 11/10/2016 1.015    • POC Blood 11/10/2016 Negative    • POC Urine PH 11/10/2016 >=9.0*   • POC Protein 11/10/2016 30*   • POC Nitrites 11/10/2016 Negative    • POC Leukocyte  Esterase 11/10/2016 Negative    • Bands-Stabs 11/10/2016 2.70    • Manual Diff Status 11/10/2016 PERFORMED    • Peripheral Smear Review 11/10/2016 see below    • Plt Estimation 11/10/2016 Decreased    • RBC Morphology 11/10/2016 Normal    • WBC 11/11/2016 6.3    • RBC 11/11/2016 4.21    • Hemoglobin 11/11/2016 12.8    • Hematocrit 11/11/2016 37.5    • MCV 11/11/2016 89.1    • MCH 11/11/2016 30.4    • MCHC 11/11/2016 34.1    • RDW 11/11/2016 38.4    • Platelet Count 11/11/2016 197    • MPV 11/11/2016 9.7    • Neutrophils-Polys 11/11/2016 75.70*   • Lymphocytes 11/11/2016 16.80*   • Monocytes 11/11/2016 6.80    • Eosinophils 11/11/2016 0.00    • Basophils 11/11/2016 0.20    • Immature Granulocytes 11/11/2016 0.50    • Nucleated RBC 11/11/2016 0.00    • Neutrophils (Absolute) 11/11/2016 4.77    • Lymphs (Absolute) 11/11/2016 1.06    • Monos (Absolute) 11/11/2016 0.43    • Eos (Absolute) 11/11/2016 0.00    • Baso (Absolute) 11/11/2016 0.01    • Immature Granulocytes (a* 11/11/2016 0.03    • NRBC (Absolute) 11/11/2016 0.00    • Sodium 11/11/2016 133*   • Potassium 11/11/2016 4.2    • Chloride 11/11/2016 102    • Co2 11/11/2016 26    • Glucose 11/11/2016 113*   • Bun 11/11/2016 10    • Creatinine 11/11/2016 0.73    • Calcium 11/11/2016 8.8    • Anion Gap 11/11/2016 5.0    • Magnesium 11/11/2016 1.4*   • GFR If  11/11/2016 >60    • GFR If Non  Ameri* 11/11/2016 >60       Assessment/Plan:     1. Chronic obstructive pulmonary disease, unspecified COPD type (CMS-HCC)  She was just started on Symbicort by the pulmonologist. She will continue using this regularly. She will use the albuterol HFA as needed. Continue 24-hour oxygen. She is currently compensated.  - LIPID PROFILE; Future  - COMP METABOLIC PANEL; Future  - VITAMIN D,25 HYDROXY; Future    2. Chronic respiratory failure with hypoxia (CMS-HCC)  Same as #1. Continue follow-up with pulmonologist.  - LIPID PROFILE; Future  - COMP METABOLIC PANEL;  Future  - VITAMIN D,25 HYDROXY; Future    3. Essential hypertension  Elevated blood pressure today but previously was running good. Stay on current medications and monitor blood pressures at home. Call if blood pressure is consistently elevated 150/90 or higher. Otherwise follow-up in a few months.  - LIPID PROFILE; Future  - COMP METABOLIC PANEL; Future  - VITAMIN D,25 HYDROXY; Future    4. Hyperlipidemia, unspecified hyperlipidemia type   At her Last blood work. I will do follow-up lipid panel next visit. Continue simvastatin.  - LIPID PROFILE; Future  - COMP METABOLIC PANEL; Future  - VITAMIN D,25 HYDROXY; Future    5. Osteoporosis, post-menopausal  Continue Fosamax, vitamin D supplementation.  - LIPID PROFILE; Future  - COMP METABOLIC PANEL; Future  - VITAMIN D,25 HYDROXY; Future    6. Anxiety  Continue BuSpar as needed and Klonopin which control her symptoms.  - LIPID PROFILE; Future  - COMP METABOLIC PANEL; Future  - VITAMIN D,25 HYDROXY; Future      Please note that this dictation was created using voice recognition software. I have worked with consultants from the vendor as well as technical experts from [employer] to optimize the interface. I have made every reasonable attempt to correct obvious errors, but I expect that there are errors of grammar and possibly content I did not discover before finalizing the note.

## 2017-02-14 NOTE — MR AVS SNAPSHOT
"        Yoko Mistry   2017 9:20 AM   Office Visit   MRN: [id number]    Department:  [facility]   Dept Phone:  [phone]    Description:  Female : 1940   Provider:  Mireille Benito M.D.           Reason for Visit     Follow-Up 3 month follow up       Allergies as of 2017     Allergen Noted Reactions    Celexa [Citalopram Hydrobromide] 2014       Low sodium      You were diagnosed with     Chronic obstructive pulmonary disease, unspecified COPD type (CMS-[facility])   [5972234]       Chronic respiratory failure with hypoxia (CMS-[facility])   [526655]       Essential hypertension   [5483097]       Hyperlipidemia, unspecified hyperlipidemia type   [9045962]       Osteoporosis, post-menopausal   [084722]       Anxiety   [751993]         Vital Signs     Blood Pressure Pulse Temperature Respirations Height Weight    150/90 mmHg 88 37.3 °C (99.2 °F) 18 1.651 m (5' 5\") 51.71 kg (114 lb)    Body Mass Index Oxygen Saturation Smoking Status             18.97 kg/m2 90% Current Every Day Smoker         Basic Information     Date Of Birth Sex Race Ethnicity Preferred Language    1940 Female White Non- English      Your appointments     Mar 01, 2017 11:00 AM   Established Patient Pul with RICK Romeo   [facility] (--)    [address]  [facility] [address]  [facility] [address] [phone]            Mar 02, 2017 10:00 AM   ANNUAL WELLNESS with Mireille Benito M.D., [facility]    [facility] (--)    [address]  [address]  [facility] [id number] [phone]            Aug 17, 2017 10:20 AM   Established Patient with Mireille Benito M.D.   [facility] (--)    [address]  [address]  [facility] [id number] [phone]           You will be receiving a confirmation call a few days before your appointment from our automated call confirmation system.              Problem List              ICD-10-CM Priority Class Noted - Resolved   " Hyperlipidemia (Chronic) E78.5   7/11/2011 - Present    HTN (hypertension) (Chronic) I10   7/11/2011 - Present    COPD (chronic obstructive pulmonary disease) (HCC) (Chronic) J44.9   7/11/2011 - Present    Anxiety disorder (Chronic) F41.9   7/11/2011 - Present    Current smoker (Chronic) F17.200   3/13/2012 - Present    Osteoporosis, post-menopausal M81.0   11/11/2013 - Present    Chronic kidney disease (CKD), stage III (moderate) (Chronic) N18.3   7/21/2014 - Present    Peripheral vascular disease (CMS-HCC) (Chronic) I73.9 High  4/27/2015 - Present    Chronic respiratory failure (CMS-HCC) J96.10   4/28/2015 - Present    Microalbuminuria R80.9   4/28/2015 - Present    Intractable vomiting with nausea R11.2 High  11/11/2016 - Present    Hypokalemia E87.6   11/11/2016 - Present    Essential hypertension (Chronic) I10   11/15/2016 - Present    Chronic respiratory failure with hypoxia (CMS-HCC) J96.11   2/14/2017 - Present      Health Maintenance        Date Due Completion Dates    IMM DTaP/Tdap/Td Vaccine (1 - Tdap) 2/11/1959 ---    MAMMOGRAM 2/10/2017 2/10/2016, 11/4/2013, 10/29/2013, 10/29/2013    COLONOSCOPY 10/15/2019 10/15/2009 (N/S)    Override on 10/15/2009: (N/S)    BONE DENSITY 11/29/2021 11/29/2016, 11/4/2013            Current Immunizations     13-VALENT PCV PREVNAR 4/28/2015 12:15 PM    Influenza TIV (IM) 10/13/2014, 9/29/2012, 10/5/2011 11:20 AM    Influenza Vaccine Adult HD 8/25/2016, 9/25/2015    Pneumococcal polysaccharide vaccine (PPSV-23) 10/5/2011 11:15 AM    SHINGLES VACCINE 4/28/2015 12:15 PM      Below and/or attached are the medications your provider expects you to take. Review all of your home medications and newly ordered medications with your provider and/or pharmacist. Follow medication instructions as directed by your provider and/or pharmacist. Please keep your medication list with you and share with your provider. Update the information when medications are discontinued, doses are changed,  or new medications (including over-the-counter products) are added; and carry medication information at all times in the event of emergency situations     Allergies:  CELEXA - (reactions not documented)               Medications  Valid as of: February 14, 2017 -  9:45 AM    Generic Name Brand Name Tablet Size Instructions for use    Albuterol Sulfate (Aero Soln) albuterol 108 (90 BASE) MCG/ACT Inhale 2 Puffs by mouth every 6 hours as needed for Shortness of Breath.        Albuterol Sulfate (Aero Soln) albuterol 108 (90 BASE) MCG/ACT Inhale 2 Puffs by mouth every 6 hours as needed for Shortness of Breath.        Alendronate Sodium (Tab) FOSAMAX 70 MG Take 1 Tab by mouth every Saturday.        Aspirin (Tablet Delayed Response) ECOTRIN 81 MG Take 81 mg by mouth every day.        BusPIRone HCl (Tab) BUSPAR 5 MG Take 5 mg by mouth every day.        Cholecalciferol (Tab) cholecalciferol 1000 UNIT Take 1,000 Units by mouth every day.        ClonazePAM (Tab) KLONOPIN 0.5 MG TAKE 1 TABLET BY MOUTH 2 TIMES A DAY        Garlic   Take 1 tablet by mouth every day.        Ipratropium-Albuterol (Solution) DUONEB 0.5-2.5 (3) MG/3ML 3 mL by Nebulization route 4 times a day.        Lisinopril (Tab) PRINIVIL 20 MG Take 20 mg by mouth every day.        Metoprolol Succinate (TABLET SR 24 HR) TOPROL XL 50 MG Take 50 mg by mouth every day.        Ondansetron (TABLET DISPERSIBLE) ZOFRAN ODT 4 MG Take 1 Tab by mouth every 8 hours as needed for Nausea/Vomiting.        Simvastatin (Tab) ZOCOR 20 MG Take 20 mg by mouth every evening.        .                 Medicines prescribed today were sent to:     [facility]/PHARMACY #[id number] - [place], [place] - [address]    [address] [place] [address]    Phone: [phone] Fax: [phone]    Open 24 Hours?: No    [facility] [place]    [address] [place] [address]    Phone: [phone] Fax: [phone]      Medication refill instructions:       If your prescription bottle indicates you have  medication refills left, it is not necessary to call your provider’s office. Please contact your pharmacy and they will refill your medication.    If your prescription bottle indicates you do not have any refills left, you may request refills at any time through one of the following ways: The online [employer] system (except Urgent Care), by calling your provider’s office, or by asking your pharmacy to contact your provider’s office with a refill request. Medication refills are processed only during regular business hours and may not be available until the next business day. Your provider may request additional information or to have a follow-up visit with you prior to refilling your medication.   *Please Note: Medication refills are assigned a new Rx number when refilled electronically. Your pharmacy may indicate that no refills were authorized even though a new prescription for the same medication is available at the pharmacy. Please request the medicine by name with the pharmacy before contacting your provider for a refill.        Your To Do List     Future Labs/Procedures Complete By Expires    COMP METABOLIC PANEL  As directed 2/15/2018    LIPID PROFILE  As directed 2/15/2018    VITAMIN D,25 HYDROXY  As directed 2/15/2018         [employer] Access Code: Activation code not generated  Current [employer] Status: Active

## 2017-03-01 NOTE — MR AVS SNAPSHOT
"        Yoko Mistry   3/1/2017 11:00 AM   Office Visit   MRN: [id number]    Department:  [facility]   Dept Phone:  [phone]    Description:  Female : 1940   Provider:  RICK Romeo           Reason for Visit     Follow-Up           Allergies as of 3/1/2017     Allergen Noted Reactions    Celexa [Citalopram Hydrobromide] 2014       Low sodium      You were diagnosed with     Chronic obstructive pulmonary disease, unspecified COPD type (CMS-HCC)   [4686157]       Chronic respiratory failure with hypoxia (CMS-HCC)   [080144]       Current smoker   [819632]       Essential hypertension   [3670216]       Chronic kidney disease (CKD), stage III (moderate)   [750673]         Vital Signs     Blood Pressure Pulse Respirations Height Weight Body Mass Index    126/82 mmHg 71 12 1.702 m (5' 7.01\") 51.256 kg (113 lb) 17.69 kg/m2    Smoking Status                   Current Every Day Smoker           Basic Information     Date Of Birth Sex Race Ethnicity Preferred Language    1940 Female White Non- English      Your appointments     Mar 02, 2017 10:00 AM   ANNUAL WELLNESS with Mireille Benito M.D., [employer]    [facility] [employer] [facility] (--)    [address]  [address]  [place] [id number] [phone]            2017 11:00 AM   Established Patient Pul with RICK Romeo   [facility] (--)    [address]  [place] [id number] [phone]            Aug 17, 2017 10:20 AM   Established Patient with Mireille Benito M.D.   [facility] (--)    [address]  [address]  [place] [id number] [phone]           You will be receiving a confirmation call a few days before your appointment from our automated call confirmation system.              Problem List              ICD-10-CM Priority Class Noted - Resolved    Hyperlipidemia (Chronic) E78.5   2011 - Present    HTN (hypertension) (Chronic) " I10   7/11/2011 - Present    COPD (chronic obstructive pulmonary disease) (HCC) (Chronic) J44.9   7/11/2011 - Present    Anxiety disorder (Chronic) F41.9   7/11/2011 - Present    Current smoker (Chronic) F17.200   3/13/2012 - Present    Osteoporosis, post-menopausal M81.0   11/11/2013 - Present    Chronic kidney disease (CKD), stage III (moderate) (Chronic) N18.3   7/21/2014 - Present    Peripheral vascular disease (CMS-HCC) (Chronic) I73.9 High  4/27/2015 - Present    Chronic respiratory failure (CMS-HCC) J96.10   4/28/2015 - Present    Microalbuminuria R80.9   4/28/2015 - Present    Intractable vomiting with nausea R11.2 High  11/11/2016 - Present    Hypokalemia E87.6   11/11/2016 - Present    Essential hypertension (Chronic) I10   11/15/2016 - Present    Chronic respiratory failure with hypoxia (CMS-HCC) J96.11   2/14/2017 - Present      Health Maintenance        Date Due Completion Dates    IMM DTaP/Tdap/Td Vaccine (1 - Tdap) 2/11/1959 ---    MAMMOGRAM 2/10/2017 2/10/2016, 11/4/2013, 10/29/2013, 10/29/2013    COLONOSCOPY 10/15/2019 10/15/2009 (N/S)    Override on 10/15/2009: (N/S)    BONE DENSITY 11/29/2021 11/29/2016, 11/4/2013            Current Immunizations     13-VALENT PCV PREVNAR 4/28/2015 12:15 PM    Influenza TIV (IM) 10/13/2014, 9/29/2012, 10/5/2011 11:20 AM    Influenza Vaccine Adult HD 8/25/2016, 9/25/2015    Pneumococcal polysaccharide vaccine (PPSV-23) 10/5/2011 11:15 AM    SHINGLES VACCINE 4/28/2015 12:15 PM      Below and/or attached are the medications your provider expects you to take. Review all of your home medications and newly ordered medications with your provider and/or pharmacist. Follow medication instructions as directed by your provider and/or pharmacist. Please keep your medication list with you and share with your provider. Update the information when medications are discontinued, doses are changed, or new medications (including over-the-counter products) are added; and carry  medication information at all times in the event of emergency situations     Allergies:  CELEXA - (reactions not documented)               Medications  Valid as of: March 01, 2017 - 11:26 AM    Generic Name Brand Name Tablet Size Instructions for use    Albuterol Sulfate (Aero Soln) albuterol 108 (90 BASE) MCG/ACT Inhale 2 Puffs by mouth every 6 hours as needed for Shortness of Breath.        Albuterol Sulfate (Aero Soln) albuterol 108 (90 BASE) MCG/ACT Inhale 2 Puffs by mouth every 6 hours as needed for Shortness of Breath.        Alendronate Sodium (Tab) FOSAMAX 70 MG Take 1 Tab by mouth every Saturday.        Aspirin (Tablet Delayed Response) ECOTRIN 81 MG Take 81 mg by mouth every day.        Budesonide-Formoterol Fumarate (Aerosol) SYMBICORT 160-4.5 MCG/ACT Inhale 2 Puffs by mouth 2 Times a Day. Use spacer. Rinse mouth after each use.        BusPIRone HCl (Tab) BUSPAR 5 MG Take 5 mg by mouth every day.        Cholecalciferol (Tab) cholecalciferol 1000 UNIT Take 1,000 Units by mouth every day.        ClonazePAM (Tab) KLONOPIN 0.5 MG TAKE 1 TABLET BY MOUTH 2 TIMES A DAY        Garlic   Take 1 tablet by mouth every day.        Ipratropium-Albuterol (Solution) DUONEB 0.5-2.5 (3) MG/3ML 3 mL by Nebulization route 4 times a day.        Lisinopril (Tab) PRINIVIL 20 MG Take 20 mg by mouth every day.        Metoprolol Succinate (TABLET SR 24 HR) TOPROL XL 50 MG Take 50 mg by mouth every day.        Ondansetron (TABLET DISPERSIBLE) ZOFRAN ODT 4 MG Take 1 Tab by mouth every 8 hours as needed for Nausea/Vomiting.        Simvastatin (Tab) ZOCOR 20 MG Take 20 mg by mouth every evening.        .                 Medicines prescribed today were sent to:     [facility]/PHARMACY #[id number] - [place] - [address]    [address] [place] [address]    Phone: [phone] Fax: [phone]    Open 24 Hours?: No    [facility]    [address] [address] [place] [address]    Phone: [phone] Fax: [phone]      Medication refill  instructions:       If your prescription bottle indicates you have medication refills left, it is not necessary to call your provider’s office. Please contact your pharmacy and they will refill your medication.    If your prescription bottle indicates you do not have any refills left, you may request refills at any time through one of the following ways: The online [employer] system (except Urgent Care), by calling your provider’s office, or by asking your pharmacy to contact your provider’s office with a refill request. Medication refills are processed only during regular business hours and may not be available until the next business day. Your provider may request additional information or to have a follow-up visit with you prior to refilling your medication.   *Please Note: Medication refills are assigned a new Rx number when refilled electronically. Your pharmacy may indicate that no refills were authorized even though a new prescription for the same medication is available at the pharmacy. Please request the medicine by name with the pharmacy before contacting your provider for a refill.           [employer] Access Code: Activation code not generated  Current [employer] Status: Active

## 2017-03-01 NOTE — PROGRESS NOTES
Chief Complaint   Patient presents with   • Follow-Up       HPI:  Yoko Mistry is a 77 y.o. year old female here today for follow-up on Stage 4 COPD.  The patient is an active smoker with 40 pack years to date, and no interest in quitting. She has been O2 dependent over the past 2 years using 2L 24/7. She has had progressively worsening exertional dyspnea and is now symptomatic with ADLs.  Pulmonary function testing confirms stage IV COPD with FEV1.67 L 30%, FEV1/FVC 43%, DLCO 45% predicted. She uses albuterol inhaler up to 6 times daily. Additionally she nebulizes with duo nebs when necessary. She had at some point in the distant past been prescribed Advair Diskus however did not continue it for unclear reasons. She denies over the past year.AECOPD. Chest x-ray November 2016 showed no acute cardiopulmonary process.  She is up-to-date on influenza and pneumococcal vaccines. She has a family history of emphysema in her father, and brother with lung cancer; both were smokers.  She was started on Symbicort 160/4.5 2 puffs BID last OV and MARYANN prn. She has been using this consistently with spacer and feels dyspnea is better because she does not reach for MARYANN as much. She also uses Nebulizer BID. We reviewed appropriate inhaler use. She denies productive cough or wheezing. She notes dyspnea with over exerting herself. She is currently spring cleaning and notes occasional sneeze. She denies fever, chills, night sweats, chest pain, ankle swelling or hemoptysis.      ROS: As per HPI and otherwise negative if not stated.    Past Medical History   Diagnosis Date   • Hypertension    • COPD    • Hyperlipidemia    • HTN (hypertension) 7/11/2011   • COPD (chronic obstructive pulmonary disease) (CMS-[facility]) 7/11/2011   • Anxiety disorder 7/11/2011   • Depression    • Chickenpox    • La[place] measles    • Influenza    • Mumps    • Smallpox        Past Surgical History   Procedure Laterality Date   • Femoral popliteal angioplasty with  "stent     • Cholecystectomy     • Abdominal hysterectomy total     • Hysterectomy laparoscopy     • Cardiovascular       cardio ablation       Family History   Problem Relation Age of Onset   • Heart Disease Mother      aortic stenosis   • Heart Disease Father    • Cancer Brother        Social History     Social History   • Marital Status: Single     Spouse Name: N/A   • Number of Children: N/A   • Years of Education: N/A     Occupational History   • Not on file.     Social History Main Topics   • Smoking status: Current Every Day Smoker -- 0.50 packs/day for 40 years     Types: Cigarettes   • Smokeless tobacco: Never Used      Comment: Down to 5 cigs a day per patient  Started in her 30s   • Alcohol Use: No   • Drug Use: No   • Sexual Activity: Not on file     Other Topics Concern   • Not on file     Social History Narrative       Allergies as of 03/01/2017 - Geovanny as Reviewed 03/01/2017   Allergen Reaction Noted   • Celexa [citalopram hydrobromide]  04/28/2014        @Vital signs for this encounter:  Filed Vitals:    03/01/17 1100   Height: 1.702 m (5' 7.01\")   Weight: 51.256 kg (113 lb)   Weight % change since last entry.: 0 %   BP: 126/82   Pulse: 71   BMI (Calculated): 17.69   Resp: 12   O2 sat % on O2: 97 %   O2 Flow Rate (L/min): 2       Current medications as of today   Current Outpatient Prescriptions   Medication Sig Dispense Refill   • clonazepam (KLONOPIN) 0.5 MG Tab TAKE 1 TABLET BY MOUTH 2 TIMES A DAY 60 Tab 0   • albuterol (VENTOLIN HFA) 108 (90 BASE) MCG/ACT Aero Soln inhalation aerosol Inhale 2 Puffs by mouth every 6 hours as needed for Shortness of Breath.     • vitamin D (CHOLECALCIFEROL) 1000 UNIT Tab Take 1,000 Units by mouth every day.     • alendronate (FOSAMAX) 70 MG Tab Take 1 Tab by mouth every Saturday. 4 Tab 5   • GARLIC PO Take 1 tablet by mouth every day.     • ondansetron (ZOFRAN ODT) 4 MG TABLET DISPERSIBLE Take 1 Tab by mouth every 8 hours as needed for Nausea/Vomiting. (Patient not " taking: Reported on 1/31/2017) 20 Tab 0   • busPIRone (BUSPAR) 5 MG Tab Take 5 mg by mouth every day.     • ipratropium-albuterol (DUONEB) 0.5-2.5 (3) MG/3ML nebulizer solution 3 mL by Nebulization route 4 times a day.     • lisinopril (PRINIVIL) 20 MG Tab Take 20 mg by mouth every day.     • metoprolol SR (TOPROL XL) 50 MG TABLET SR 24 HR Take 50 mg by mouth every day.     • simvastatin (ZOCOR) 20 MG Tab Take 20 mg by mouth every evening.     • albuterol 108 (90 BASE) MCG/ACT Aero Soln inhalation aerosol Inhale 2 Puffs by mouth every 6 hours as needed for Shortness of Breath.     • aspirin EC (ECOTRIN) 81 MG TBEC Take 81 mg by mouth every day.       No current facility-administered medications for this visit.         Physical Exam:   Gen:           Alert and oriented, No apparent distress. Mood and affect appropriate, normal interaction with examiner. Thin.  Eyes:          PERRL, EOM intact, sclere white, conjunctive moist.  Ears:          Not examined.  Hearing:     Grossly intact.  Nose:          Normal, no lesions or deformities.  Dentition:    Poor dentition.  Oropharynx:   Tongue normal, posterior pharynx without erythema or exudate.  Mallampati Classification: 2  Neck:        Supple, trachea midline, no masses.  Respiratory Effort: No intercostal retractions or use of accessory muscles.   Lung Auscultation:      Significantly diminished t/o; no rales, rhonchi or wheezing.  CV:            Regular rate and rhythm. No murmurs, rubs or gallops.  Abd:           Not examined.   Lymphadenopathy: Not examined.  Gait and Station: Normal.  Digits and Nails: No clubbing, cyanosis, petechiae, or nodes.   Cranial Nerves: II-XII grossly intact.  Skin:        No rashes, lesions or ulcers noted.               Ext:           No cyanosis or edema.      Assessment:  1. Chronic obstructive pulmonary disease, unspecified COPD type (CMS-HCC)     2. Chronic respiratory failure with hypoxia (CMS-HCC)     3. Current smoker     4.  Essential hypertension     5. Chronic kidney disease (CKD), stage III (moderate)         Immunizations:    Flu:2016  Pneumovax 23:2011  Prevnar 13:2015    Plan:  1. Continue inhaler regimen. RX and coupon card given to patient. Reviewed appropriate inhaler use. MARYANN prn. Nebulizer BID.  2. Patient declines CT scan of chest although this is recommendation for her continued smoking.  3. Smoking cessation encouraged, but patient declines.  4. Continue 2L oxygen 24/7. Patient continues to benefit from its use.  5. Discussed respiratory hygiene.  6. Follow up in 4 months, sooner if needed.

## 2017-03-02 NOTE — PROGRESS NOTES
Chief Complaint   Patient presents with   • Annual Wellness Visit         HPI:  Yoko is a 77 y.o. female here for Medicare Annual Wellness Visit    In terms of patient's COPD stage IV, she continues to take Symbicort inhaler and albuterol inhaler as a rescue inhaler. She is on oxygen 2 L/m 24 hours a day. She continues to smoke cigarettes in spite of her pulmonary problems 4-5 cigarettes per day. At this time she is not motivated to quit. She continues to follow-up with her pulmonologist regularly.    She continues to take simvastatin for hyperlipidemia without any myalgias.    Her blood pressure is under control on lisinopril and metoprolol.    She continues to take Fosamax for osteoporosis which was started in February 2013. She will continue the medication until 2018. She is on vitamin D supplementation. Was advised not to take calcium because of prior elevation of her calcium level.    In terms of her anxiety, she continues to take BuSpar on as needed basis with good results. She takes Klonopin 0.5 mg one tablet twice daily regularly with good results.    She has history of left peripheral arterial disease status post femoropopliteal bypass surgery in 2009. She is currently asymptomatic without any claudication pain. As mentioned above she continues to smoke in spite of her history of PAD. She is on simvastatin with last LDL of 93 and November 2016.      Patient Active Problem List    Diagnosis Date Noted   • Intractable vomiting with nausea 11/11/2016     Priority: High   • Peripheral vascular disease (CMS-HCC) 04/27/2015     Priority: High   • Chronic respiratory failure with hypoxia (CMS-HCC) 02/14/2017   • Essential hypertension 11/15/2016   • Hypokalemia 11/11/2016   • Chronic respiratory failure (CMS-[facility]) 04/28/2015   • Microalbuminuria 04/28/2015   • Chronic kidney disease (CKD), stage III (moderate) 07/21/2014   • Osteoporosis, post-menopausal 11/11/2013   • Current smoker 03/13/2012   • Hyperlipidemia  07/11/2011   • HTN (hypertension) 07/11/2011   • COPD (chronic obstructive pulmonary disease) ([facility]) 07/11/2011   • Anxiety disorder 07/11/2011       Current Outpatient Prescriptions   Medication Sig Dispense Refill   • budesonide-formoterol (SYMBICORT) 160-4.5 MCG/ACT Aerosol Inhale 2 Puffs by mouth 2 Times a Day.     • budesonide-formoterol (SYMBICORT) 160-4.5 MCG/ACT Aerosol Inhale 2 Puffs by mouth 2 Times a Day. Use spacer. Rinse mouth after each use. 1 Inhaler 5   • clonazepam (KLONOPIN) 0.5 MG Tab TAKE 1 TABLET BY MOUTH 2 TIMES A DAY 60 Tab 0   • albuterol (VENTOLIN HFA) 108 (90 BASE) MCG/ACT Aero Soln inhalation aerosol Inhale 2 Puffs by mouth every 6 hours as needed for Shortness of Breath.     • vitamin D (CHOLECALCIFEROL) 1000 UNIT Tab Take 1,000 Units by mouth every day.     • alendronate (FOSAMAX) 70 MG Tab Take 1 Tab by mouth every Saturday. 4 Tab 5   • GARLIC PO Take 1 tablet by mouth every day.     • ondansetron (ZOFRAN ODT) 4 MG TABLET DISPERSIBLE Take 1 Tab by mouth every 8 hours as needed for Nausea/Vomiting. 20 Tab 0   • busPIRone (BUSPAR) 5 MG Tab Take 5 mg by mouth every day.     • ipratropium-albuterol (DUONEB) 0.5-2.5 (3) MG/3ML nebulizer solution 3 mL by Nebulization route 4 times a day.     • lisinopril (PRINIVIL) 20 MG Tab Take 20 mg by mouth every day.     • metoprolol SR (TOPROL XL) 50 MG TABLET SR 24 HR Take 50 mg by mouth every day.     • simvastatin (ZOCOR) 20 MG Tab Take 20 mg by mouth every evening.     • albuterol 108 (90 BASE) MCG/ACT Aero Soln inhalation aerosol Inhale 2 Puffs by mouth every 6 hours as needed for Shortness of Breath.     • aspirin EC (ECOTRIN) 81 MG TBEC Take 81 mg by mouth every day.       No current facility-administered medications for this visit.        The patient reports adherence to this regimen     Current supplements as per medication list.   Chronic narcotic pain medicines: no    Allergies: Celexa    Current social contact/activities: No     Is patient  current with immunizations?  no   If no, due for Tdap    She  reports that she has been smoking Cigarettes.  She has a 10 pack-year smoking history. She has never used smokeless tobacco. She reports that she does not drink alcohol or use illicit drugs.  Ready to quit: Not Answered  Counseling given: Not Answered        DPA/Advanced Directive:  Patient does have an advanced directive.  If not on file, instructed to bring in a copy to scan into her chart. If no advanced directive exists, a packet and workshop information was provided    ROS:    Gait: Uses no assistive device   Ostomy: no   Other tubes: no   Amputations: no   Chronic oxygen use yes  [facility] [place]  Last eye exam 02/15/17   : Reports incontinence.       Screening:  COPD  1. Is patient under the care of a pulmonologist? yes      a. If yes, Care Teams was updated: No. Date of last visit: 03/01/17    2. Has patient ever completed a PFT or spirometry? yes       a. If yes, when?     3.  If applicable, was CareTeam updated with oxygen/CPAP supplier? yes    4. Has patient ever had instruction on inhaler technique by health care professional? Yes    5. Does patient have an action plan for when they have trouble breathing? yes    6. Is patient interested in a referral to respiratory therapy for more information on COPD, inhaler technique, and/or information on establishing an action plan?  no    Little interest or pleasure in doing things?  0 - not at all  Feeling down, depressed, or hopeless?  0 - not at all  Patient Health Questionnaire Score: 0    If depressive symptoms identified deferred to follow up visit unless specifically addressed in assessment and plan.    Screening for Cognitive Impairment    Three Minute Recall (banana, sunrise, fence)  2/3    Draw clock face with all 12 numbers set to the hand to show 10 minutes past 11 o'clock  1 5/5  Cognitive concerns identified deferred for follow up unless specifically addressed in assessment and  plan.    Fall Risk Assessment    Has the patient had two or more falls in the last year or any fall with injury in the last year?  No    Safety Assessment    Throw rugs on floor.  No  Handrails on all stairs.  Yes  Good lighting in all hallways.  Yes  Difficulty hearing.  Yes  Patient counseled about all safety risks that were identified.    Functional Assessment ADLs    Are there any barriers preventing you from cooking for yourself or meeting nutritional needs?  No.    Are there any barriers preventing you from driving safely or obtaining transportation?  No.    Are there any barriers preventing you from using a telephone or calling for help?  No.    Are there any barriers preventing you from shopping?  No.    Are there any barriers preventing you from taking care of your own finances?  No.    Are there any barriers preventing you from managing your medications?  No.    Are currently engaging any exercise or physical activity?  No.       Health Maintenance Summary                IMM DTaP/Tdap/Td Vaccine Overdue 2/11/1959     Annual Wellness Visit Overdue 4/28/2016      Not specified 4/28/2015      Patient has more history with this topic...    MAMMOGRAM Overdue 2/10/2017      Done 2/10/2016 MA-SCREEN MAMMO W/CAD-BILAT     Patient has more history with this topic...    PFT SCREENING-FEV1 AND FEV/FVC RATIO / SPIROMETRY SHOULD BE PERFORMED ANNUALLY Next Due 1/31/2018      Done 1/31/2017 AMB PULMONARY FUNCTION TEST/LAB    COLONOSCOPY Next Due 10/15/2019      Not specified 10/15/2009     BONE DENSITY Next Due 11/29/2021      Done 11/29/2016 DS-BONE DENSITY STUDY (DEXA)     Patient has more history with this topic...          Patient Care Team:  Mireille Benito M.D. as PCP - General (Family Medicine)  Matilde Yi M.D. as Consulting Physician (Pulmonary Medicine)   [facility] (DME Supplier)  Arsen Mendoza O.D. as Consulting Physician (Optometry)    Social History   Substance Use Topics   • Smoking  status: Current Every Day Smoker -- 0.25 packs/day for 40 years     Types: Cigarettes   • Smokeless tobacco: Never Used      Comment: Down to 5 cigs a day per patient  Started in her 30s   • Alcohol Use: No     Family History   Problem Relation Age of Onset   • Heart Disease Mother      aortic stenosis   • Heart Disease Father    • Cancer Brother      She  has a past medical history of Hypertension; COPD; Hyperlipidemia; HTN (hypertension) (7/11/2011); COPD (chronic obstructive pulmonary disease) (CMS-HCC) (7/11/2011); Anxiety disorder (7/11/2011); Depression; Chickenpox; French measles; Influenza; Mumps; Smallpox; and Macular degeneration (02/17).   Past Surgical History   Procedure Laterality Date   • Femoral popliteal angioplasty with stent     • Cholecystectomy     • Abdominal hysterectomy total     • Hysterectomy laparoscopy     • Cardiovascular       cardio ablation           Exam:     There were no vitals taken for this visit. There is no weight on file to calculate BMI.    Hearing good.    Dentition fair  Alert, oriented in no acute distress.  Eye contact is good, speech goal directed, affect calm  Neck-supple, no lymphadenopathy, no thyromegaly  Lungs-diminished breath sounds all lung fields, no rales, no wheezes  Heart-regular rate and rhythm, no murmur  Extremities-no edema, clubbing, cyanosis    Assessment and Plan. The following treatment and monitoring plan is recommended:      1. Medicare annual wellness visit, subsequent  Up-to-date with all his shots except for tdap and this was given today.    2. Chronic obstructive pulmonary disease, unspecified COPD type (CMS-HCC)  Currently compensated. Continue 24 oxygen, inhalers and follow-up with pulmonologist.    3. Chronic respiratory failure with hypoxia (CMS-HCC)  Same as #2.    4. Hyperlipidemia, unspecified hyperlipidemia type  Continue simvastatin. She will do follow-up blood work prior to her next visit in August.    5. Essential  hypertension  Controlled on her medications.    6. Osteoporosis, post-menopausal  Continue Fosamax until February 2018. Continue vitamin D supplementation.    7. Anxiety  She is on BuSpar on as needed basis and Klonopin 0.5 mg twice daily regularly with good control of her problem.    8. Peripheral vascular disease (CMS-HCC)  Status post femoropopliteal bypass surgery in 2009 and currently asymptomatic. She continues to smoke in spite of her history of PAD. She is not motivated to quit at this time. She is on cholesterol medication.    9. Need for Tdap vaccination  Tdap was given.        Services needed: No services needed at this time  Health Care Screening recommendations as per orders if indicated.  Referrals offered: PT/OT/Nutrition counseling/Behavioral Health/Smoking cessation as per orders if indicated.    Discussion today about general wellness and lifestyle habits:    · Prevent falls and reduce trip hazards; Cautioned about securing or removing rugs.  · Have a working fire alarm and carbon monoxide detector;   · Engage in regular physical activity and social activities   ·       Follow-up: Keep appointment in August.      Please note that this dictation was created using voice recognition software. I have worked with consultants from the vendor as well as technical experts from [employer] to optimize the interface. I have made every reasonable attempt to correct obvious errors, but I expect that there are errors of grammar and possibly content I did not discover before finalizing the note.

## 2017-03-02 NOTE — MR AVS SNAPSHOT
"        Yoko Mistry   3/2/2017 10:00 AM   Office Visit   MRN: [id number]    Department:  [facility]   Dept Phone:  [phone]    Description:  Female : 1940   Provider:  Mireille Benito M.D.; [facility]            Reason for Visit     Annual Wellness Visit           Allergies as of 3/2/2017     Allergen Noted Reactions    Celexa [Citalopram Hydrobromide] 2014       Low sodium      You were diagnosed with     Medicare annual wellness visit, subsequent   [164536]       Chronic obstructive pulmonary disease, unspecified COPD type (CMS-[facility])   [1333647]       Chronic respiratory failure with hypoxia (CMS-HCC)   [074727]       Hyperlipidemia, unspecified hyperlipidemia type   [8114916]       Essential hypertension   [4467329]       Osteoporosis, post-menopausal   [716477]       Anxiety   [794713]       Peripheral vascular disease (CMS-HCC)   [841424]       Need for Tdap vaccination   [149781]         Vital Signs     Blood Pressure Pulse Temperature Respirations Height Weight    120/70 mmHg 70 37.8 °C (100 °F) 16 1.676 m (5' 5.98\") 52.3 kg (115 lb 4.8 oz)    Body Mass Index Oxygen Saturation Smoking Status             18.62 kg/m2 97% Current Every Day Smoker         Basic Information     Date Of Birth Sex Race Ethnicity Preferred Language    1940 Female White Non- English      Your appointments     2017 11:00 AM   Established Patient Pul with RICK Romeo   [facility] (--)    [address]  [address]  [facility] [address] [phone]            Aug 17, 2017 10:20 AM   Established Patient with Mireille Benito M.D.   [facility] (--)    [address]  [address]  [facility] [address] [phone]           You will be receiving a confirmation call a few days before your appointment from our automated call confirmation system.              Problem List              ICD-10-CM Priority Class Noted - Resolved   " Hyperlipidemia (Chronic) E78.5   7/11/2011 - Present    HTN (hypertension) (Chronic) I10   7/11/2011 - Present    COPD (chronic obstructive pulmonary disease) (HCC) (Chronic) J44.9   7/11/2011 - Present    Anxiety disorder (Chronic) F41.9   7/11/2011 - Present    Current smoker (Chronic) F17.200   3/13/2012 - Present    Osteoporosis, post-menopausal M81.0   11/11/2013 - Present    Peripheral vascular disease (CMS-HCC) (Chronic) I73.9 High  4/27/2015 - Present    Chronic respiratory failure (CMS-HCC) J96.10   4/28/2015 - Present    Microalbuminuria R80.9   4/28/2015 - Present    Intractable vomiting with nausea R11.2 High  11/11/2016 - Present    Hypokalemia E87.6   11/11/2016 - Present    Essential hypertension (Chronic) I10   11/15/2016 - Present    Chronic respiratory failure with hypoxia (CMS-HCC) J96.11   2/14/2017 - Present      Health Maintenance        Date Due Completion Dates    IMM DTaP/Tdap/Td Vaccine (1 - Tdap) 2/11/1959 ---    MAMMOGRAM 2/10/2017 2/10/2016, 11/4/2013, 10/29/2013, 10/29/2013    COLONOSCOPY 10/15/2019 10/15/2009 (N/S)    Override on 10/15/2009: (N/S)    BONE DENSITY 11/29/2021 11/29/2016, 11/4/2013            Current Immunizations     13-VALENT PCV PREVNAR 4/28/2015 12:15 PM    Influenza TIV (IM) 10/13/2014, 9/29/2012, 10/5/2011 11:20 AM    Influenza Vaccine Adult HD 8/25/2016, 9/25/2015    Pneumococcal polysaccharide vaccine (PPSV-23) 10/5/2011 11:15 AM    SHINGLES VACCINE 4/28/2015 12:15 PM    Tdap Vaccine 3/2/2017      Below and/or attached are the medications your provider expects you to take. Review all of your home medications and newly ordered medications with your provider and/or pharmacist. Follow medication instructions as directed by your provider and/or pharmacist. Please keep your medication list with you and share with your provider. Update the information when medications are discontinued, doses are changed, or new medications (including over-the-counter products) are  added; and carry medication information at all times in the event of emergency situations     Allergies:  CELEXA - (reactions not documented)               Medications  Valid as of: March 02, 2017 - 11:21 AM    Generic Name Brand Name Tablet Size Instructions for use    Albuterol Sulfate (Aero Soln) albuterol 108 (90 BASE) MCG/ACT Inhale 2 Puffs by mouth every 6 hours as needed for Shortness of Breath.        Albuterol Sulfate (Aero Soln) albuterol 108 (90 BASE) MCG/ACT Inhale 2 Puffs by mouth every 6 hours as needed for Shortness of Breath.        Alendronate Sodium (Tab) FOSAMAX 70 MG Take 1 Tab by mouth every Saturday.        Aspirin (Tablet Delayed Response) ECOTRIN 81 MG Take 81 mg by mouth every day.        Budesonide-Formoterol Fumarate (Aerosol) SYMBICORT 160-4.5 MCG/ACT Inhale 2 Puffs by mouth 2 Times a Day. Use spacer. Rinse mouth after each use.        Budesonide-Formoterol Fumarate (Aerosol) SYMBICORT 160-4.5 MCG/ACT Inhale 2 Puffs by mouth 2 Times a Day.        BusPIRone HCl (Tab) BUSPAR 5 MG Take 5 mg by mouth every day.        Cholecalciferol (Tab) cholecalciferol 1000 UNIT Take 1,000 Units by mouth every day.        ClonazePAM (Tab) KLONOPIN 0.5 MG TAKE 1 TABLET BY MOUTH 2 TIMES A DAY        Garlic   Take 1 tablet by mouth every day.        Ipratropium-Albuterol (Solution) DUONEB 0.5-2.5 (3) MG/3ML 3 mL by Nebulization route 4 times a day.        Lisinopril (Tab) PRINIVIL 20 MG Take 20 mg by mouth every day.        Metoprolol Succinate (TABLET SR 24 HR) TOPROL XL 50 MG Take 50 mg by mouth every day.        Ondansetron (TABLET DISPERSIBLE) ZOFRAN ODT 4 MG Take 1 Tab by mouth every 8 hours as needed for Nausea/Vomiting.        Simvastatin (Tab) ZOCOR 20 MG Take 20 mg by mouth every evening.        .                 Medicines prescribed today were sent to:     [facility]/PHARMACY #[id number] - [place], [place] - [address]    [address] [place] [address]    Phone: [phone] Fax: [phone]    Open 24 Hours?: No     [facility]    [address] [facility] [address] [place] [address]    Phone: [phone] Fax: [phone]      Medication refill instructions:       If your prescription bottle indicates you have medication refills left, it is not necessary to call your provider’s office. Please contact your pharmacy and they will refill your medication.    If your prescription bottle indicates you do not have any refills left, you may request refills at any time through one of the following ways: The online [employer] system (except Urgent Care), by calling your provider’s office, or by asking your pharmacy to contact your provider’s office with a refill request. Medication refills are processed only during regular business hours and may not be available until the next business day. Your provider may request additional information or to have a follow-up visit with you prior to refilling your medication.   *Please Note: Medication refills are assigned a new Rx number when refilled electronically. Your pharmacy may indicate that no refills were authorized even though a new prescription for the same medication is available at the pharmacy. Please request the medicine by name with the pharmacy before contacting your provider for a refill.           Equ[employer] Access Code: Activation code not generated  Current Equ[employer] Status: Active

## 2017-05-10 NOTE — TELEPHONE ENCOUNTER
Was the patient seen in the last year in this department? Yes   Last appt 3/2/17    Does patient have an active prescription for medications requested? No   Last filled 4/11/17    Received Request Via: Pharmacy

## 2017-05-15 NOTE — TELEPHONE ENCOUNTER
Pt needs a new script per the pharmacy     Was the patient seen in the last year in this department? Yes     Does patient have an active prescription for medications requested? No     Received Request Via: Pharmacy

## 2017-06-07 PROBLEM — I73.9 CLAUDICATION (HCC): Status: ACTIVE | Noted: 2017-01-01

## 2017-06-07 NOTE — MR AVS SNAPSHOT
"        Yoko Mistry   2017 2:40 PM   Office Visit   MRN: [id number]    Department:  [facility]   Dept Phone:  [phone]    Description:  Female : 1940   Provider:  Valeri Aguilar M.D.           Reason for Visit     Medical Clearance Blood Pressure stability to be able to get her teeth.      Allergies as of 2017     Allergen Noted Reactions    Celexa [Citalopram Hydrobromide] 2014       Low sodium      You were diagnosed with     Essential hypertension   [2735001]       Claudication (CMS-HCC)   [329533]       Chronic respiratory failure with hypoxia (CMS-HCC)   [586931]       Current smoker   [003327]       Peripheral vascular disease (CMS-HCC)   [292183]         Vital Signs     Blood Pressure Pulse Temperature Height Weight Body Mass Index    130/80 mmHg 66 36.7 °C (98 °F) 1.651 m (5' 5\") 52.617 kg (116 lb) 19.30 kg/m2    Oxygen Saturation Breastfeeding? Smoking Status             95% No Current Every Day Smoker         Basic Information     Date Of Birth Sex Race Ethnicity Preferred Language    1940 Female White Non- English      Your appointments     2017 11:00 AM   Established Patient Pul with RICK Romeo   [facility] (--)    [address] [place] [address] [address]  [facility] [address] [phone]            Aug 17, 2017 10:20 AM   Established Patient with Mireille Benito M.D.   [facility] (--)    [address] [facility]  [address]  [facility] [address] [phone]           You will be receiving a confirmation call a few days before your appointment from our automated call confirmation system.              Problem List              ICD-10-CM Priority Class Noted - Resolved    Hyperlipidemia (Chronic) E78.5   2011 - Present    HTN (hypertension) (Chronic) I10   2011 - Present    COPD (chronic obstructive pulmonary disease) (HCC) (Chronic) J44.9   2011 - Present    Anxiety disorder (Chronic) F41.9   2011 - Present  "    Current smoker (Chronic) F17.200   3/13/2012 - Present    Osteoporosis, post-menopausal M81.0   11/11/2013 - Present    Peripheral vascular disease (CMS-HCC) (Chronic) I73.9 High  4/27/2015 - Present    Chronic respiratory failure (CMS-HCC) J96.10   4/28/2015 - Present    Microalbuminuria R80.9   4/28/2015 - Present    Intractable vomiting with nausea R11.2 High  11/11/2016 - Present    Hypokalemia E87.6   11/11/2016 - Present    Chronic respiratory failure with hypoxia (CMS-HCC) J96.11   2/14/2017 - Present      Health Maintenance        Date Due Completion Dates    MAMMOGRAM 2/10/2017 2/10/2016, 11/4/2013    COLONOSCOPY 10/15/2019 10/15/2009 (N/S)    Override on 10/15/2009: (N/S)    BONE DENSITY 11/29/2021 11/29/2016, 11/4/2013    IMM DTaP/Tdap/Td Vaccine (2 - Td) 3/2/2027 3/2/2017            Current Immunizations     13-VALENT PCV PREVNAR 4/28/2015 12:15 PM    Influenza TIV (IM) 10/13/2014, 9/29/2012, 10/5/2011 11:20 AM    Influenza Vaccine Adult HD 8/25/2016, 9/25/2015    Pneumococcal polysaccharide vaccine (PPSV-23) 10/5/2011 11:15 AM    SHINGLES VACCINE 4/28/2015 12:15 PM    Tdap Vaccine 3/2/2017      Below and/or attached are the medications your provider expects you to take. Review all of your home medications and newly ordered medications with your provider and/or pharmacist. Follow medication instructions as directed by your provider and/or pharmacist. Please keep your medication list with you and share with your provider. Update the information when medications are discontinued, doses are changed, or new medications (including over-the-counter products) are added; and carry medication information at all times in the event of emergency situations     Allergies:  CELEXA - (reactions not documented)               Medications  Valid as of: June 07, 2017 -  3:31 PM    Generic Name Brand Name Tablet Size Instructions for use    Albuterol Sulfate (Aero Soln) albuterol 108 (90 BASE) MCG/ACT Inhale 2 Puffs by  mouth every 6 hours as needed for Shortness of Breath.        Alendronate Sodium (Tab) FOSAMAX 70 MG TAKE 1 TABLET BY MOUTH EVERY SATURDAY.        Aspirin (Tablet Delayed Response) ECOTRIN 81 MG Take 81 mg by mouth every day.        Budesonide-Formoterol Fumarate (Aerosol) SYMBICORT 160-4.5 MCG/ACT Inhale 2 Puffs by mouth 2 Times a Day.        BusPIRone HCl (Tab) BUSPAR 5 MG Take 5 mg by mouth every day.        Cholecalciferol (Tab) cholecalciferol 1000 UNIT Take 1,000 Units by mouth every day.        ClonazePAM (Tab) KLONOPIN 1 MG TAKE 1 TABLET BY MOUTH 2 TIMES A DAY        Garlic   Take 1 tablet by mouth every day.        Ipratropium-Albuterol (Solution) DUONEB 0.5-2.5 (3) MG/3ML 3 mL by Nebulization route 4 times a day.        Lisinopril (Tab) PRINIVIL 20 MG Take 20 mg by mouth every day.        Lisinopril (Tab) PRINIVIL 20 MG TAKE 1 TABLET BY MOUTH 2 TIMES A DAY        Metoprolol Succinate (TABLET SR 24 HR) TOPROL XL 50 MG Take 50 mg by mouth every day.        Ondansetron (TABLET DISPERSIBLE) ZOFRAN ODT 4 MG Take 1 Tab by mouth every 8 hours as needed for Nausea/Vomiting.        Simvastatin (Tab) ZOCOR 20 MG Take 20 mg by mouth every evening.        .                 Medicines prescribed today were sent to:     [facility]/PHARMACY #[id number] - [place], [place] - [address]    [address] [address]    Phone: [phone] Fax: [phone]    Open 24 Hours?: No    [facility]    [address] [facility] [address] [facility] [address]    Phone: [phone] Fax: [phone]      Medication refill instructions:       If your prescription bottle indicates you have medication refills left, it is not necessary to call your provider’s office. Please contact your pharmacy and they will refill your medication.    If your prescription bottle indicates you do not have any refills left, you may request refills at any time through one of the following ways: The online [employer] system (except Urgent Care), by calling your provider’s office,  or by asking your pharmacy to contact your provider’s office with a refill request. Medication refills are processed only during regular business hours and may not be available until the next business day. Your provider may request additional information or to have a follow-up visit with you prior to refilling your medication.   *Please Note: Medication refills are assigned a new Rx number when refilled electronically. Your pharmacy may indicate that no refills were authorized even though a new prescription for the same medication is available at the pharmacy. Please request the medicine by name with the pharmacy before contacting your provider for a refill.           MyChart Access Code: Activation code not generated  Current MyChart Status: Active          Quit Tobacco Information     Do you want to quit using tobacco?    Quitting tobacco decreases risks of cancer, heart and lung disease, increases life expectancy, improves sense of taste and smell, and increases spending money, among other benefits.    If you are thinking about quitting, we can help.  • [facility] Quit Tobacco Program: [phone]  o Program occurs weekly for four weeks and includes pharmacist consultation on products to support quitting smoking or chewing tobacco. A provider referral is needed for pharmacist consultation.  • Tobacco Users Help Hotline: 0-038-QUIT-NOW (500-9653) or https://[place].quitlogix.org/  o Free, confidential telephone and online coaching for [place] residents. Sessions are designed on a schedule that is convenient for you. Eligible clients receive free nicotine replacement therapy.  • Nationally: www.smokefree.gov  o Information and professional assistance to support both immediate and long-term needs as you become, and remain, a non-smoker. Smokefree.gov allows you to choose the help that best fits your needs.

## 2017-06-07 NOTE — PROGRESS NOTES
Subjective:   Yoko Mistry is a 77 y.o. female here today for surgical clearance, hypertension evaluation     HTN (hypertension)  She was at the dentist office and her blood pressure was 190/80 over there. She tells me that they used blood pressure cuf machine around her wrist. She does not check blood pressure at home. She denies headache, blurry vision, chest pain, palpitations, leg swellings. She never had BP elevation in the past. She is on lisinopril 20 mg twice a day and metoprolol SR 50 mg daily. She is going for tooth extraction and therefore she is here asking for clearance. Pulses equal. Blood pressure well controled today. She is going for local anesthesia for teeth extraction     Peripheral vascular disease (CMS-HCC)  [facility] 2015. Stable. Treated with ASA.    History of Femoral popliteal angioplasty with stent. Patient presently on ASA 81 mg and Simvastatin 20 mg daily.    She tells me that sometime she still has claudication especially when she walks for long periods. She is on asa. Follow up with vascular surgeon     Current smoker  Not willing on quitting.    Chronic respiratory failure with hypoxia (CMS-HCC)  No sings of COPD exacerbation. Patient is on chronic oxygen. Pulse oximetry 94%. Patient is stable       Paper work filled out     Current medicines (including changes today)  Current Outpatient Prescriptions   Medication Sig Dispense Refill   • lisinopril (PRINIVIL) 20 MG Tab TAKE 1 TABLET BY MOUTH 2 TIMES A  Tab 1   • albuterol (VENTOLIN HFA) 108 (90 BASE) MCG/ACT Aero Soln inhalation aerosol Inhale 2 Puffs by mouth every 6 hours as needed for Shortness of Breath. 1 Inhaler 2   • clonazepam (KLONOPIN) 1 MG Tab TAKE 1 TABLET BY MOUTH 2 TIMES A DAY 60 Tab 0   • alendronate (FOSAMAX) 70 MG Tab TAKE 1 TABLET BY MOUTH EVERY SATURDAY. 4 Tab 5   • ipratropium-albuterol (DUONEB) 0.5-2.5 (3) MG/3ML nebulizer solution 3 mL by Nebulization route 4 times a day. 75 mL 11   • vitamin D  (CHOLECALCIFEROL) 1000 UNIT Tab Take 1,000 Units by mouth every day.     • GARLIC PO Take 1 tablet by mouth every day.     • ondansetron (ZOFRAN ODT) 4 MG TABLET DISPERSIBLE Take 1 Tab by mouth every 8 hours as needed for Nausea/Vomiting. 20 Tab 0   • busPIRone (BUSPAR) 5 MG Tab Take 5 mg by mouth every day.     • lisinopril (PRINIVIL) 20 MG Tab Take 20 mg by mouth every day.     • metoprolol SR (TOPROL XL) 50 MG TABLET SR 24 HR Take 50 mg by mouth every day.     • simvastatin (ZOCOR) 20 MG Tab Take 20 mg by mouth every evening.     • aspirin EC (ECOTRIN) 81 MG TBEC Take 81 mg by mouth every day.     • budesonide-formoterol (SYMBICORT) 160-4.5 MCG/ACT Aerosol Inhale 2 Puffs by mouth 2 Times a Day.       No current facility-administered medications for this visit.     She  has a past medical history of Hypertension; COPD; Hyperlipidemia; HTN (hypertension) (7/11/2011); COPD (chronic obstructive pulmonary disease) (CMS-[facility]) (7/11/2011); Anxiety disorder (7/11/2011); Depression; Chickenpox; Polish measles; Influenza; Mumps; Smallpox; and Macular degeneration (02/17).    Current Outpatient Prescriptions   Medication Sig Dispense Refill   • lisinopril (PRINIVIL) 20 MG Tab TAKE 1 TABLET BY MOUTH 2 TIMES A  Tab 1   • albuterol (VENTOLIN HFA) 108 (90 BASE) MCG/ACT Aero Soln inhalation aerosol Inhale 2 Puffs by mouth every 6 hours as needed for Shortness of Breath. 1 Inhaler 2   • clonazepam (KLONOPIN) 1 MG Tab TAKE 1 TABLET BY MOUTH 2 TIMES A DAY 60 Tab 0   • alendronate (FOSAMAX) 70 MG Tab TAKE 1 TABLET BY MOUTH EVERY SATURDAY. 4 Tab 5   • ipratropium-albuterol (DUONEB) 0.5-2.5 (3) MG/3ML nebulizer solution 3 mL by Nebulization route 4 times a day. 75 mL 11   • vitamin D (CHOLECALCIFEROL) 1000 UNIT Tab Take 1,000 Units by mouth every day.     • GARLIC PO Take 1 tablet by mouth every day.     • ondansetron (ZOFRAN ODT) 4 MG TABLET DISPERSIBLE Take 1 Tab by mouth every 8 hours as needed for Nausea/Vomiting. 20 Tab 0  "  • busPIRone (BUSPAR) 5 MG Tab Take 5 mg by mouth every day.     • lisinopril (PRINIVIL) 20 MG Tab Take 20 mg by mouth every day.     • metoprolol SR (TOPROL XL) 50 MG TABLET SR 24 HR Take 50 mg by mouth every day.     • simvastatin (ZOCOR) 20 MG Tab Take 20 mg by mouth every evening.     • aspirin EC (ECOTRIN) 81 MG TBEC Take 81 mg by mouth every day.     • budesonide-formoterol (SYMBICORT) 160-4.5 MCG/ACT Aerosol Inhale 2 Puffs by mouth 2 Times a Day.       No current facility-administered medications for this visit.       Allergies as of 06/07/2017 - Geovanny as Reviewed 06/07/2017   Allergen Reaction Noted   • Celexa [citalopram hydrobromide]  04/28/2014       Social History     Social History   • Marital Status: Single     Spouse Name: N/A   • Number of Children: N/A   • Years of Education: N/A     Occupational History   • Not on file.     Social History Main Topics   • Smoking status: Current Every Day Smoker -- 0.25 packs/day for 40 years     Types: Cigarettes   • Smokeless tobacco: Never Used      Comment: Down to 5 cigs a day per patient  Started in her 30s   • Alcohol Use: No   • Drug Use: No   • Sexual Activity: Not on file     Other Topics Concern   • Not on file     Social History Narrative        Family History   Problem Relation Age of Onset   • Heart Disease Mother      aortic stenosis   • Heart Disease Father    • Cancer Brother        Past Surgical History   Procedure Laterality Date   • Femoral popliteal angioplasty with stent  2009   • Cholecystectomy     • Abdominal hysterectomy total     • Hysterectomy laparoscopy     • Cardiovascular       cardio ablation       ROS   No chest pain, no shortness of breath, no abdominal pain       Objective:     Blood pressure 130/80, pulse 66, temperature 36.7 °C (98 °F), height 1.651 m (5' 5\"), weight 52.617 kg (116 lb), SpO2 95 %, not currently breastfeeding. Body mass index is 19.3 kg/(m^2).   Physical Exam:  Constitutional: Alert, no distress, on oxygen, frail " .  Skin: Warm, dry, good turgor, no rashes in visible areas.  Eye: Equal, round and reactive, conjunctiva clear, lids normal.  ENMT: Lips without lesions, only few teeth left, with severe caries, oropharynx clear.  Neck: Trachea midline, no masses, no thyromegaly. No cervical or supraclavicular lymphadenopathy  Respiratory: Unlabored respiratory effort, lungs clear to auscultation, no wheezes, no ronchi.  Cardiovascular: Normal S1, S2, no murmur, no edema.  Abdomen: Soft, non-tender, no masses, no hepatosplenomegaly.  Psych: Alert and oriented x3, normal affect and mood.        Assessment and Plan:   The following treatment plan was discussed    1. Essential hypertension  Well controled in the office. Not sure why high reading at the dentist office, possible due to type of cuff machine that was used, pulses equal on both hands, she has good peripheral pulses. She follows with vascular surgeon for peripheral vascular disease .  Continue same treatment  Paper-work filled out     2. Claudication (CMS-HCC)  Follow up with vascular surgeon     3. Chronic respiratory failure with hypoxia (CMS-HCC)  Stable. Continue same treatment     4. Current smoker  Not willing on cessation     5. Peripheral vascular disease (CMS-HCC)  Continue asa, follow up with vascular surgeon       Followup: Return if symptoms worsen or fail to improve, for Short.

## 2017-06-07 NOTE — ASSESSMENT & PLAN NOTE
She was at the dentist office and her blood pressure was 190/80 over there. She tells me that they used blood pressure cuf machine around her wrist. She does not check blood pressure at home. She denies headache, blurry vision, chest pain, palpitations, leg swellings. She never had BP elevation in the past. She is on lisinopril 20 mg twice a day and metoprolol SR 50 mg daily. She is going for tooth extraction and therefore she is here asking for clearance. Pulses equal. Blood pressure well controled today. She is going for local anesthesia for teeth extraction

## 2017-06-07 NOTE — ASSESSMENT & PLAN NOTE
[facility] 2015. Stable. Treated with ASA.    History of Femoral popliteal angioplasty with stent. Patient presently on ASA 81 mg and Simvastatin 20 mg daily.    She tells me that sometime she still has claudication especially when she walks for long periods. She is on asa. Follow up with vascular surgeon

## 2017-06-07 NOTE — ASSESSMENT & PLAN NOTE
No sings of COPD exacerbation. Patient is on chronic oxygen. Pulse oximetry 94%. Patient is stable

## 2017-06-28 PROBLEM — Z99.81 OXYGEN DEPENDENT: Status: ACTIVE | Noted: 2017-01-01

## 2017-06-28 NOTE — PATIENT INSTRUCTIONS
1. Add Ponaris for nasal dryness/congestion.  2. Continue Symbicort 2 puffs twice daily and rescue inhaler as needed. Use nebulizer first thing in AM to open up airway and repeat again in afternoon if needed.

## 2017-06-28 NOTE — MR AVS SNAPSHOT
"        Yoko Mistry   2017 11:00 AM   Office Visit   MRN: [id number]    Department:  [facility]   Dept Phone:  [phone]    Description:  Female : 1940   Provider:  RICK Romeo           Reason for Visit     Follow-Up           Allergies as of 2017     Allergen Noted Reactions    Celexa [Citalopram Hydrobromide] 2014       Low sodium      You were diagnosed with     Chronic obstructive pulmonary disease, unspecified COPD type (CMS-HCC)   [1176039]       Chronic respiratory failure with hypoxia (CMS-HCC)   [054441]       Current smoker   [080141]       Essential hypertension   [4034538]       Oxygen dependent   [995966]         Vital Signs     Blood Pressure Pulse Temperature Respirations Height Weight    182/100 mmHg 81 36.2 °C (97.2 °F) 16 1.651 m (5' 5\") 51.256 kg (113 lb)    Body Mass Index Oxygen Saturation Smoking Status             18.80 kg/m2 95% Current Every Day Smoker         Basic Information     Date Of Birth Sex Race Ethnicity Preferred Language    1940 Female White Non- English      Your appointments     Aug 17, 2017 10:20 AM   Established Patient with Mireille Benito M.D.   [facility] (--)    [address]  [address]  [facility] [address] [phone]           You will be receiving a confirmation call a few days before your appointment from our automated call confirmation system.            2017  9:40 AM   Established Patient Pul with Matilde Yi M.D.   [facility] (--)    [address] [place] [address] [address]  [place] [address] [phone]              Problem List              ICD-10-CM Priority Class Noted - Resolved    Hyperlipidemia (Chronic) E78.5   2011 - Present    HTN (hypertension) (Chronic) I10   2011 - Present    COPD (chronic obstructive pulmonary disease) (HCC) (Chronic) J44.9   2011 - Present    Anxiety disorder (Chronic) F41.9   2011 - Present    Current " smoker (Chronic) F17.200   3/13/2012 - Present    Osteoporosis, post-menopausal M81.0   11/11/2013 - Present    Peripheral vascular disease (CMS-HCC) (Chronic) I73.9 High  4/27/2015 - Present    Chronic respiratory failure (CMS-HCC) J96.10   4/28/2015 - Present    Microalbuminuria R80.9   4/28/2015 - Present    Hypokalemia E87.6   11/11/2016 - Present    Chronic respiratory failure with hypoxia (CMS-HCC) J96.11   2/14/2017 - Present    Oxygen dependent Z99.81   6/28/2017 - Present      Health Maintenance        Date Due Completion Dates    MAMMOGRAM 2/10/2017 2/10/2016, 11/4/2013    COLONOSCOPY 10/15/2019 10/15/2009 (N/S)    Override on 10/15/2009: (N/S)    BONE DENSITY 11/29/2021 11/29/2016, 11/4/2013    IMM DTaP/Tdap/Td Vaccine (2 - Td) 3/2/2027 3/2/2017            Results     AMB MULTIPLE OXIMETRY                   Current Immunizations     13-VALENT PCV PREVNAR 4/28/2015 12:15 PM    Influenza TIV (IM) 10/13/2014, 9/29/2012, 10/5/2011 11:20 AM    Influenza Vaccine Adult HD 8/25/2016, 9/25/2015    Pneumococcal polysaccharide vaccine (PPSV-23) 10/5/2011 11:15 AM    SHINGLES VACCINE 4/28/2015 12:15 PM    Tdap Vaccine 3/2/2017      Below and/or attached are the medications your provider expects you to take. Review all of your home medications and newly ordered medications with your provider and/or pharmacist. Follow medication instructions as directed by your provider and/or pharmacist. Please keep your medication list with you and share with your provider. Update the information when medications are discontinued, doses are changed, or new medications (including over-the-counter products) are added; and carry medication information at all times in the event of emergency situations     Allergies:  CELEXA - (reactions not documented)               Medications  Valid as of: June 28, 2017 - 12:01 PM    Generic Name Brand Name Tablet Size Instructions for use    Albuterol Sulfate (Aero Soln) albuterol 108 (90 BASE)  MCG/ACT Inhale 2 Puffs by mouth every 6 hours as needed for Shortness of Breath.        Alendronate Sodium (Tab) FOSAMAX 70 MG TAKE 1 TABLET BY MOUTH EVERY SATURDAY.        Aspirin (Tablet Delayed Response) ECOTRIN 81 MG Take 81 mg by mouth every day.        Budesonide-Formoterol Fumarate (Aerosol) SYMBICORT 160-4.5 MCG/ACT Inhale 2 Puffs by mouth 2 Times a Day.        BusPIRone HCl (Tab) BUSPAR 5 MG Take 5 mg by mouth every day.        Cholecalciferol (Tab) cholecalciferol 1000 UNIT Take 1,000 Units by mouth every day.        ClonazePAM (Tab) KLONOPIN 1 MG TAKE 1 TABLET BY MOUTH 2 TIMES A DAY        Garlic   Take 1 tablet by mouth every day.        Ipratropium-Albuterol (Solution) DUONEB 0.5-2.5 (3) MG/3ML 3 mL by Nebulization route 4 times a day.        Lisinopril (Tab) PRINIVIL 20 MG Take 20 mg by mouth every day.        Lisinopril (Tab) PRINIVIL 20 MG TAKE 1 TABLET BY MOUTH 2 TIMES A DAY        Metoprolol Succinate (TABLET SR 24 HR) TOPROL XL 50 MG Take 50 mg by mouth every day.        Metoprolol Succinate (TABLET SR 24 HR) TOPROL XL 50 MG TAKE 1 TABLET BY MOUTH DAILY        Ondansetron (TABLET DISPERSIBLE) ZOFRAN ODT 4 MG Take 1 Tab by mouth every 8 hours as needed for Nausea/Vomiting.        Simvastatin (Tab) ZOCOR 20 MG Take 20 mg by mouth every evening.        .                 Medicines prescribed today were sent to:     [facility]/PHARMACY #[id number] - [place], [place] - [address]    [address] [address]    Phone: [phone] Fax: [phone]    Open 24 Hours?: No    [facility]    [address] [facility] [place] [facility] [address]    Phone: [phone] Fax: [phone]      Medication refill instructions:       If your prescription bottle indicates you have medication refills left, it is not necessary to call your provider’s office. Please contact your pharmacy and they will refill your medication.    If your prescription bottle indicates you do not have any refills left, you may request refills at any time  through one of the following ways: The online [employer] system (except Urgent Care), by calling your provider’s office, or by asking your pharmacy to contact your provider’s office with a refill request. Medication refills are processed only during regular business hours and may not be available until the next business day. Your provider may request additional information or to have a follow-up visit with you prior to refilling your medication.   *Please Note: Medication refills are assigned a new Rx number when refilled electronically. Your pharmacy may indicate that no refills were authorized even though a new prescription for the same medication is available at the pharmacy. Please request the medicine by name with the pharmacy before contacting your provider for a refill.        Your To Do List     Future Labs/Procedures Complete By Expires    AMB MULTIPLE OXIMETRY  As directed 6/28/2018      Instructions    1. Add Ponaris for nasal dryness/congestion.  2. Continue Symbicort 2 puffs twice daily and rescue inhaler as needed. Use nebulizer first thing in AM to open up airway and repeat again in afternoon if needed.           [employer] Access Code: Activation code not generated  Current [employer] Status: Active          Quit Tobacco Information     Do you want to quit using tobacco?    Quitting tobacco decreases risks of cancer, heart and lung disease, increases life expectancy, improves sense of taste and smell, and increases spending money, among other benefits.    If you are thinking about quitting, we can help.  • [facility] Quit Tobacco Program: 420.207.2837  o Program occurs weekly for four weeks and includes pharmacist consultation on products to support quitting smoking or chewing tobacco. A provider referral is needed for pharmacist consultation.  • Tobacco Users Help Hotline: 6-800-QUIT-NOW (265-1160) or https://[place].quitlogix.org/  o Free, confidential telephone and online coaching for [place] residents. Sessions  are designed on a schedule that is convenient for you. Eligible clients receive free nicotine replacement therapy.  • Nationally: www.smokefree.gov  o Information and professional assistance to support both immediate and long-term needs as you become, and remain, a non-smoker. Smokefree.gov allows you to choose the help that best fits your needs.

## 2017-06-28 NOTE — PROGRESS NOTES
Chief Complaint   Patient presents with   • Follow-Up       HPI:  Yoko Mistry is a 77 y.o. year old female here today for follow-up on Stage 4 COPD. BP is elevated on exam today 182/100 and came down to 142/76 during OV. Advised patient to monitor this.     The patient is an active smoker with 40 pack years to date, and nto interested in quitting - she uses 6 cigarettes daily. She has been O2 dependent over the past 2 years using 2L 24/7. She will continue to benefit from use. Dyspnea has progressed overtime and noticeable performing ADL's but is now stable with bronchodilator use.  Pulmonary function testing confirms stage IV COPD with FEV1.67 L 30%, FEV1/FVC 43%, DLCO 45% predicted. She uses Symbicort 160/4.5mcg 2puffs BID, nebulizer prn and MARYANN up to 4x's daily. We reviewed appropriate inhaler use. She notes nasal dryness/congestion. She denies over the past year.AECOPD. Chest x-ray November 2016 showed no acute cardiopulmonary process. BMI 18.    She is up-to-date on influenza and pneumococcal vaccines. She has a family history of emphysema in her father, and brother with lung cancer; both were smokers.    She denies productive cough or wheezing. She notes dyspnea with over exerting herself. She is currently spring cleaning and notes occasional sneeze. She denies fever, chills, night sweats, chest pain, ankle swelling or hemoptysis..     She needs to switch DME's due to insurance. Multi ox in office today.    ROS: As per HPI and otherwise negative if not stated.    Past Medical History   Diagnosis Date   • Hypertension    • COPD    • Hyperlipidemia    • HTN (hypertension) 7/11/2011   • COPD (chronic obstructive pulmonary disease) (CMS-[facility]) 7/11/2011   • Anxiety disorder 7/11/2011   • Depression    • Chickenpox    • Maori measles    • Influenza    • Mumps    • Smallpox    • Macular degeneration 02/17       Past Surgical History   Procedure Laterality Date   • Femoral popliteal angioplasty with stent  2009   •  "Cholecystectomy     • Abdominal hysterectomy total     • Hysterectomy laparoscopy     • Cardiovascular       cardio ablation       Family History   Problem Relation Age of Onset   • Heart Disease Mother      aortic stenosis   • Heart Disease Father    • Cancer Brother        Social History     Social History   • Marital Status: Single     Spouse Name: N/A   • Number of Children: N/A   • Years of Education: N/A     Occupational History   • Not on file.     Social History Main Topics   • Smoking status: Current Every Day Smoker -- 0.25 packs/day for 40 years     Types: Cigarettes   • Smokeless tobacco: Never Used      Comment: Down to 5 cigs a day per patient  Started in her 30s   • Alcohol Use: No   • Drug Use: No   • Sexual Activity: Not on file     Other Topics Concern   • Not on file     Social History Narrative       Allergies as of 06/28/2017 - Geovanny as Reviewed 06/28/2017   Allergen Reaction Noted   • Celexa [citalopram hydrobromide]  04/28/2014        @Vital signs for this encounter:  Filed Vitals:    06/28/17 1106   Height: 1.651 m (5' 5\")   Weight: 51.256 kg (113 lb)   Weight % change since last entry.: 0 %   BP: 182/100   Pulse: 81   BMI (Calculated): 18.8   Resp: 16   Temp: 36.2 °C (97.2 °F)   O2 sat % on O2: 95 %       Current medications as of today   Current Outpatient Prescriptions   Medication Sig Dispense Refill   • metoprolol SR (TOPROL XL) 50 MG TABLET SR 24 HR TAKE 1 TABLET BY MOUTH DAILY 90 Tab 0   • clonazepam (KLONOPIN) 1 MG Tab TAKE 1 TABLET BY MOUTH 2 TIMES A DAY 60 Tab 0   • lisinopril (PRINIVIL) 20 MG Tab TAKE 1 TABLET BY MOUTH 2 TIMES A  Tab 1   • albuterol (VENTOLIN HFA) 108 (90 BASE) MCG/ACT Aero Soln inhalation aerosol Inhale 2 Puffs by mouth every 6 hours as needed for Shortness of Breath. 1 Inhaler 2   • alendronate (FOSAMAX) 70 MG Tab TAKE 1 TABLET BY MOUTH EVERY SATURDAY. 4 Tab 5   • ipratropium-albuterol (DUONEB) 0.5-2.5 (3) MG/3ML nebulizer solution 3 mL by Nebulization " route 4 times a day. 75 mL 11   • vitamin D (CHOLECALCIFEROL) 1000 UNIT Tab Take 1,000 Units by mouth every day.     • GARLIC PO Take 1 tablet by mouth every day.     • busPIRone (BUSPAR) 5 MG Tab Take 5 mg by mouth every day.     • lisinopril (PRINIVIL) 20 MG Tab Take 20 mg by mouth every day.     • metoprolol SR (TOPROL XL) 50 MG TABLET SR 24 HR Take 50 mg by mouth every day.     • simvastatin (ZOCOR) 20 MG Tab Take 20 mg by mouth every evening.     • aspirin EC (ECOTRIN) 81 MG TBEC Take 81 mg by mouth every day.     • budesonide-formoterol (SYMBICORT) 160-4.5 MCG/ACT Aerosol Inhale 2 Puffs by mouth 2 Times a Day.     • ondansetron (ZOFRAN ODT) 4 MG TABLET DISPERSIBLE Take 1 Tab by mouth every 8 hours as needed for Nausea/Vomiting. 20 Tab 0     No current facility-administered medications for this visit.         Physical Exam:   Gen:           Alert and oriented, No apparent distress. Mood and affect appropriate, normal interaction with examiner.  Eyes:          PERRL, EOM intact, sclere white, conjunctive moist. Glasses.  Ears:          Not examined.   Hearing:     Grossly intact.  Nose:          Normal, no lesions or deformities.  Dentition:    Poor dentition.  Oropharynx:   Tongue normal, posterior pharynx without erythema or exudate.  Mallampati Classification: 2/3  Neck:        Supple, trachea midline, no masses.  Respiratory Effort: No intercostal retractions or use of accessory muscles.   Lung Auscultation:      Significantly diminished t/o; no rales, rhonchi or wheezing.  CV:            Regular rate and rhythm. No murmurs, rubs or gallops.  Abd:           Not examined.   Lymphadenopathy: Not examined.  Gait and Station: Normal.  Digits and Nails: No clubbing, cyanosis, petechiae, or nodes.   Cranial Nerves: II-XII grossly intact.  Skin:        No rashes, lesions or ulcers noted.               Ext:           No cyanosis but trace bilateral pedal edema.      Assessment:  1. Chronic obstructive pulmonary  disease, unspecified COPD type (CMS-HCC)  AMB MULTIPLE OXIMETRY   2. Chronic respiratory failure with hypoxia (CMS-HCC)  AMB MULTIPLE OXIMETRY   3. Current smoker     4. Essential hypertension     5. Oxygen dependent  AMB MULTIPLE OXIMETRY       Immunizations:    Flu:2016  Pneumovax 23:2011  Prevnar 13:2015    Plan:  1. Continue inhaler regimen. Advised to use nebulizer first thing in AM followed by inhalers.  2. DME O2 4L 24/7. Patient will continue to benefit from oxygen use. Patient's BP may be elevated due to not enough oxygen on exertion as well.  3. Smoking cessation strongly encouraged.  4. Patient declines updating CT scan due to continued smoking status.  5. Discussed respiratory hygiene.  6. Add Ponaris for nasal dryness/congestion.  7. Follow up in 4 months, sooner if needed.

## 2017-06-28 NOTE — PROCEDURES
Multi-Ox Readings  Multi Ox #1     O2 sat % at rest 96   O2 sat % on exertion 87   O2 sat average on exertion 86   Multi Ox #2 4 LPM   O2 sat % at rest 90   O2 sat % on exertion 92   O2 sat average on exertion 93     Oxygen Use 2   Oxygen Frequency 24/7   Duration of need     Is the patient mobile within the home?     CPAP Use?     BIPAP Use?     Servo Titration

## 2017-07-05 NOTE — TELEPHONE ENCOUNTER
Spoke with pt and she states that the  suggested she get one due to the amount of O2 the pt is using daily.

## 2017-07-05 NOTE — TELEPHONE ENCOUNTER
Pt called requesting an rx for battery powered portable oxygen. DME-[facility]    Last OV: 6/28/17  Next OV: 11/2/17  COPD/ Oxygen dependent

## 2017-07-05 NOTE — TELEPHONE ENCOUNTER
That is a POC. It looks like she has been recommended 02 at 4 LPM. Does [facility] have a device to meet her needs?

## 2017-08-01 NOTE — TELEPHONE ENCOUNTER
Caller Name: Yoko Mistry                 Call Back Number: [phone] (home)         Patient approves a detailed voicemail message: N\A    Have we ever prescribed this med? Yes.  If yes, what date? 3/1/17    Last OV: 6/28/17- Veronique Fritz     Next OV: 11/2/17-Matilde Kd     DX: Copd     Medications: Symbicort    Current Outpatient Prescriptions   Medication Sig Dispense Refill   • clonazepam (KLONOPIN) 1 MG Tab TAKE 1 TABLET BY MOUTH 2 TIMES A DAY 60 Tab 0   • metoprolol SR (TOPROL XL) 50 MG TABLET SR 24 HR TAKE 1 TABLET BY MOUTH DAILY 90 Tab 0   • lisinopril (PRINIVIL) 20 MG Tab TAKE 1 TABLET BY MOUTH 2 TIMES A  Tab 1   • albuterol (VENTOLIN HFA) 108 (90 BASE) MCG/ACT Aero Soln inhalation aerosol Inhale 2 Puffs by mouth every 6 hours as needed for Shortness of Breath. 1 Inhaler 2   • alendronate (FOSAMAX) 70 MG Tab TAKE 1 TABLET BY MOUTH EVERY SATURDAY. 4 Tab 5   • ipratropium-albuterol (DUONEB) 0.5-2.5 (3) MG/3ML nebulizer solution 3 mL by Nebulization route 4 times a day. 75 mL 11   • budesonide-formoterol (SYMBICORT) 160-4.5 MCG/ACT Aerosol Inhale 2 Puffs by mouth 2 Times a Day.     • vitamin D (CHOLECALCIFEROL) 1000 UNIT Tab Take 1,000 Units by mouth every day.     • GARLIC PO Take 1 tablet by mouth every day.     • ondansetron (ZOFRAN ODT) 4 MG TABLET DISPERSIBLE Take 1 Tab by mouth every 8 hours as needed for Nausea/Vomiting. 20 Tab 0   • busPIRone (BUSPAR) 5 MG Tab Take 5 mg by mouth every day.     • lisinopril (PRINIVIL) 20 MG Tab Take 20 mg by mouth every day.     • metoprolol SR (TOPROL XL) 50 MG TABLET SR 24 HR Take 50 mg by mouth every day.     • simvastatin (ZOCOR) 20 MG Tab Take 20 mg by mouth every evening.     • aspirin EC (ECOTRIN) 81 MG TBEC Take 81 mg by mouth every day.       No current facility-administered medications for this visit.

## 2017-08-12 NOTE — TELEPHONE ENCOUNTER
I was paged re: critical lab value.  Patient has a CO2 (bicarbonate) level of 44.  In light of concurrent mild hypochloremia, I attempted to call patient to advise her of next step.  No response, therefore due to possible medical urgency/emergency, I left message that she is at risk of increasing confusion and possible syncope, as well as the fact that she should stop taking Clonazepam for at least 3days until she gets into see a primary care provider (Dr. Benito, her PCP, or anyone else).  I left ER precautions, otherwise.

## 2017-08-14 NOTE — TELEPHONE ENCOUNTER
From: Yoko Mistry  To: Mireille Benito M.D.  Sent: 8/11/2017 6:50 PM PDT  Subject: Medication Renewal Request    Original authorizing provider: HUNTER Fam would like a refill of the following medications:  clonazepam (KLONOPIN) 1 MG Tab [Mireille Benito M.D.]    Preferred pharmacy: [facility]/PHARMACY #[id number]  [place], [place] - [address]    Comment:

## 2017-08-15 NOTE — TELEPHONE ENCOUNTER
Spoke with patient. She said she is doing fine. She said she decreased the dose of Klonopin 1 mg from 1 mg twice a day to half a tablet at night only since this weekend. She says is doing well with this dose. I will keep her on Klonopin 0.5 mg one tablet at night only. She said she has not been taking the BuSpar since the dose of the Klonopin was increased to 1 mg twice a day. She can restart the BuSpar 5 mg in the morning if needed.

## 2017-08-16 NOTE — TELEPHONE ENCOUNTER
ESTABLISHED PATIENT PRE-VISIT PLANNING     Note: Patient will not be contacted if there is no indication to call.     1.  Reviewed notes from the last few office visits within the medical group: Yes    2.  If any orders were placed at last visit or intended to be done for this visit (i.e. 6 mos follow-up), do we have Results/Consult Notes?        •  Labs - Labs were not ordered at last office visit.       •  Imaging - Imaging was not ordered at last office visit.       •  Referrals - Referral ordered, patient has NOT been seen.    3. Is this appointment scheduled as a Hospital Follow-Up? No    4.  Immunizations were updated in [employer] using [employer]?: Yes       •  Web [employer] Recommendations: FLU and TD    5.  Patient is due for the following Health Maintenance Topics:   Health Maintenance Due   Topic Date Due   • MAMMOGRAM  02/10/2017       - Patient has completed FLU, PNEUMOVAX (PPSV23), PREVNAR (PCV13) , TDAP and ZOSTAVAX (Shingles) Immunization(s) per [employer]. Chart has been updated.    6.  Patient was NOT informed to arrive 15 min prior to their scheduled appointment and bring in their medication bottles.

## 2017-08-17 NOTE — MR AVS SNAPSHOT
"        Yoko Mistry   2017 10:20 AM   Office Visit   MRN: [id number]    Department:  [facility]   Dept Phone:  [phone]    Description:  Female : 1940   Provider:  Mireille Benito M.D.           Reason for Visit     Follow-Up 6 month follow up    Results labs      Allergies as of 2017     Allergen Noted Reactions    Celexa [Citalopram Hydrobromide] 2014       Low sodium      You were diagnosed with     COPD, severe (CMS-[facility])   [716266]       Chronic respiratory failure with hypoxia and hypercapnia (CMS-[facility])   [9958110]       Compensated respiratory acidosis   [254586]       Essential hypertension   [5878323]       Dyslipidemia   [527778]         Vital Signs     Blood Pressure Pulse Temperature Respirations Height Weight    170/80 mmHg 92 37.1 °C (98.7 °F) 18 1.651 m (5' 5\") 50.803 kg (112 lb)    Body Mass Index Oxygen Saturation Breastfeeding? Smoking Status          18.64 kg/m2 95% No Current Every Day Smoker        Basic Information     Date Of Birth Sex Race Ethnicity Preferred Language    1940 Female White Non- English      Your appointments     Sep 18, 2017  3:00 PM   Established Patient with Mireille Benito M.D.   [facility] (--)    [address]  [address]  [place] [address] [phone]           You will be receiving a confirmation call a few days before your appointment from our automated call confirmation system.            2017  9:40 AM   Established Patient Pul with Matilde Yi M.D.   [facility] (--)    [address] [place] [address] [address]  [place] [address] [phone]              Problem List              ICD-10-CM Priority Class Noted - Resolved    Hyperlipidemia (Chronic) E78.5   2011 - Present    HTN (hypertension) (Chronic) I10   2011 - Present    COPD (chronic obstructive pulmonary disease) (HCC) (Chronic) J44.9   2011 - Present    Anxiety disorder (Chronic) F41.9   2011 - Present   " Current smoker (Chronic) F17.200   3/13/2012 - Present    Osteoporosis, post-menopausal M81.0   11/11/2013 - Present    Peripheral vascular disease (CMS-HCC) (Chronic) I73.9 High  4/27/2015 - Present    Chronic respiratory failure (CMS-HCC) J96.10   4/28/2015 - Present    Microalbuminuria R80.9   4/28/2015 - Present    Hypokalemia E87.6   11/11/2016 - Present    Chronic respiratory failure with hypoxia (CMS-HCC) J96.11   2/14/2017 - Present    Oxygen dependent Z99.81   6/28/2017 - Present      Health Maintenance        Date Due Completion Dates    MAMMOGRAM 2/10/2017 2/10/2016, 11/4/2013    IMM INFLUENZA (1) 9/1/2017 8/25/2016, 9/25/2015, 10/13/2014, 9/11/2013, 9/29/2012, 10/5/2011    COLONOSCOPY 10/15/2019 10/15/2009 (N/S)    Override on 10/15/2009: (N/S)    BONE DENSITY 11/29/2021 11/29/2016, 11/4/2013    IMM DTaP/Tdap/Td Vaccine (2 - Td) 3/2/2027 3/2/2017            Current Immunizations     13-VALENT PCV PREVNAR 4/28/2015 12:15 PM    Influenza TIV (IM) 10/13/2014, 9/29/2012, 10/5/2011 11:20 AM    Influenza Vaccine Adult HD 8/25/2016, 9/25/2015, 9/11/2013    Pneumococcal polysaccharide vaccine (PPSV-23) 10/5/2011 11:15 AM    SHINGLES VACCINE 4/28/2015 12:15 PM    Tdap Vaccine 3/2/2017      Below and/or attached are the medications your provider expects you to take. Review all of your home medications and newly ordered medications with your provider and/or pharmacist. Follow medication instructions as directed by your provider and/or pharmacist. Please keep your medication list with you and share with your provider. Update the information when medications are discontinued, doses are changed, or new medications (including over-the-counter products) are added; and carry medication information at all times in the event of emergency situations     Allergies:  CELEXA - (reactions not documented)               Medications  Valid as of: August 17, 2017 - 10:55 AM    Generic Name Brand Name Tablet Size Instructions for  use    Albuterol Sulfate (Aero Soln) albuterol 108 (90 Base) MCG/ACT Inhale 2 Puffs by mouth every 6 hours as needed for Shortness of Breath.        Alendronate Sodium (Tab) FOSAMAX 70 MG TAKE 1 TABLET BY MOUTH EVERY SATURDAY.        Aspirin (Tablet Delayed Response) ECOTRIN 81 MG Take 81 mg by mouth every day.        Budesonide-Formoterol Fumarate (Aerosol) SYMBICORT 160-4.5 MCG/ACT Inhale 2 Puffs by mouth 2 Times a Day.        Budesonide-Formoterol Fumarate (Aerosol) SYMBICORT 160-4.5 MCG/ACT INHALE 2 PUFFS BY MOUTH 2 TIMES A DAY. USE SPACER. RINSE MOUTH AFTER EACH USE.        BusPIRone HCl (Tab) BUSPAR 5 MG Take 1 Tab by mouth every day.        Cholecalciferol (Tab) cholecalciferol 1000 UNIT Take 1,000 Units by mouth every day.        ClonazePAM (Tab) KLONOPIN 1 MG TAKE 1 TABLET BY MOUTH 2 TIMES A DAY        ClonazePAM (Tab) KLONOPIN 0.5 MG Take 0.5 Tabs by mouth every bedtime.        Garlic   Take 1 tablet by mouth every day.        Ipratropium-Albuterol (Solution) DUONEB 0.5-2.5 (3) MG/3ML 3 mL by Nebulization route 4 times a day.        Lisinopril (Tab) PRINIVIL 20 MG Take 20 mg by mouth every day.        Lisinopril (Tab) PRINIVIL 20 MG TAKE 1 TABLET BY MOUTH 2 TIMES A DAY        Metoprolol Succinate (TABLET SR 24 HR) TOPROL XL 50 MG Take 50 mg by mouth every day.        Metoprolol Succinate (TABLET SR 24 HR) TOPROL XL 50 MG TAKE 1 TABLET BY MOUTH DAILY        Ondansetron (TABLET DISPERSIBLE) ZOFRAN ODT 4 MG Take 1 Tab by mouth every 8 hours as needed for Nausea/Vomiting.        Simvastatin (Tab) ZOCOR 20 MG Take 20 mg by mouth every evening.        .                 Medicines prescribed today were sent to:     [facility] #[id number] - [place], [place] - [address]    [address] [place] [address]    Phone: [phone] Fax: [phone]    Open 24 Hours?: No    [facility]    [address] [place] [address]    Phone: [phone] Fax: [phone]      Medication refill instructions:       If your  prescription bottle indicates you have medication refills left, it is not necessary to call your provider’s office. Please contact your pharmacy and they will refill your medication.    If your prescription bottle indicates you do not have any refills left, you may request refills at any time through one of the following ways: The online [employer] system (except Urgent Care), by calling your provider’s office, or by asking your pharmacy to contact your provider’s office with a refill request. Medication refills are processed only during regular business hours and may not be available until the next business day. Your provider may request additional information or to have a follow-up visit with you prior to refilling your medication.   *Please Note: Medication refills are assigned a new Rx number when refilled electronically. Your pharmacy may indicate that no refills were authorized even though a new prescription for the same medication is available at the pharmacy. Please request the medicine by name with the pharmacy before contacting your provider for a refill.        Your To Do List     Future Labs/Procedures Complete By Expires    BASIC METABOLIC PANEL  As directed 8/18/2018         [employer] Access Code: Activation code not generated  Current [employer] Status: Active          Quit Tobacco Information     Do you want to quit using tobacco?    Quitting tobacco decreases risks of cancer, heart and lung disease, increases life expectancy, improves sense of taste and smell, and increases spending money, among other benefits.    If you are thinking about quitting, we can help.  • [facility] Quit Tobacco Program: [phone]  o Program occurs weekly for four weeks and includes pharmacist consultation on products to support quitting smoking or chewing tobacco. A provider referral is needed for pharmacist consultation.  • Tobacco Users Help Hotline: 1-648-QUIT-NOW (934-2822) or https://[place].quitlogix.org/  o Free,  confidential telephone and online coaching for [place] residents. Sessions are designed on a schedule that is convenient for you. Eligible clients receive free nicotine replacement therapy.  • Nationally: www.smokefree.gov  o Information and professional assistance to support both immediate and long-term needs as you become, and remain, a non-smoker. Smokefree.gov allows you to choose the help that best fits your needs.

## 2017-08-17 NOTE — PROGRESS NOTES
"Subjective:      Yoko Mistry is a 77 y.o. female who presents with Follow-Up and Results            HPI     Patient is here for follow-up of her medical problems.    Most recent blood work came back she has elevated CO2 (bicarbonate) consistent with compensated respiratory acidosis from her severe COPD. We cut her Klonopin dose from 1 mg twice a day to 0.5 mg at night. She said the dose is too low that she is having difficulty with her anxiety. Her original dose was 0.5 mg twice daily and then in the last few months this was increased to 1 mg twice daily.    In terms of her COPD, she said her pulmonologist increase her oxygen to 4 L/m in 6/17. She continues to use her inhalers. She denies increasing shortness of breath more than the usual. She denies any cough.    Her blood pressure is elevated. This was also elevated when she was seen by the pulmonary clinic in June 2017. She said she has been taking her blood pressure medications regularly which are lisinopril and metoprolol. She said there was definite family 2 weeks ago. She was also stressed by the news today with possible terrorist attack in [place].    In terms of her dyslipidemia, she continues to take simvastatin. She denies any myalgias.    Past medical history, past surgical history, family history reviewed-no changes    Social history reviewed-no changes    Allergies reviewed-no changes    Medications reviewed-no changes    ROS     Review of systems as per history of present illness, the rest are negative.       Objective:     /80 mmHg  Pulse 92  Temp(Src) 37.1 °C (98.7 °F)  Resp 18  Ht 1.651 m (5' 5\")  Wt 50.803 kg (112 lb)  BMI 18.64 kg/m2  SpO2 95%  Breastfeeding? No     Physical Exam     Examined alert, awake, oriented, not in distress    Neck-supple, no lymphadenopathy, no thyromegaly  Lungs-very diminished breath sounds all lung fields, no rales, no wheezes  Heart-very distant heart sounds but regular rate and rhythm  Extremities-no " edema, clubbing, cyanosis     Hospital Outpatient Visit on 08/11/2017   Component Date Value   • Cholesterol,Tot 08/11/2017 172    • Triglycerides 08/11/2017 70    • HDL 08/11/2017 62    • LDL 08/11/2017 96    • Sodium 08/11/2017 141    • Potassium 08/11/2017 3.5*   • Chloride 08/11/2017 89*   • Co2 08/11/2017 41*   • Anion Gap 08/11/2017 11.0    • Glucose 08/11/2017 99    • Bun 08/11/2017 11    • Creatinine 08/11/2017 0.88    • Calcium 08/11/2017 9.7    • AST(SGOT) 08/11/2017 20    • ALT(SGPT) 08/11/2017 10    • Alkaline Phosphatase 08/11/2017 26*   • Total Bilirubin 08/11/2017 0.6    • Albumin 08/11/2017 3.8    • Total Protein 08/11/2017 6.2    • Globulin 08/11/2017 2.4    • A-G Ratio 08/11/2017 1.6    • 25-Hydroxy   Vitamin D 25 08/11/2017 36    • GFR If  08/11/2017 >60    • GFR If Non  [employer]* 08/11/2017 >60         Assessment/Plan:     1. COPD, severe (CMS-HCC)  Currently compensated to continue 24-hour oxygen inhalers. Continue follow-up with pulmonary clinic.    2. Chronic respiratory failure with hypoxia and hypercapnia (CMS-HCC)  Continue 24-hour oxygen. Continue inhalers. Continue follow-up with pulmonology clinic.    3. Compensated respiratory acidosis  She has elevated CO2 (bicarbonate) consistent with compensated respiratory acidosis. We have decreased the dose of Klonopin back to her previous dose 0.5 mg twice a day to decrease potential for respiratory depression. We will recheck her BMP.  - BASIC METABOLIC PANEL; Future    4. Essential hypertension  Blood pressure has been elevated on 2 occasions. She will start to monitor blood pressures at home and keep a record. She will continue current doses of her medications which are lisinopril and metoprolol. I will follow her up in a month. If not improved I will increase the dose of the lisinopril at that time.      5. Dyslipidemia  All at target. Continue cholesterol medication.      Please note that this dictation was created using  voice recognition software. I have worked with consultants from the vendor as well as technical experts from [facility] to optimize the interface. I have made every reasonable attempt to correct obvious errors, but I expect that there are errors of grammar and possibly content I did not discover before finalizing the note.

## 2017-08-18 NOTE — TELEPHONE ENCOUNTER
After hours phone call 8/17/2017 at 9:45PM from labs RE: Yoko Mistry [phone] (home)  who reports PCP Mireille Benito M.D..  Lab reports: CO2 critically elevated 41. This is chronic for the patient over the last at least 1 week. Her labs are c/w compensated resp acidosis. PCP aware. She does have severe O2 dependent COPD. PCP has decreased her klonopin dosing and is following her closely. Phone note from abnormal labs reviewed from 8/11/17 as well.  Plan: Patient was seen in clinic today, no change in mental status. I will send note to PCP Dr. Benito. Patient is following with her and pulmonology closely.  Patient instructed to call or go to ER if symptoms worsen or they have any new concerns.  Izabela Stover M.D.

## 2017-08-30 NOTE — TELEPHONE ENCOUNTER
Phone Number Called: [phone] (home)       Message: Pt states she is taking .5mg of her Clonaz twice a day. She states she needs a new Rx for her higher dose.     Left Message for patient to call back: N\A

## 2017-09-03 PROBLEM — J44.1 COPD EXACERBATION (HCC): Status: ACTIVE | Noted: 2017-01-01

## 2017-09-03 PROBLEM — I48.91 NEW ONSET A-FIB (HCC): Status: ACTIVE | Noted: 2017-01-01

## 2017-09-03 PROBLEM — R93.89 ABNORMAL CHEST X-RAY: Status: ACTIVE | Noted: 2017-01-01

## 2017-09-04 PROBLEM — J20.9 ACUTE BRONCHITIS: Status: ACTIVE | Noted: 2017-01-01

## 2017-09-04 PROBLEM — J96.21 ACUTE ON CHRONIC RESPIRATORY FAILURE WITH HYPOXIA (HCC): Status: ACTIVE | Noted: 2017-01-01

## 2017-09-04 PROBLEM — I48.0 PAROXYSMAL A-FIB (HCC): Status: ACTIVE | Noted: 2017-01-01

## 2017-09-04 NOTE — ASSESSMENT & PLAN NOTE
- continue BD per RT protocol, home symbicort and spiriva. Continue O2 to keep sats>88%.   - continue antibiotics for acute bronchitis. Trend PCT.   -We'll transition her to oral steroids and oral antibiotics  -She appears to be at her baseline

## 2017-09-04 NOTE — ASSESSMENT & PLAN NOTE
- no consolidation on chest CT.   - continue doxycycline, and add IV ceftriaxone for now. Check procalcitonin and trend. Send for sputum GS/culture.

## 2017-09-04 NOTE — ED NOTES
Med  rec updated  And complete.  Allergies reviewed.  Met with pt at bedside and dicussed current   Medications and last doses taken.  Pt denies antibiotic use in last 30 days.

## 2017-09-04 NOTE — ASSESSMENT & PLAN NOTE
- now back to NSR.   - continue metoprolol. Discussed anticoagulation, as CHADS2=2. Pt wary about being on blood thinners given bad experience with that with her  but she will consider it. Full dose ASA for now.   - continue to monitor on telemetry.

## 2017-09-04 NOTE — ASSESSMENT & PLAN NOTE
- Currently home dose buspar and clonazepam.  - We'll consider increasing her Klonopin or Xanax as she remains extremely anxious today

## 2017-09-04 NOTE — ED NOTES
Pt bib ems c/o sob x 3 days hx of copd. Pt states that she has also been vomiting x 3 days as well.  Pt states today she began to feel dizzy when getting up. Pt states she also does not have air conditioning in her house and feels its too hot for her. Pt was given 1 Albuterol tx and duoneb x 2 pta and reports some relief with breathing. Pt normally wears 4L 02.

## 2017-09-04 NOTE — ASSESSMENT & PLAN NOTE
- back to baseline O2 requirement.   - continue nebs, steroids, antibiotics as above. Continue O2.

## 2017-09-04 NOTE — PROGRESS NOTES
[facility] Hospitalist Progress Note    Date of Service: 2017    Chief Complaint  77/F with HTN, COPD on 4L home O2, HLD, admitted for SOB x 1 week. Noted to be wheezy. Initial labs showed K 2.6, Na 133. . Trop negative. CXR showed hazy opacity on the right, with hyperinflation. Chest CT showed no airspace opacity or mass. PCT low. Started on BD, steroids, and docycycline. Noted to have afib with normal rate on EKG. K replaced.     Interval Problem Update  2017 - no overnight events. Remains hemodynamically stable and afebrile. Stable on 4L O2 NC. HR rate controlled, NSR.  K improved to 3.8.    > Seen and examined. Still with SOB, chest feels tight. No CP, nausea, vomiting, abd pain. (+) cough with yellowish phlegm. Discussed anticoagulation with her, states she prefers not to be on it as her  was on that and had significant bleeding, but will think about it.     Consultants/Specialty  None    Disposition  Monitor on telemetry.        ROS     Pertinent positives/negatives as mentioned above.     A complete review of systems was done. All other systems were negative.      Physical Exam  Laboratory/Imaging   Hemodynamics  Temp (24hrs), Av.8 °C (98.2 °F), Min:36.3 °C (97.4 °F), Max:37.6 °C (99.7 °F)   Temperature: 36.8 °C (98.3 °F)  Pulse  Av.8  Min: 75  Max: 89 Heart Rate (Monitored): (!) 102  Blood Pressure : 116/61, NIBP: (!) 178/71      Respiratory      Respiration: 17, Pulse Oximetry: 96 %, O2 Daily Delivery Respiratory : Silicone Nasal Cannula     Given By:: Mouthpiece, #MDI/DPI Given: MDI/DPI x 2, Work Of Breathing / Effort: Mild  RUL Breath Sounds: Diminished, RML Breath Sounds: Diminished, RLL Breath Sounds: Diminished, RIAZ Breath Sounds: Diminished, LLL Breath Sounds: Diminished    Fluids    Intake/Output Summary (Last 24 hours) at 17 1113  Last data filed at 17 0512   Gross per 24 hour   Intake                0 ml   Output              500 ml   Net             -500 ml        Nutrition  Orders Placed This Encounter   Procedures   • Diet Order     Standing Status:   Standing     Number of Occurrences:   1     Order Specific Question:   Diet:     Answer:   Regular [1]     Physical Exam   Constitutional: She is oriented to person, place, and time. She appears well-developed and well-nourished. No distress.   HENT:   Head: Normocephalic and atraumatic.   Mouth/Throat: No oropharyngeal exudate.   Eyes: Conjunctivae are normal. Pupils are equal, round, and reactive to light. No scleral icterus.   Neck: Normal range of motion. Neck supple.   Cardiovascular: Normal rate and regular rhythm.  Exam reveals no gallop and no friction rub.    No murmur heard.  Pulmonary/Chest: Effort normal. No respiratory distress. She has no wheezes. She has no rales. She exhibits no tenderness.   Tight air entry B/L lung fields.    Abdominal: Soft. Bowel sounds are normal. She exhibits no distension. There is no tenderness. There is no rebound and no guarding.   Musculoskeletal: Normal range of motion. She exhibits no edema or tenderness.   Lymphadenopathy:     She has no cervical adenopathy.   Neurological: She is alert and oriented to person, place, and time. No cranial nerve deficit.   Skin: Skin is warm and dry. No rash noted. No erythema. No pallor.   Psychiatric: She has a normal mood and affect. Her behavior is normal. Judgment and thought content normal.   Nursing note and vitals reviewed.      Recent Labs      09/03/17   1715   WBC  6.8   RBC  4.17*   HEMOGLOBIN  12.6   HEMATOCRIT  36.9*   MCV  88.5   MCH  30.2   MCHC  34.1   RDW  37.9   PLATELETCT  173   MPV  9.3     Recent Labs      09/03/17   1715  09/04/17   0156   SODIUM  133*  134*   POTASSIUM  2.6*  3.8   CHLORIDE  84*  94*   CO2  42*  35*   GLUCOSE  107*  145*   BUN  9  11   CREATININE  0.85  0.73   CALCIUM  9.2  8.6         Recent Labs      09/03/17   1715   BNPBTYPENAT  173*              Assessment/Plan     * COPD exacerbation (CMS-[facility])-  (present on admission)   Assessment & Plan    - still with tight air entry.   - start solumedrol 62.5mg IV BID for now, taper with clinical improvement.  - continue BD per RT protocol, home symbicort and spiriva. Continue O2 to keep sats>88%.   - continue antibiotics for acute bronchitis. Trend PCT.           Acute bronchitis- (present on admission)   Assessment & Plan    - no consolidation on chest CT.   - continue doxycycline, and add IV ceftriaxone for now. Check procalcitonin and trend. Send for sputum GS/culture.         Acute on chronic respiratory failure with hypoxia due to AECOPD (CMS-HCC)- (present on admission)   Assessment & Plan    - back to baseline O2 requirement.   - continue nebs, steroids, antibiotics as above. Continue O2.         Paroxysmal a-fib (CMS-HCC)- (present on admission)   Assessment & Plan    - now back to NSR.   - continue metoprolol. Discussed anticoagulation, as CHADS2=2. Pt wary about being on blood thinners given bad experience with that with her  but she will consider it. Full dose ASA for now.   - continue to monitor on telemetry.         Hypokalemia- (present on admission)   Assessment & Plan    - replaced, K now WNL.   - continue to monitor. Check Mg level and replace if low. BMP in AM.         Tobacco dependence- (present on admission)   Assessment & Plan    - counseled on smoking cessation. Continue nicotine replacement.         Anxiety disorder- (present on admission)   Assessment & Plan    - Currently home dose buspar and clonazapam.        HTN (hypertension)- (present on admission)   Assessment & Plan    - continue home metoprolol and lisinopril. Continue PRN IV labetalol for significant HTN. Continue to trend BP.             Reviewed items::  Labs reviewed, Medications reviewed and Radiology images reviewed  Koch catheter::  No Koch  DVT prophylaxis pharmacological::  Enoxaparin (Lovenox)  Ulcer Prophylaxis::  Not indicated  Antibiotics:  Treating active  infection/contamination beyond 24 hours perioperative coverage

## 2017-09-04 NOTE — CARE PLAN
Problem: Oxygenation:  Goal: Maintain adequate oxygenation dependent on patient condition  Home oxygen  4LPM via NC    Problem: Bronchoconstriction:  Goal: Improve in air movement and diminished wheezing  DUO Q4  Reassess after 24 hrs for COPD exacerbation protocol

## 2017-09-04 NOTE — ED NOTES
K+ infusing with NS open at a slow drip to dilute K+. Pt was c/o burning in her vein with K+. Admitting MD aware of NS infusing.

## 2017-09-04 NOTE — CARE PLAN
Problem: Safety  Goal: Will remain free from injury  Outcome: PROGRESSING AS EXPECTED  Pt remains free from falls or injuries. Bed alarm and strip alarm set and in place. Pt calls for assistance appropriately.     Problem: Venous Thromboembolism (VTW)/Deep Vein Thrombosis (DVT) Prevention:  Goal: Patient will participate in Venous Thrombosis (VTE)/Deep Vein Thrombosis (DVT)Prevention Measures  Outcome: PROGRESSING AS EXPECTED  Pt free from s/sx of DVT. Pt receiving Lovenox for VTE prophylaxis.

## 2017-09-04 NOTE — ASSESSMENT & PLAN NOTE
Radiology read for right middle pneumonia but also concern for pleural based mass. Does not appear to be consistent with a pneumonia per my interpretation but would like to evaluate the possible concern for mass.  - CT chest without contrast pending

## 2017-09-04 NOTE — H&P
" Hospital Medicine History and Physical    Date of Service  9/4/2017    Chief Complaint  Chief Complaint   Patient presents with   • Shortness of Breath       History of Presenting Illness  77 y.o. female who presented 9/3/2017 with progressive SOB x 1 week. She states that she is baseline on 4L NC at home but has been feeling like she is \"not getting enough oxygen\". She became concerned when she was no longer able to move about the house without severe SOB and a chest pressure with inspiration. She endorses 1 episode of non-bloody vomitus when she became so short of breath. States that her appetite has always been bad and she has recently lost 5-6 pounds in the last 3 months. Mostly eats salads with her son.  Primary Care Physician  Mireille eBnito M.D.      Code Status  Full Code    Review of Systems  Review of Systems   Constitutional: Positive for malaise/fatigue and weight loss. Negative for chills and fever.   Respiratory: Positive for cough, sputum production and shortness of breath.    Cardiovascular: Positive for chest pain and leg swelling. Negative for palpitations.   Gastrointestinal: Positive for nausea and vomiting. Negative for abdominal pain and diarrhea.   Musculoskeletal: Positive for neck pain. Negative for falls.   Neurological: Positive for weakness. Negative for dizziness and headaches.        Past Medical History  Past Medical History:   Diagnosis Date   • Macular degeneration 02/17   • HTN (hypertension) 7/11/2011   • COPD (chronic obstructive pulmonary disease) (CMS-[facility]) 7/11/2011   • Anxiety disorder 7/11/2011   • Chickenpox    • COPD    • Depression    • [place] measles    • Hyperlipidemia    • Hypertension    • Influenza    • Mumps    • Smallpox        Surgical History  Past Surgical History:   Procedure Laterality Date   • FEMORAL POPLITEAL ANGIOPLASTY WITH STENT  2009   • ABDOMINAL HYSTERECTOMY TOTAL     • CARDIOVASCULAR      cardio ablation   • CHOLECYSTECTOMY     • HYSTERECTOMY " LAPAROSCOPY         Medications  No current facility-administered medications on file prior to encounter.      Current Outpatient Prescriptions on File Prior to Encounter   Medication Sig Dispense Refill   • ipratropium-albuterol (DUONEB) 0.5-2.5 (3) MG/3ML nebulizer solution INHALE 1 VIAL VIA NEBULIZER 4 TIMES A DAY 90 mL 5   • busPIRone (BUSPAR) 5 MG Tab Take 1 Tab by mouth every day. 30 Tab 5   • SYMBICORT 160-4.5 MCG/ACT Aerosol INHALE 2 PUFFS BY MOUTH 2 TIMES A DAY. USE SPACER. RINSE MOUTH AFTER EACH USE. 1 Inhaler 5   • metoprolol SR (TOPROL XL) 50 MG TABLET SR 24 HR TAKE 1 TABLET BY MOUTH DAILY 90 Tab 0   • lisinopril (PRINIVIL) 20 MG Tab TAKE 1 TABLET BY MOUTH 2 TIMES A  Tab 1   • albuterol (VENTOLIN HFA) 108 (90 BASE) MCG/ACT Aero Soln inhalation aerosol Inhale 2 Puffs by mouth every 6 hours as needed for Shortness of Breath. 1 Inhaler 2   • GARLIC PO Take 1 tablet by mouth every day.     • simvastatin (ZOCOR) 20 MG Tab Take 20 mg by mouth every evening.     • aspirin EC (ECOTRIN) 81 MG TBEC Take 81 mg by mouth every day.         Family History  Family History   Problem Relation Age of Onset   • Heart Disease Mother      aortic stenosis   • Heart Disease Father    • Cancer Brother        Social History  Social History   Substance Use Topics   • Smoking status: Current Every Day Smoker     Packs/day: 0.25     Years: 40.00     Types: Cigarettes   • Smokeless tobacco: Never Used      Comment: Down to 5 cigs a day per patient  Started in her 30s   • Alcohol use No   Born in [place]. Lives with son and cat. Her other son passed away. She started smoking at 32 yrs of age to lose weight.    Allergies  Allergies   Allergen Reactions   • Citalopram Unspecified     Low sodium        Physical Exam  Laboratory   Hemodynamics  Temp (24hrs), Av.8 °C (98.2 °F), Min:36.3 °C (97.4 °F), Max:37.6 °C (99.7 °F)   Temperature: 36.4 °C (97.5 °F)  Pulse  Av.3  Min: 75  Max: 85 Heart Rate (Monitored): (!) 102  Blood  Pressure : 156/74 (rn notified), NIBP: (!) 178/71      Respiratory      Respiration: 18, Pulse Oximetry: 93 %     Work Of Breathing / Effort: Moderate  RUL Breath Sounds: Diminished, RML Breath Sounds: Diminished, RLL Breath Sounds: Diminished, RIAZ Breath Sounds: Diminished, LLL Breath Sounds: Diminished    Physical Exam   Constitutional: She is oriented to person, place, and time. She appears well-developed. She is cooperative.   Restless, thin   Eyes: Conjunctivae are normal. Pupils are equal, round, and reactive to light.   Neck: Neck supple. No JVD present.   Cardiovascular: Normal rate, regular rhythm and intact distal pulses.  Exam reveals no S3.    Pulmonary/Chest: Tachypnea noted. She has no rales.   Decreased breath sounds at the bases, intermittent wheezing with prolonged expiratory phase   Musculoskeletal:   Trace LE edema of the feet   Neurological: She is alert and oriented to person, place, and time.   Skin: Skin is warm and dry.   Poor capillary refill       Recent Labs      09/03/17   1715   WBC  6.8   RBC  4.17*   HEMOGLOBIN  12.6   HEMATOCRIT  36.9*   MCV  88.5   MCH  30.2   MCHC  34.1   RDW  37.9   PLATELETCT  173   MPV  9.3     Recent Labs      09/03/17   1715   SODIUM  133*   POTASSIUM  2.6*   CHLORIDE  84*   CO2  42*   GLUCOSE  107*   BUN  9   CREATININE  0.85   CALCIUM  9.2     Recent Labs      09/03/17   1715   ALTSGPT  9   ASTSGOT  27   ALKPHOSPHAT  26*   TBILIRUBIN  0.6   GLUCOSE  107*         Recent Labs      09/03/17   1715   BNPBTYPENAT  173*         Lab Results   Component Value Date    TROPONINI <0.01 09/03/2017     Urinalysis:    Lab Results  Component Value Date/Time   SPECGRAVITY 1.006 12/12/2011 0958   GLUCOSEUR Negative 12/12/2011 0958   KETONES Negative 12/12/2011 0958   NITRITE Negative 12/12/2011 0958   WBCURINE >150 (A) 10/05/2011 0019   RBCURINE >150 (A) 10/05/2011 0019   BACTERIA Few (A) 10/05/2011 0019   EPITHELCELL Rare 10/05/2011 0019        Imaging  CT-CHEST (THORAX) W/O    Final Result         1. No airspace opacity or mass identified.            DX-CHEST-PORTABLE (1 VIEW)   Final Result      1.  Hazy increased opacity in the RIGHT midlung suggesting pneumonia.  Pleural-based masses also a consideration.   2.  Hyperinflation consistent with COPD.         Assessment/Plan     I anticipate this patient will require at least two midnights for appropriate medical management, necessitating inpatient admission.    * COPD exacerbation (CMS-HCC)- (present on admission)   Assessment & Plan    Chronic COPD on 4L NC at home. Progressive shortness of breath with sputum production and decreased exercise tolerance. Initial concern for pneumonia but afebrile, normal WBC without left shift, and CXR not convincing, procalcitonin low. S/p unasyn and azithromycin in ED  - RT to follow with duonebs  - doxycycline BID  - goal SaO2 >/= 90% with minimum amount of oxygen supplementation  - continue home symbicort            Paroxysmal a-fib (CMS-HCC)- (present on admission)   Assessment & Plan    EKG in 2011 showed normal sinus found to be in afib with normal rate. Per history, patient is unable to tell if her rate is high or rhythm irregular. May be 2/2 increased stress response and hypoxemia from COPD exacerbation.  - repeat EKG normal sinus  - monitor on tele, may need holter on discharge for further evaluation   - no anticoagulation at this time   - continue with ASA        Abnormal chest x-ray- (present on admission)   Assessment & Plan    Radiology read for right middle pneumonia but also concern for pleural based mass. Does not appear to be consistent with a pneumonia per my interpretation but would like to evaluate the possible concern for mass.  - CT chest without contrast pending        Hypokalemia- (present on admission)   Assessment & Plan    Found to have K 2.6 on presentation. Unclear source of loss, no GI loss and not on diuretics. No EKG changes  - s/p KCl 10meq IV in ED  - KCl 40meq PO  now  - repeat BMP tonight          HTN (hypertension)- (present on admission)   Assessment & Plan    Patient with hypertension on metop SR and lisinopril. Last nuclear stress test in 2011 showed no wall motion abnormalities and EF 63%. Currently hypertensive with 's.  - resume home metop and lisinopril  - labetalol IV PRN for SBP > 180mmHg        Current smoker- (present on admission)   Assessment & Plan    Strongly encouraged to quit, smokes pack/day since she was 33 yo. Has thought about quitting but not ready to plan cessation at this time.  - Nicotine replacement PRN    Counseled at least 3 minutes on 09/03/17        Anxiety disorder- (present on admission)   Assessment & Plan    Currently on buspar and clonazapam.  - continue home regimen            VTE prophylaxis: lovenox, SCDs.

## 2017-09-04 NOTE — PROGRESS NOTES
Bedside report received, assumed pt care @9378. Pt A&Ox4. She denies any pain. Pt c/o SOB with any exertion. POC discussed, she verbalized understanding. Call light within reach

## 2017-09-04 NOTE — CARE PLAN
Problem: Oxygenation:  Goal: Maintain adequate oxygenation dependent on patient condition  4LNC    Problem: Bronchoconstriction:  Goal: Improve in air movement and diminished wheezing  DUO Q4  COPD Exac

## 2017-09-04 NOTE — PROGRESS NOTES
Ivis Dwyer Fall Risk Assessment:     Last Known Fall: No falls  Mobility: Immobilized/requires assist of one person  Medications: Cardiovascular or central nervous system meds  Mental Status/LOC/Awareness: Awake, alert, and oriented to date, place, and person  Toileting Needs: Use of assistive device (Bedside commode, bedpan, urinal)  Volume/Electrolyte Status: Use of IV fluids/tube feeds  Communication/Sensory: No deficits  Behavior: Appropriate behavior  Ivis Dwyer Fall Risk Total: 10  Fall Risk Level: LOW RISK    Universal Fall Precautions:  call light/belongings in reach, bed in low position and locked, siderails up x 2, use non-slip footwear, adequate lighting, clutter free and spill free environment, educate on level of risk, educate to call for assistance, wheelchairs and assistive devices out of sight    Fall Risk Level Interventions:   TRIAL (TELE 8, NEURO, MED NICKY 5) Low Fall Risk Interventions  Place yellow fall risk ID band on patient: completed  Provide patient/family education based on risk assessment: completed  Educate patient/family to call staff for assistance when getting out of bed: completed  Place fall precaution signage outside patient door: completed      Patient Specific Interventions:     Medication: review medications with patient and family  Mental Status/LOC/Awareness: reinforce falls education, check on patient hourly, utilize bed/chair fall alarm and reinforce the use of call light  Toileting: consider obtaining elevated toilet seat or bedside commode (BSC), provide frquent toileting, monitor intake and output/use of appropriate interventions, instruct patient/family on the use of grab bars, instruct patient/family on the need to call for assistance when toileting and do not leave patient unattended in bathroom/refer to toileting scripting  Volume/Electrolyte Status: monitor abnormal lab values  Communication/Sensory: update plan of care on whiteboard  Behavioral: encourage  patient to voice feelings, administer medication as ordered and instruct/reinforce fall program rationale  Mobility: utilize bed/chair fall alarm, ensure bed is locked and in lowest position and provide appropriate assistive device

## 2017-09-04 NOTE — ED PROVIDER NOTES
ED Provider Note    CHIEF COMPLAINT  Chief Complaint   Patient presents with   • Shortness of Breath       HPI  Yoko Mistry is a 77 y.o. female who presentsTo the emergency department complaining of 3 or 4 days of progressively worsening shortness of breath including dyspnea with exertion. The patient says she's been feeling weak and lightheaded she's had some slight episodes of vomiting. EMS was called to the home today and the patient was given multiple nebulizer treatments and she said that this was somewhat helpful. The patient does not recognize any precipitating events or exacerbating or alleviating factors    REVIEW OF SYSTEMS no chest pain no hemoptysis no black or bloody stool or emesis. All other systems negative    PAST MEDICAL HISTORY  Past Medical History:   Diagnosis Date   • Macular degeneration 02/17   • HTN (hypertension) 7/11/2011   • COPD (chronic obstructive pulmonary disease) (CMS-[facility]) 7/11/2011   • Anxiety disorder 7/11/2011   • Chickenpox    • COPD    • Depression    • Sami measles    • Hyperlipidemia    • Hypertension    • Influenza    • Mumps    • Smallpox        FAMILY HISTORY  Family History   Problem Relation Age of Onset   • Heart Disease Mother      aortic stenosis   • Heart Disease Father    • Cancer Brother        SOCIAL HISTORY  Social History     Social History   • Marital status: Single     Spouse name: N/A   • Number of children: N/A   • Years of education: N/A     Social History Main Topics   • Smoking status: Current Every Day Smoker     Packs/day: 0.25     Years: 40.00     Types: Cigarettes   • Smokeless tobacco: Never Used      Comment: Down to 5 cigs a day per patient  Started in her 30s   • Alcohol use No   • Drug use: No   • Sexual activity: Not on file     Other Topics Concern   • Not on file     Social History Narrative   • No narrative on file       SURGICAL HISTORY  Past Surgical History:   Procedure Laterality Date   • FEMORAL POPLITEAL ANGIOPLASTY WITH STENT  2009  "  • ABDOMINAL HYSTERECTOMY TOTAL     • CARDIOVASCULAR      cardio ablation   • CHOLECYSTECTOMY     • HYSTERECTOMY LAPAROSCOPY         CURRENT MEDICATIONS  Home Medications     Reviewed by Elis Snowden (Pharmacy Tech) on 09/03/17 at 1820  Med List Status: Complete   Medication Last Dose Status   albuterol (VENTOLIN HFA) 108 (90 BASE) MCG/ACT Aero Soln inhalation aerosol 9/3/2017 Active   aspirin EC (ECOTRIN) 81 MG TBEC 9/3/2017 Active   busPIRone (BUSPAR) 5 MG Tab 9/3/2017 Active   clonazepam (KLONOPIN) 0.5 MG Tab 9/2/2017 Active   GARLIC PO 9/3/2017 Active   ipratropium-albuterol (DUONEB) 0.5-2.5 (3) MG/3ML nebulizer solution 9/3/2017 Active   lisinopril (PRINIVIL) 20 MG Tab 9/3/2017 Active   metoprolol SR (TOPROL XL) 50 MG TABLET SR 24 HR 9/3/2017 Active   simvastatin (ZOCOR) 20 MG Tab 9/2/2017 Active   SYMBICORT 160-4.5 MCG/ACT Aerosol 9/3/2017 Active                ALLERGIES  Allergies   Allergen Reactions   • Citalopram Unspecified     Low sodium       PHYSICAL EXAM  VITAL SIGNS: BP (!) 167/93   Pulse 75   Temp 37.6 °C (99.7 °F)   Resp (!) 28   Ht 1.702 m (5' 7\")   Wt 51.3 kg (113 lb)   SpO2 98%   BMI 17.70 kg/m²    Oxygen saturation is interpreted as Adequate with supplemental oxygen  Constitutional: Awake and chronically ill-appearing but she does not look acutely distressed, the patient is extremely thin  HENT: Mucous membranes are dry  Eyes: No erythema or discharge or jaundice  Neck: Trachea midline no JVD  Cardiovascular: Irregularly irregular rhythm  Lungs: Extremely distant bilaterally  Abdomen/Back: Soft nondistended nontender no peritoneal findings  Skin: Warm and dry  Musculoskeletal: No acute bony deformity no leg edema or calf tenderness  Neurologic: Awake and verbal and moving all extremities    Laboratory  A CBC shows white blood count 6.8 with hemoglobin of 12.6, basic metabolic panel shows a slightly low potassium of 2.6, LFTs are unremarkable, troponin is normal BNP is 173, TSH is " 1.33    EKG interpretation  12-lead EKG shows an irregularly irregular rhythm at 100 bpm consistent with atrial fibrillation large voltages are noted in V2 through V5 suggesting LVH. For comparison the patient was in a sinus rhythm on the EKG from October 4, 2011 however this was 6 years ago but is the most recent EKG we have for comparison    Radiology  DX-CHEST-PORTABLE (1 VIEW)   Final Result      1.  Hazy increased opacity in the RIGHT midlung suggesting pneumonia.  Pleural-based masses also a consideration.   2.  Hyperinflation consistent with COPD.        MEDICAL DECISION MAKING and DISPOSITION  In the emergency department blood cultures were sent to the lab the patient was placed on a cardiac monitor and given supplemental oxygen and she was given intravenous antibiotics and oral Decadron and intravenous and oral potassium supplementation. I have reviewed all the findings with the patient and I have also reviewed the case with the hospitalist and the patient will be admitted for further evaluation and treatment    IMPRESSION  1. Acute COPD exacerbation  2. Pneumonia  3. Abnormality on chest x-ray includes differential diagnosis of lung mass  4. Hypokalemia           Electronically signed by: Yosi De Jesus, 9/3/2017 7:32 PM

## 2017-09-04 NOTE — PROGRESS NOTES
Pt arrived to floor by jessica via ER RN. Monitor room notified of pt's arrival to floor. Pt care assumed. Patient assessed. Pt lying comfortably in bed. A&O x 4. Pt on 6L NC. No distress present; no pain. Call light, phone, and bedside table within reach. White board updated and plan of care discussed with patient. Bed alarm and strip alarm set and in place. Pt calls for assistance appropriately. No concerns present at this time. Will continue to monitor.

## 2017-09-04 NOTE — ASSESSMENT & PLAN NOTE
- continue home metoprolol and lisinopril. Continue PRN IV labetalol for significant HTN. Continue to trend BP.   -She was quite hypertensive this morning therefore Norvasc 5 mg daily was added

## 2017-09-05 NOTE — DIETARY
Nutrition: day 1 of admit.  76 yo female admitted with COPD exacerbation.  She is noted to have had weight loss PTA.  MD notes indicate 5-6# over 3 months.  Pt currently weighs 51 kg on admit.  Her weight in 11/2016 was 50 kg.  She is now on a regular diet with uncertain intake - no meals documented in ADL's.  Nutrition representative to see pt daily for meal and snack preferences.    Recommendations:  1) encourage intake of meals. If pt taking less than 50% of most meals order supplements to better meet nutrition needs.  2) document intake of all meals and supplements as % taken in ADL's to provide interdisciplinary communication across all shifts   3) monitor daily weights

## 2017-09-05 NOTE — PROGRESS NOTES
"Pt reports numbness in hands is resolved.  Pt states \"I feeling better, just anxious.\" Pt appears very anxious at this time. Dr Triana updated. New orders received for Xanax PRN.  "

## 2017-09-05 NOTE — DISCHARGE PLANNING
Received choice form from [facility] for HH services, referral has been sent to [facility] (1st choice) per patient request.

## 2017-09-05 NOTE — PROGRESS NOTES
" RN started pt's IV magnesium and 10 min after start of infusion pt reports feeling short of breath, \"gas pain in my stomach like I have to burp but it won't come up,\" and numbness in hands. Pt /100, . Dr Triana and pharmacy notified. New orders received to give PRN hydralazine for BP and reassess after   "

## 2017-09-05 NOTE — PROGRESS NOTES
Bedside report received, assumed pt care @8587. Pt A&Ox4. Pt very anxious.  She denies any pain. POC discussed, she verbalized understanding.  Pt unsteady on her feet and very SOB with any exertion. Call light within reach

## 2017-09-05 NOTE — PROGRESS NOTES
[facility] Hospitalist Progress Note    Date of Service: 2017    Chief Complaint  77/F with HTN, COPD on 4L home O2, HLD, admitted for SOB x 1 week. Noted to be wheezy. Initial labs showed K 2.6, Na 133. . Trop negative. CXR showed hazy opacity on the right, with hyperinflation. Chest CT showed no airspace opacity or mass. PCT low. Started on BD, steroids, and docycycline. Noted to have afib with normal rate on EKG. K replaced.     Interval Problem Update  Had episode of numbness and tingling and anxiety and HTN after being given dose of Magnesium yesterday  Now at baseline 4 liters o2  Denies fevers  Very anxious    Consultants/Specialty  None    Disposition  Monitor on telemetry.        Review of Systems   Constitutional: Negative for chills and fever.   HENT: Negative for hearing loss.    Respiratory: Positive for cough and shortness of breath.    Cardiovascular: Negative for chest pain and palpitations.   Gastrointestinal: Negative for nausea and vomiting.   Genitourinary: Negative for dysuria and frequency.   Musculoskeletal: Negative for back pain and myalgias.   Skin: Negative for itching and rash.   Neurological: Positive for weakness. Negative for dizziness, tingling and headaches.   Psychiatric/Behavioral: Negative for depression and suicidal ideas. The patient is nervous/anxious.         Pertinent positives/negatives as mentioned above.     A complete review of systems was done. All other systems were negative.      Physical Exam  Laboratory/Imaging   Hemodynamics  Temp (24hrs), Av.8 °C (98.3 °F), Min:36.4 °C (97.5 °F), Max:37.3 °C (99.2 °F)   Temperature: 36.4 °C (97.5 °F)  Pulse  Av.7  Min: 75  Max: 117   Blood Pressure : (!) 162/81      Respiratory      Respiration: 18, Pulse Oximetry: 96 %, O2 Daily Delivery Respiratory : Silicone Nasal Cannula     Given By:: Mouthpiece, #MDI/DPI Given: MDI/DPI x 2, Work Of Breathing / Effort: Mild  RUL Breath Sounds: Clear, RML Breath Sounds:  Diminished, RLL Breath Sounds: Diminished, RIAZ Breath Sounds: Diminished, LLL Breath Sounds: Diminished    Fluids    Intake/Output Summary (Last 24 hours) at 09/05/17 1033  Last data filed at 09/05/17 0952   Gross per 24 hour   Intake                0 ml   Output              650 ml   Net             -650 ml       Nutrition  Orders Placed This Encounter   Procedures   • Diet Order     Standing Status:   Standing     Number of Occurrences:   1     Order Specific Question:   Diet:     Answer:   Regular [1]     Physical Exam   Constitutional: She is oriented to person, place, and time. She appears ill. No distress.   HENT:   Head: Normocephalic and atraumatic.   Eyes: Conjunctivae and EOM are normal. Pupils are equal, round, and reactive to light.   Neck: Normal range of motion. Neck supple.   Cardiovascular: Normal rate and regular rhythm.    Pulmonary/Chest: Effort normal. No respiratory distress. She has decreased breath sounds. She has no wheezes. She exhibits no tenderness.   Tight air entry B/L lung fields.    Abdominal: Soft. Bowel sounds are normal. She exhibits no distension.   Musculoskeletal: Normal range of motion. She exhibits no edema.   Lymphadenopathy:     She has no cervical adenopathy.   Neurological: She is alert and oriented to person, place, and time.   Skin: Skin is warm and dry. She is not diaphoretic. No erythema.   Psychiatric: She has a normal mood and affect. Her behavior is normal. Judgment and thought content normal.   Nursing note and vitals reviewed.      Recent Labs      09/03/17 1715 09/05/17 0152   WBC  6.8  10.4   RBC  4.17*  4.27   HEMOGLOBIN  12.6  12.5   HEMATOCRIT  36.9*  37.2   MCV  88.5  87.1   MCH  30.2  29.3   MCHC  34.1  33.6   RDW  37.9  38.7   PLATELETCT  173  192   MPV  9.3  9.3     Recent Labs      09/03/17   1715 09/04/17   0156  09/05/17   0152   SODIUM  133*  134*  129*   POTASSIUM  2.6*  3.8  4.1   CHLORIDE  84*  94*  97   CO2  42*  35*  26   GLUCOSE  107*  145*   138*   BUN  9  11  16   CREATININE  0.85  0.73  0.73   CALCIUM  9.2  8.6  9.1         Recent Labs      09/03/17   1715   BNPBTYPENAT  173*              Assessment/Plan     * COPD exacerbation (CMS-HCC)- (present on admission)   Assessment & Plan    - continue IV steroids  - continue BD per RT protocol, home symbicort and spiriva. Continue O2 to keep sats>88%.   - continue antibiotics for acute bronchitis. Trend PCT.   - I had a lengthy discussion with patient for approximately 10 minutes and she is ok with hospice consult given her end stage copd; she wishes to remain full code status for now.          Acute bronchitis- (present on admission)   Assessment & Plan    - no consolidation on chest CT.   - continue doxycycline, and add IV ceftriaxone for now. Check procalcitonin and trend. Send for sputum GS/culture.         Acute on chronic respiratory failure with hypoxia due to AECOPD (CMS-HCC)- (present on admission)   Assessment & Plan    - back to baseline O2 requirement.   - continue nebs, steroids, antibiotics as above. Continue O2.         Paroxysmal a-fib (CMS-HCC)- (present on admission)   Assessment & Plan    - now back to NSR.   - continue metoprolol. Discussed anticoagulation, as CHADS2=2. Pt wary about being on blood thinners given bad experience with that with her  but she will consider it. Full dose ASA for now.   - continue to monitor on telemetry.         Hypokalemia- (present on admission)   Assessment & Plan    - replaced, K now WNL.   - continue to monitor. Check Mg level and replace if low. BMP in AM.         Tobacco dependence- (present on admission)   Assessment & Plan    - counseled on smoking cessation. Continue nicotine replacement.         Anxiety disorder- (present on admission)   Assessment & Plan    - Currently home dose buspar and clonazepam.        HTN (hypertension)- (present on admission)   Assessment & Plan    - continue home metoprolol and lisinopril. Continue PRN IV labetalol  for significant HTN. Continue to trend BP.             Reviewed items::  Labs reviewed, Medications reviewed and Radiology images reviewed  Koch catheter::  No Koch  DVT prophylaxis pharmacological::  Enoxaparin (Lovenox)  Ulcer Prophylaxis::  Not indicated  Antibiotics:  Treating active infection/contamination beyond 24 hours perioperative coverage

## 2017-09-05 NOTE — OR NURSING
"Pt extremely anxious. Pt sitting on edge of bed. \"I'm having a hard time breathing.\" Pt O2 sat 93% on 4 L. Pt /111, . Dr Triana updated. New orders received to give 1 mg IV morphine and 10 mg IV hydralazine dose now.  "

## 2017-09-05 NOTE — PROGRESS NOTES
Assessment complete, see flowsheet. No complaints, all needs met. Bed in lowest position, call light within reach, will continue to monitor

## 2017-09-05 NOTE — PROGRESS NOTES
Pt care assumed. Pt lying comfortably in bed. A&O x 4.  No distress present; no pain. Call light, phone, and bedside table within reach. White board updated and plan of care discussed with patient. Bed alarm and strip alarm set and in place. Pt calls for assistance appropriately. No concerns present at this time. Will continue to monitor.

## 2017-09-05 NOTE — DISCHARGE PLANNING
"Transitional Care Navigator:    Met with the patient at the bedside regarding discharge planning.  When asked about hospice, the patient indicated that the MD had suggested it, however, she states that she is not ready for hospice.  \"I know that I won't live forever, bu I am not ready for that\".    We discussed having home health and she is agreeable to that.  Choice form completed and faxed.  MD aware that order is needed. SHAAN Rogers aware.   "

## 2017-09-05 NOTE — DISCHARGE PLANNING
Care Transition Team Assessment    IHD met with pt at bedside. Pt currently lives at home with son, who will be providing transportation home upon D/C. Pt expects to be discharged home with Hospice. She previously was not using any DME or home health services. She currently uses home O2 provided by [facility].     Information Source  Orientation : Oriented x 4  Information Given By: Patient  Informant's Name: Yoko  Who is responsible for making decisions for patient? : Patient         Elopement Risk  Legal Hold: No  Ambulatory or Self Mobile in Wheelchair: Yes  Disoriented: No  Psychiatric Symptoms: None  History of Wandering: No  Elopement this Admit: No  Vocalizing Wanting to Leave: No  Displays Behaviors, Body Language Wanting to Leave: No-Not at Risk for Elopement  Elopement Risk: Not at Risk for Elopement    Interdisciplinary Discharge Planning  Does Admitting Nurse Feel This Could be a Complex Discharge?: No  Primary Care Physician:  (Dr Munoz, [facility])  Lives with - Patient's Self Care Capacity: Adult Children (Son)  Patient or legal guardian wants to designate a caregiver (see row info): No  Support Systems: Family Member(s) (Son, sister)  Housing / Facility: [address] [place] [address]  Name of Care Facility:  (None)  Do You Take your Prescribed Medications Regularly: Yes  Able to Return to Previous ADL's: Yes  Mobility Issues: No  Prior Services: None  Patient Expects to be Discharged to::  (Home)  Assistance Needed: No  Durable Medical Equipment: Home Oxygen    Discharge Preparedness  What is your plan after discharge?: Home health care  What are your discharge supports?: Child  Prior Functional Level: Ambulatory, Drives Self, Independent with Medication Management, Independent with Activities of Daily Living  Difficulity with ADLs: None  Difficulity with IADLs: None    Functional Assesment  Prior Functional Level: Ambulatory, Drives Self, Independent with Medication Management, Independent with Activities  of Daily Living    Finances  Financial Barriers to Discharge: No  Prescription Coverage: Yes ([facility] [address])    Vision / Hearing Impairment  Vision Impairment : Yes  Right Eye Vision: Impaired, Wears Glasses  Left Eye Vision: Impaired, Wears Glasses  Hearing Impairment : No    Values / Beliefs / Concerns  Values / Beliefs Concerns : No  Special Hospitalization Concerns:  (None)         Domestic Abuse  Have you ever been the victim of abuse or violence?: No  Physical Abuse or Sexual Abuse: No  Verbal Abuse or Emotional Abuse: No  Possible Abuse Reported to:: Not Applicable    Psychological Assessment  History of Substance Abuse: None  History of Psychiatric Problems: No  Non-compliant with Treatment: No    Discharge Risks or Barriers  Discharge risks or barriers?: No    Anticipated Discharge Information  Anticipated discharge disposition: Home, Hospice  Discharge Address: [address]   Discharge Contact Phone Number: [phone]

## 2017-09-05 NOTE — PROGRESS NOTES
Ivis Dwyer Fall Risk Assessment:     Last Known Fall: No falls  Mobility: Use of assistive device/requires assist of two people  Medications: Cardiovascular or central nervous system meds  Mental Status/LOC/Awareness: Awake, alert, and oriented to date, place, and person  Toileting Needs: Use of assistive device (Bedside commode, bedpan, urinal), Diarrhea/frequency/urgency  Volume/Electrolyte Status: No problems  Communication/Sensory: Visual (Glasses)/hearing deficit  Behavior: Appropriate behavior  Ivis Dwyer Fall Risk Total: 14  Fall Risk Level: MODERATE RISK    Universal Fall Precautions:  call light/belongings in reach, bed in low position and locked, wheelchairs and assistive devices out of sight, siderails up x 2, use non-slip footwear, adequate lighting, clutter free and spill free environment, educate on level of risk, educate to call for assistance    Fall Risk Level Interventions:   TRIAL (AutoESL 8, NEURO, MED NICKY 5) Low Fall Risk Interventions  Place yellow fall risk ID band on patient: verified  Provide patient/family education based on risk assessment: verified  Educate patient/family to call staff for assistance when getting out of bed: verified  Place fall precaution signage outside patient door: verifiedTRIAL (TELE 8, NEURO, MED NICKY 5) Moderate Fall Risk Interventions  Place yellow fall risk ID band on patient: verified  Provide patient/family education based on risk assessment : verified  Educate patient/family to call staff for assistance when getting out of bed: verified  Place fall precaution signage outside patient door: verified  Utilize bed/chair fall alarm: verified     Patient Specific Interventions:     Medication: review medications with patient and family and limit combination of prn medications  Mental Status/LOC/Awareness: reinforce falls education, check on patient hourly, utilize bed/chair fall alarm and reinforce the use of call light  Toileting: consider obtaining elevated  toilet seat or bedside commode (BSC), provide frquent toileting, monitor intake and output/use of appropriate interventions, instruct patient/family on the use of grab bars, instruct patient/family on the need to call for assistance when toileting and do not leave patient unattended in bathroom/refer to toileting scripting  Volume/Electrolyte Status: monitor abnormal lab values  Communication/Sensory: update plan of care on whiteboard and ensure patient has glasses/contacts and hearing aids/dentures  Behavioral: encourage patient to voice feelings, engage patient in daily activities, administer medication as ordered and instruct/reinforce fall program rationale  Mobility: utilize bed/chair fall alarm, ensure bed is locked and in lowest position and provide appropriate assistive device

## 2017-09-06 NOTE — RESPIRATORY CARE
COPD EDUCATION by COPD CLINICAL EDUCATOR  9/6/2017 at 6:26 AM by Terrie Perdomo     Patient reviewed by COPD education team. Patient does not qualify for COPD program.

## 2017-09-06 NOTE — CARE PLAN
Ivis Dwyer Fall Risk Assessment:     Last Known Fall: No falls  Mobility: Immobilized/requires assist of one person  Medications: Cardiovascular and central nervous system meds  Mental Status/LOC/Awareness: Awake, alert, and oriented to date, place, and person  Toileting Needs: Use of assistive device (Bedside commode, bedpan, urinal)  Volume/Electrolyte Status: No problems  Communication/Sensory: Visual (Glasses)/hearing deficit  Behavior: Appropriate behavior  Ivis Dwyer Fall Risk Total: 10  Fall Risk Level: LOW RISK    Universal Fall Precautions:  call light/belongings in reach, bed in low position and locked, wheelchairs and assistive devices out of sight, siderails up x 2, use non-slip footwear, adequate lighting, clutter free and spill free environment, educate on level of risk, educate to call for assistance    Fall Risk Level Interventions:   TRIAL ([employer], NEURO, MED NICKY 5) Low Fall Risk Interventions  Place yellow fall risk ID band on patient: verified  Provide patient/family education based on risk assessment: completed  Educate patient/family to call staff for assistance when getting out of bed: completed  Place fall precaution signage outside patient door: verifiedTRIAL (ZEturf 8, NEURO, MED NICKY 5) Moderate Fall Risk Interventions  Place yellow fall risk ID band on patient: verified  Provide patient/family education based on risk assessment : verified  Educate patient/family to call staff for assistance when getting out of bed: verified  Place fall precaution signage outside patient door: verified  Utilize bed/chair fall alarm: verified     Patient Specific Interventions:     Medication: limit combination of prn medications  Mental Status/LOC/Awareness: not applicable  Toileting: provide frquent toileting  Volume/Electrolyte Status: ensure patient remains hydrated  Communication/Sensory: update plan of care on whiteboard  Behavioral: not applicable  Mobility: ensure bed is locked and in lowest  position

## 2017-09-06 NOTE — CARE PLAN
Problem: Bronchoconstriction:  Goal: Improve in air movement and diminished wheezing    Intervention: Implement inhaled treatments  DUO QID

## 2017-09-06 NOTE — PROGRESS NOTES
Assumed care at 0715 with new graduate RN. Bedside report received from Night RN Noemi. Patient's chart and MAR reviewed. 12 hour chart check complete. Assessment complete, pt has no complaints of pain at this time. Pt is awake in bed. Pt is A & O x 4. Patient was updated on plan of care for the day. Questions answered and concerns addressed.  Pt denies any additional needs at this time. White board updated. Call light, phone and personal belongings within reach. Bed alarm on and working appropriately. Vital signs stable.

## 2017-09-06 NOTE — DOCUMENTATION QUERY
"DOCUMENTATION QUERY    PROVIDERS: Please select “Cosign w/ note”to reply to query.    To better represent the severity of illness and risk of mortality of your patient, please review the following information and exercise your independent professional judgment in responding to this query.     Low BMI 17.70,  weight loss PTA and \"the patient is extremely thin\" are noted in the progress notes and ED report. Considering the clinical findings, risk factors, and treatment, can a diagnosis be provided to support these findings?    • Mild Protein Calorie Malnutrition  • Moderate Protein Calorie Malnutrition  • Severe Protein Calorie Malnutrition  • Cachexia  • Underweight  • Findings of no clinical significance  • Other explanation of clinical findings  • Unable to determine          The medical record reflects the following:   Clinical Findings  Low BMI 17.70, \"noted to have weight loss PTA:; \"the patient is extremely thin\"; consuming <50%meals   Treatment  Dietary evaluation/monitoring/treatment; Recommendations: Supplements;document meals; monitor daily weights   Risk Factors  Age; COPD/SOB; anxiety   Location within medical record  Progress Notes and ED report     Thank you,   Marianne Alvarado RN   Clinical   Phone - [phone]          "

## 2017-09-06 NOTE — PROGRESS NOTES
Chart reviewed.  Pt is scheduled for care manager telephone visit today 9/6/17 at 10:30 AM, however currently admitted to [facility].  Placed phone call to Doris in scheduling to request that care manager telephone visit and discharge clinic appt be canceled and/or rescheduled.

## 2017-09-06 NOTE — PROGRESS NOTES
Marilee Nam and Deanna,   Dr. Munoz is coming back tomorrow at some point and that is the reason why I am messaging both of you.  He has a super small corneal abrasion which I expect to heal by tomorrow.  Can probably take off the bcl tomorrow.    Thanks,  Sofiya  [facility] Hospitalist Progress Note    Date of Service: 2017    Chief Complaint  77/F with HTN, COPD on 4L home O2, HLD, admitted for SOB x 1 week. Noted to be wheezy. Initial labs showed K 2.6, Na 133. . Trop negative. CXR showed hazy opacity on the right, with hyperinflation. Chest CT showed no airspace opacity or mass. PCT low. Started on BD, steroids, and docycycline. Noted to have afib with normal rate on EKG. K replaced.     Interval Problem Update  No acute events overnight  She remains quite anxious still  Oxygen requirements are back to her baseline of 4 L  Quite hypertensive this morning  She feels better overall    Consultants/Specialty  None    Disposition  Monitor on telemetry.        Review of Systems   Constitutional: Negative for chills and fever.   HENT: Negative for hearing loss.    Respiratory: Positive for cough and shortness of breath.    Cardiovascular: Negative for chest pain and palpitations.   Gastrointestinal: Negative for nausea and vomiting.   Genitourinary: Negative for dysuria and frequency.   Musculoskeletal: Negative for back pain and myalgias.   Skin: Negative for itching and rash.   Neurological: Positive for weakness. Negative for dizziness, tingling and headaches.   Psychiatric/Behavioral: Negative for depression and suicidal ideas. The patient is nervous/anxious.         Pertinent positives/negatives as mentioned above.     A complete review of systems was done. All other systems were negative.      Physical Exam  Laboratory/Imaging   Hemodynamics  Temp (24hrs), Av.9 °C (98.4 °F), Min:36.3 °C (97.3 °F), Max:37.3 °C (99.1 °F)   Temperature: 37 °C (98.6 °F)  Pulse  Av  Min: 74  Max: 117   Blood Pressure : (!) 197/103      Respiratory      Respiration: 16, Pulse Oximetry: 93 %, O2 Daily Delivery Respiratory : Silicone Nasal Cannula     Given By:: Mouthpiece, #MDI/DPI Given: MDI/DPI x 1, Work Of Breathing / Effort: Mild  RUL Breath Sounds: Clear, RML Breath Sounds:  Diminished, RLL Breath Sounds: Diminished, RIAZ Breath Sounds: Clear, LLL Breath Sounds: Diminished    Fluids    Intake/Output Summary (Last 24 hours) at 09/06/17 1023  Last data filed at 09/06/17 0900   Gross per 24 hour   Intake              300 ml   Output             1310 ml   Net            -1010 ml       Nutrition  Orders Placed This Encounter   Procedures   • Diet Order     Standing Status:   Standing     Number of Occurrences:   1     Order Specific Question:   Diet:     Answer:   Regular [1]     Physical Exam   Constitutional: She is oriented to person, place, and time. She appears ill. No distress.   HENT:   Head: Normocephalic and atraumatic.   Mouth/Throat: No oropharyngeal exudate.   Eyes: EOM are normal. Pupils are equal, round, and reactive to light. No scleral icterus.   Neck: Normal range of motion. Neck supple. No JVD present.   Cardiovascular: Normal rate and regular rhythm.    Pulmonary/Chest: Effort normal. No respiratory distress. She has decreased breath sounds. She has no wheezes.   Tight air entry B/L lung fields.    Abdominal: Soft. Bowel sounds are normal. She exhibits no distension. There is no tenderness.   Musculoskeletal: She exhibits no edema or tenderness.   Neurological: She is alert and oriented to person, place, and time.   Skin: Skin is warm and dry. She is not diaphoretic.   Psychiatric: She has a normal mood and affect. Her behavior is normal. Judgment and thought content normal.   Nursing note and vitals reviewed.      Recent Labs      09/03/17 1715 09/05/17   0152   WBC  6.8  10.4   RBC  4.17*  4.27   HEMOGLOBIN  12.6  12.5   HEMATOCRIT  36.9*  37.2   MCV  88.5  87.1   MCH  30.2  29.3   MCHC  34.1  33.6   RDW  37.9  38.7   PLATELETCT  173  192   MPV  9.3  9.3     Recent Labs      09/03/17   1715  09/04/17   0156  09/05/17   0152   SODIUM  133*  134*  129*   POTASSIUM  2.6*  3.8  4.1   CHLORIDE  84*  94*  97   CO2  42*  35*  26   GLUCOSE  107*  145*  138*   BUN  9  11  16    CREATININE  0.85  0.73  0.73   CALCIUM  9.2  8.6  9.1         Recent Labs      09/03/17   1715   BNPBTYPENAT  173*              Assessment/Plan     * COPD exacerbation (CMS-HCC)- (present on admission)   Assessment & Plan      - continue BD per RT protocol, home symbicort and spiriva. Continue O2 to keep sats>88%.   - continue antibiotics for acute bronchitis. Trend PCT.   -We'll transition her to oral steroids and oral antibiotics  -She appears to be at her baseline          Acute bronchitis- (present on admission)   Assessment & Plan    - no consolidation on chest CT.   - continue doxycycline, and add IV ceftriaxone for now. Check procalcitonin and trend. Send for sputum GS/culture.         Acute on chronic respiratory failure with hypoxia due to AECOPD (CMS-HCC)- (present on admission)   Assessment & Plan    - back to baseline O2 requirement.   - continue nebs, steroids, antibiotics as above. Continue O2.         Paroxysmal a-fib (CMS-HCC)- (present on admission)   Assessment & Plan    - now back to NSR.   - continue metoprolol. Discussed anticoagulation, as CHADS2=2. Pt wary about being on blood thinners given bad experience with that with her  but she will consider it. Full dose ASA for now.   - continue to monitor on telemetry.         Hypokalemia- (present on admission)   Assessment & Plan    - replaced, K now WNL.   - continue to monitor. Check Mg level and replace if low. BMP in AM.         Tobacco dependence- (present on admission)   Assessment & Plan    - counseled on smoking cessation. Continue nicotine replacement.         Anxiety disorder- (present on admission)   Assessment & Plan    - Currently home dose buspar and clonazepam.  - We'll consider increasing her Klonopin or Xanax as she remains extremely anxious today        HTN (hypertension)- (present on admission)   Assessment & Plan    - continue home metoprolol and lisinopril. Continue PRN IV labetalol for significant HTN. Continue to  trend BP.   -She was quite hypertensive this morning therefore Norvasc 5 mg daily was added            Reviewed items::  Labs reviewed and Medications reviewed  Koch catheter::  No Koch  DVT prophylaxis pharmacological::  Enoxaparin (Lovenox)  Ulcer Prophylaxis::  Not indicated  Antibiotics:  Treating active infection/contamination beyond 24 hours perioperative coverage

## 2017-09-07 NOTE — PROGRESS NOTES
Ivis Dwyer Fall Risk Assessment:     Last Known Fall: No falls  Mobility: Use of assistive device/requires assist of two people  Medications: Cardiovascular or central nervous system meds  Mental Status/LOC/Awareness: Awake, alert, and oriented to date, place, and person  Toileting Needs: No needs  Volume/Electrolyte Status: No problems  Communication/Sensory: Visual (Glasses)/hearing deficit  Behavior: Depression/anxiety  Ivis Dwyer Fall Risk Total: 9  Fall Risk Level: LOW RISK    Universal Fall Precautions:  call light/belongings in reach, bed in low position and locked, wheelchairs and assistive devices out of sight, siderails up x 2, use non-slip footwear, adequate lighting, clutter free and spill free environment, educate on level of risk, educate to call for assistance    Fall Risk Level Interventions:   TRIAL ([employer], NEURO, MED NICKY 5) Low Fall Risk Interventions  Place yellow fall risk ID band on patient: verified  Provide patient/family education based on risk assessment: verified  Educate patient/family to call staff for assistance when getting out of bed: verified  Place fall precaution signage outside patient door: verifiedTRIAL ([employer], NEURO, MED NICKY 5) Moderate Fall Risk Interventions  Place yellow fall risk ID band on patient: verified  Provide patient/family education based on risk assessment : verified  Educate patient/family to call staff for assistance when getting out of bed: verified  Place fall precaution signage outside patient door: verified  Utilize bed/chair fall alarm: verified     Patient Specific Interventions:     Medication: review medications with patient and family and limit combination of prn medications  Mental Status/LOC/Awareness: reorient patient, reinforce falls education, check on patient hourly, utilize bed/chair fall alarm and reinforce the use of call light  Toileting: provide frquent toileting and monitor intake and output/use of appropriate  interventions  Volume/Electrolyte Status: ensure patient remains hydrated and monitor abnormal lab values  Communication/Sensory: update plan of care on whiteboard and for visually impaired patients orient to their room surrounding and do not change their surroundings  Behavioral: administer medication as ordered and instruct/reinforce fall program rationale  Mobility: dangle prior to standing, utilize bed/chair fall alarm and ensure bed is locked and in lowest position

## 2017-09-07 NOTE — CARE PLAN
Problem: Nutritional:  Goal: Achieve adequate nutritional intake  Patient will consume 50% of meals   Outcome: MET Date Met: 09/07/17  Pt taking 50-75% of meals

## 2017-09-07 NOTE — CARE PLAN
Problem: Psychosocial Needs:  Goal: Level of anxiety will decrease  Outcome: PROGRESSING AS EXPECTED  Patient with continued anxiety, education on breathing techniques and medicated per MAR.

## 2017-09-07 NOTE — DISCHARGE INSTRUCTIONS
Discharge Instructions    Discharged to home by car with relative. Discharged via wheelchair, hospital escort: Yes.  Special equipment needed: Oxygen    Be sure to schedule a follow-up appointment with your primary care doctor or any specialists as instructed.     Discharge Plan:   Diet Plan: Discussed  Activity Level: Discussed  Smoking Cessation Offered: Patient Refused  Confirmed Follow up Appointment: Patient to Call and Schedule Appointment  Confirmed Symptoms Management: Discussed  Medication Reconciliation Updated: Yes  Influenza Vaccine Indication: Indicated: 65 years and older  Influenza Vaccine Given - only chart on this line when given: Influenza Vaccine Given (See MAR)    I understand that a diet low in cholesterol, fat, and sodium is recommended for good health. Unless I have been given specific instructions below for another diet, I accept this instruction as my diet prescription.   Other diet: low sodium, low cholesterol    Special Instructions: None    · Is patient discharged on Warfarin / Coumadin?   No     · Is patient Post Blood Transfusion?  No    Depression / Suicide Risk    As you are discharged from this [facility] facility, it is important to learn how to keep safe from harming yourself.    Recognize the warning signs:  · Abrupt changes in personality, positive or negative- including increase in energy   · Giving away possessions  · Change in eating patterns- significant weight changes-  positive or negative  · Change in sleeping patterns- unable to sleep or sleeping all the time   · Unwillingness or inability to communicate  · Depression  · Unusual sadness, discouragement and loneliness  · Talk of wanting to die  · Neglect of personal appearance   · Rebelliousness- reckless behavior  · Withdrawal from people/activities they love  · Confusion- inability to concentrate     If you or a loved one observes any of these behaviors or has concerns about self-harm, here's what you can do:  · Talk  about it- your feelings and reasons for harming yourself  · Remove any means that you might use to hurt yourself (examples: pills, rope, extension cords, firearm)  · Get professional help from the community (Mental Health, Substance Abuse, psychological counseling)  · Do not be alone:Call your Safe Contact- someone whom you trust who will be there for you.  · Call your local CRISIS HOTLINE 927-7432 or 137-875-8685  · Call your local Children's Mobile Crisis Response Team [place] (515) 227-8633 or wwwGeneriMed  · Call the toll free National Suicide Prevention Hotlines   · National Suicide Prevention Lifeline 090-006-LNXM (7427)  · National [employer] Line Network 800-SUICIDE (860-3015)    Chronic Obstructive Pulmonary Disease  Chronic obstructive pulmonary disease (COPD) is a common lung condition in which airflow from the lungs is limited. COPD is a general term that can be used to describe many different lung problems that limit airflow, including both chronic bronchitis and emphysema. If you have COPD, your lung function will probably never return to normal, but there are measures you can take to improve lung function and make yourself feel better.  CAUSES   · Smoking (common).  · Exposure to secondhand smoke.  · Genetic problems.  · Chronic inflammatory lung diseases or recurrent infections.  SYMPTOMS  · Shortness of breath, especially with physical activity.  · Deep, persistent (chronic) cough with a large amount of thick mucus.  · Wheezing.  · Rapid breaths (tachypnea).  · Gray or bluish discoloration (cyanosis) of the skin, especially in your fingers, toes, or lips.  · Fatigue.  · Weight loss.  · Frequent infections or episodes when breathing symptoms become much worse (exacerbations).  · Chest tightness.  DIAGNOSIS  Your health care provider will take a medical history and perform a physical examination to diagnose COPD. Additional tests for COPD may include:  · Lung (pulmonary) function  tests.  · Chest X-ray.  · CT scan.  · Blood tests.  TREATMENT   Treatment for COPD may include:  · Inhaler and nebulizer medicines. These help manage the symptoms of COPD and make your breathing more comfortable.  · Supplemental oxygen. Supplemental oxygen is only helpful if you have a low oxygen level in your blood.  · Exercise and physical activity. These are beneficial for nearly all people with COPD.  · Lung surgery or transplant.  · Nutrition therapy to gain weight, if you are underweight.  · Pulmonary rehabilitation. This may involve working with a team of health care providers and specialists, such as respiratory, occupational, and physical therapists.  HOME CARE INSTRUCTIONS  · Take all medicines (inhaled or pills) as directed by your health care provider.  · Avoid over-the-counter medicines or cough syrups that dry up your airway (such as antihistamines) and slow down the elimination of secretions unless instructed otherwise by your health care provider.  · If you are a smoker, the most important thing that you can do is stop smoking. Continuing to smoke will cause further lung damage and breathing trouble. Ask your health care provider for help with quitting smoking. He or she can direct you to community resources or hospitals that provide support.  · Avoid exposure to irritants such as smoke, chemicals, and fumes that aggravate your breathing.  · Use oxygen therapy and pulmonary rehabilitation if directed by your health care provider. If you require home oxygen therapy, ask your health care provider whether you should purchase a pulse oximeter to measure your oxygen level at home.  · Avoid contact with individuals who have a contagious illness.  · Avoid extreme temperature and humidity changes.  · Eat healthy foods. Eating smaller, more frequent meals and resting before meals may help you maintain your strength.  · Stay active, but balance activity with periods of rest. Exercise and physical activity will  help you maintain your ability to do things you want to do.  · Preventing infection and hospitalization is very important when you have COPD. Make sure to receive all the vaccines your health care provider recommends, especially the pneumococcal and influenza vaccines. Ask your health care provider whether you need a pneumonia vaccine.  · Learn and use relaxation techniques to manage stress.  · Learn and use controlled breathing techniques as directed by your health care provider. Controlled breathing techniques include:  ¨ Pursed lip breathing. Start by breathing in (inhaling) through your nose for 1 second. Then, purse your lips as if you were going to whistle and breathe out (exhale) through the pursed lips for 2 seconds.  ¨ Diaphragmatic breathing. Start by putting one hand on your abdomen just above your waist. Inhale slowly through your nose. The hand on your abdomen should move out. Then purse your lips and exhale slowly. You should be able to feel the hand on your abdomen moving in as you exhale.  · Learn and use controlled coughing to clear mucus from your lungs. Controlled coughing is a series of short, progressive coughs. The steps of controlled coughing are:  1. Lean your head slightly forward.  2. Breathe in deeply using diaphragmatic breathing.  3. Try to hold your breath for 3 seconds.  4. Keep your mouth slightly open while coughing twice.  5. Spit any mucus out into a tissue.  6. Rest and repeat the steps once or twice as needed.  SEEK MEDICAL CARE IF:  · You are coughing up more mucus than usual.  · There is a change in the color or thickness of your mucus.  · Your breathing is more labored than usual.  · Your breathing is faster than usual.  SEEK IMMEDIATE MEDICAL CARE IF:  · You have shortness of breath while you are resting.  · You have shortness of breath that prevents you from:  ¨ Being able to talk.  ¨ Performing your usual physical activities.  · You have chest pain lasting longer than 5  minutes.  · Your skin color is more cyanotic than usual.  · You measure low oxygen saturations for longer than 5 minutes with a pulse oximeter.  MAKE SURE YOU:  · Understand these instructions.  · Will watch your condition.  · Will get help right away if you are not doing well or get worse.     This information is not intended to replace advice given to you by your health care provider. Make sure you discuss any questions you have with your health care provider.     Document Released: 09/27/2006 Document Revised: 01/08/2016 Document Reviewed: 08/14/2014  [employer] Patient Education ©2016 [employer]

## 2017-09-07 NOTE — PROGRESS NOTES
Received report from day nurse shift at bedside. Patient is A&O X 4 in no acute distress. Safety measures in place, bed is locked and at lowest position, bed alarm active and working.

## 2017-09-07 NOTE — DISCHARGE SUMMARY
"CHIEF COMPLAINT ON ADMISSION  Chief Complaint   Patient presents with   • Shortness of Breath       CODE STATUS  Full Code    HPI & HOSPITAL COURSE  77 y.o. female who presented 9/3/2017 with progressive SOB x 1 week. She states that she is baseline on 4L NC at home but has been feeling like she is \"not getting enough oxygen\". She became concerned when she was no longer able to move about the house without severe SOB and a chest pressure with inspiration. She endorses 1 episode of non-bloody vomitus when she became so short of breath. States that her appetite has always been bad and she has recently lost 5-6 pounds in the last 3 months. Mostly eats salads with her son.    Despite patient crying supplement Eloxatin she continues to smoke on a daily basis. She was admitted to the telemetry unit and was treated put on treatment for COPD exacerbation including steroids and breathing treatments. During the hospitalization patient was extremely anxious. When she would become anxious her oxygen requirements would increase. We started her on different and tight anxiety medications including the ones that she is artery on at home which seemed to help. By the time of discharge she is at her baseline oxygen requirement of approximately 4 L. Despite being on high amounts of oxygen at home she continues to smoke and has no intention to stop. We'll continue with the steroids for now.  During the hospitalization she was also found to be extremely hypertensive and had to have new medications added and medications adjusted. By the time of discharge her blood pressures in the 140s systolic.  Furthermore I did discuss possible hospice care with the patient but she had a panic attack and states that she will discuss this with her PCP.     Therefore, she is discharged in guarded and stable condition with close outpatient follow-up.    SPECIFIC OUTPATIENT FOLLOW-UP  PCP in 1-2 weeks    DISCHARGE PROBLEM LIST  Principal Problem:    COPD " exacerbation (CMS-HCC) POA: Yes  Active Problems:    Hypokalemia POA: Yes    Paroxysmal a-fib (CMS-HCC) POA: Yes    Acute on chronic respiratory failure with hypoxia due to AECOPD (CMS-HCC) POA: Yes    Acute bronchitis POA: Yes    HTN (hypertension) (Chronic) POA: Yes    Anxiety disorder (Chronic) POA: Yes    Tobacco dependence POA: Yes      Overview: Smokes 2 cigarettes in morning and 2 at night.  Resolved Problems:    * No resolved hospital problems. *      FOLLOW UP  Future Appointments  Date Time Provider Department Center   9/8/2017 1:00 PM CARE MANAGER [facility]   9/12/2017 3:00 PM RICK Elder [facility]   9/18/2017 3:00 PM Mireille Benito M.D. [facility] None   11/2/2017 9:40 AM Matilde Yi M.D. [facility] None     Mireille Benito M.D.  [address]   [facility] [address]  [facility] [address]  [phone]            MEDICATIONS ON DISCHARGE   Yoko Mistry   Home Medication Instructions MAKENZIE:[id number]    Printed on:09/07/17 7950   Medication Information                      albuterol (VENTOLIN HFA) 108 (90 BASE) MCG/ACT Aero Soln inhalation aerosol  Inhale 2 Puffs by mouth every 6 hours as needed for Shortness of Breath.             amlodipine (NORVASC) 5 MG Tab  Take 2 Tabs by mouth every day.             aspirin EC (ECOTRIN) 81 MG TBEC  Take 81 mg by mouth every day.             busPIRone (BUSPAR) 5 MG Tab  Take 1 Tab by mouth every day.             cefdinir (OMNICEF) 300 MG Cap  Take 1 Cap by mouth every 12 hours.             clonazepam (KLONOPIN) 0.5 MG Tab  Take 0.25 mg by mouth every bedtime.             doxycycline monohydrate (ADOXA) 100 MG tablet  Take 1 Tab by mouth every 12 hours.             GARLIC PO  Take 1 tablet by mouth every day.             ipratropium-albuterol (DUONEB) 0.5-2.5 (3) MG/3ML nebulizer solution  INHALE 1 VIAL VIA NEBULIZER 4 TIMES A DAY             lisinopril (PRINIVIL) 20 MG Tab  TAKE 1 TABLET BY MOUTH 2 TIMES A DAY             metoprolol SR (TOPROL XL) 50 MG TABLET SR 24 HR  TAKE 1  TABLET BY MOUTH DAILY             simvastatin (ZOCOR) 20 MG Tab  Take 20 mg by mouth every evening.             SYMBICORT 160-4.5 MCG/ACT Aerosol  INHALE 2 PUFFS BY MOUTH 2 TIMES A DAY. USE SPACER. RINSE MOUTH AFTER EACH USE.             tiotropium (SPIRIVA) 18 MCG Cap  Inhale 1 Cap by mouth every day.                 DIET  Orders Placed This Encounter   Procedures   • Diet Order     Standing Status:   Standing     Number of Occurrences:   1     Order Specific Question:   Diet:     Answer:   Regular [1]       ACTIVITY  As tolerated.  Weight bearing as tolerated      CONSULTATIONS  None    PROCEDURES  None    LABORATORY  Lab Results   Component Value Date/Time    SODIUM 129 (L) 09/05/2017 01:52 AM    POTASSIUM 4.1 09/05/2017 01:52 AM    CHLORIDE 97 09/05/2017 01:52 AM    CO2 26 09/05/2017 01:52 AM    GLUCOSE 138 (H) 09/05/2017 01:52 AM    BUN 16 09/05/2017 01:52 AM    CREATININE 0.73 09/05/2017 01:52 AM    CREATININE 0.89 08/10/2011 10:45 AM        Lab Results   Component Value Date/Time    WBC 10.4 09/05/2017 01:52 AM    WBC 7.9 08/10/2011 10:45 AM    HEMOGLOBIN 12.5 09/05/2017 01:52 AM    HEMATOCRIT 37.2 09/05/2017 01:52 AM    PLATELETCT 192 09/05/2017 01:52 AM        Total time of the discharge process exceeds 31 minutes

## 2017-09-07 NOTE — PROGRESS NOTES
Ivis Dwyer Fall Risk Assessment:     Last Known Fall: No falls  Mobility: Use of assistive device/requires assist of two people  Medications: Cardiovascular or central nervous system meds  Mental Status/LOC/Awareness: Awake, alert, and oriented to date, place, and person  Toileting Needs: No needs  Volume/Electrolyte Status: No problems  Communication/Sensory: Visual (Glasses)/hearing deficit  Behavior: Depression/anxiety  Ivis Dwyer Fall Risk Total: 9  Fall Risk Level: LOW RISK    Universal Fall Precautions:  call light/belongings in reach, bed in low position and locked, siderails up x 2, wheelchairs and assistive devices out of sight, use non-slip footwear, adequate lighting, clutter free and spill free environment, educate on level of risk, educate to call for assistance    Fall Risk Level Interventions:   TRIAL ([employer], NEURO, MED NICKY 5) Low Fall Risk Interventions  Place yellow fall risk ID band on patient: verified  Provide patient/family education based on risk assessment: verified  Educate patient/family to call staff for assistance when getting out of bed: verified  Place fall precaution signage outside patient door: verifiedTRIAL ([employer] 8, NEURO, MED NICKY 5) Moderate Fall Risk Interventions  Place yellow fall risk ID band on patient: verified  Provide patient/family education based on risk assessment : verified  Educate patient/family to call staff for assistance when getting out of bed: verified  Place fall precaution signage outside patient door: verified  Utilize bed/chair fall alarm: verified     Patient Specific Interventions:     Medication: review medications with patient and family and limit combination of prn medications  Mental Status/LOC/Awareness: reinforce falls education, check on patient hourly, utilize bed/chair fall alarm, reinforce the use of call light and provide activity  Toileting: provide frquent toileting, monitor intake and output/use of appropriate interventions, instruct  patient/family on the need to call for assistance when toileting and do not leave patient unattended in bathroom/refer to toileting scripting  Volume/Electrolyte Status: ensure patient remains hydrated and monitor abnormal lab values  Communication/Sensory: update plan of care on whiteboard and ensure patient has glasses/contacts and hearing aids/dentures  Behavioral: administer medication as ordered and instruct/reinforce fall program rationale  Mobility: schedule physical activity throughout the day, utilize bed/chair fall alarm, ensure bed is locked and in lowest position, provide appropriate assistive device and collaborate with doctor for possible PT/OT consult

## 2017-09-07 NOTE — PROGRESS NOTES
Assumed care of patient bedside report received from Katelyn updated on POC, call light within reach and fall precautions in place. Bed locked and in lowest position. Patient instructed to call for assistance before getting out of bed. All questions answered, no other needs at this time.

## 2017-09-07 NOTE — CARE PLAN
Problem: Knowledge Deficit  Goal: Knowledge of disease process/condition, treatment plan, diagnostic tests, and medications will improve  Outcome: PROGRESSING AS EXPECTED  Educated patient regarding plan of care and medications given at bedside, patient verbalized understanding.

## 2017-09-07 NOTE — FACE TO FACE
Face to Face Note  -  Durable Medical Equipment    Modesto Cook M.D. - NPI: [id number]  I certify that this patient is under my care and that they had a durable medical equipment(DME)face to face encounter by myself that meets the physician DME face-to-face encounter requirements with this patient on:    Date of encounter:   Patient:                    MRN:                       YOB: 2017  Yoko Mistry  [id number]  1940     The encounter with the patient was in whole, or in part, for the following medical condition, which is the primary reason for durable medical equipment:  COPD    I certify that, based on my findings, the following durable medical equipment is medically necessary:  Oxygen.    HOME O2 Saturation Measurements:(Values must be present for Home Oxygen orders)         ,     ,         My Clinical findings support the need for the above equipment due to:  Hypoxia    Supporting Symptoms: copd

## 2017-09-07 NOTE — PROGRESS NOTES
Ativan given late. Will check back in 1 hour to reassess CIWA. Difficult to assess CIWA due to patient unwilling to answer assessment questions.

## 2017-09-07 NOTE — PROGRESS NOTES
Chart reviewed.  Pt is scheduled for care manager telephone visit today 9/7/17 at 1:00 PM, however currently admitted to [facility].  Placed phone call to Sarah in scheduling to request that care manager telephone visit and discharge clinic appt be canceled and/or rescheduled

## 2017-09-07 NOTE — DISCHARGE PLANNING
Home health referral has been sent to [facility] and call placed to Eva ([facility]) regarding the home health order.

## 2017-09-07 NOTE — FACE TO FACE
Face to Face Supporting Documentation - Home Health    The encounter with this patient was in whole or in part the primary reason for home health admission.    Date of encounter:   Patient:                    MRN:                       YOB: 2017  Yoko Mistry  [id number]  1940     Home health to see patient for:  Skilled Nursing care for assessment, interventions & education    Skilled need for:  Exacerbation of Chronic Disease State copd    Skilled nursing interventions to include:  Medication and disease management    Homebound status evidenced by:  Have a condition such that leaving his or her home is medically contraindicated. Leaving home requires a considerable and taxing effort. There is a normal inability to leave the home.    Community Physician to provide follow up care: Mireille Benito M.D.     Optional Interventions? No      I certify the face to face encounter for this home health care referral meets the CMS requirements and the encounter/clinical assessment with the patient was, in whole, or in part, for the medical condition(s) listed above, which is the primary reason for home health care. Based on my clinical findings: the service(s) are medically necessary, support the need for home health care, and the homebound criteria are met.  I certify that this patient has had a face to face encounter by myself.  Modesto Cook M.D. - NPI: [id number]

## 2017-09-07 NOTE — CARE PLAN
Problem: Oxygenation:  Goal: Maintain adequate oxygenation dependent on patient condition    Intervention: Levels of oxygenation will improve to baseline  Patient on 4LPM nasal cannula (home reg)      Problem: Bronchoconstriction:  Goal: Improve in air movement and diminished wheezing    Intervention: Implement inhaled treatments  DUO QID (home reg)

## 2017-09-08 PROBLEM — I16.0 HYPERTENSIVE URGENCY: Status: ACTIVE | Noted: 2017-01-01

## 2017-09-08 PROBLEM — J44.9 END STAGE COPD (HCC): Status: ACTIVE | Noted: 2017-01-01

## 2017-09-08 PROBLEM — Z72.0 TOBACCO ABUSE: Status: ACTIVE | Noted: 2017-01-01

## 2017-09-08 PROBLEM — D72.829 LEUKOCYTOSIS: Status: ACTIVE | Noted: 2017-01-01

## 2017-09-08 PROBLEM — Z71.89 ADVANCE CARE PLANNING: Status: ACTIVE | Noted: 2017-01-01

## 2017-09-08 PROBLEM — E87.1 HYPONATREMIA: Status: ACTIVE | Noted: 2017-01-01

## 2017-09-08 NOTE — PROGRESS NOTES
09/08/2017 1300 - Discharge Outreach - patient answered, but states she can't talk now - states she can't catch her breath - asked for me to call back tomorrow. Advised if she is having trouble breathing to go to ED.   09/09/2017 0859 - Patient readmitted to hospital. No call made.

## 2017-09-08 NOTE — PROGRESS NOTES
Ivis Dwyer Fall Risk Assessment:     Last Known Fall: No falls  Mobility: Immobilized/requires assist of one person  Medications: Cardiovascular or central nervous system meds  Mental Status/LOC/Awareness: Awake, alert, and oriented to date, place, and person  Toileting Needs: No needs  Volume/Electrolyte Status: No problems  Communication/Sensory: Visual (Glasses)/hearing deficit  Behavior: Appropriate behavior  Ivis Dwyer Fall Risk Total: 7  Fall Risk Level: LOW RISK    Universal Fall Precautions:  call light/belongings in reach, bed in low position and locked, siderails up x 2, use non-slip footwear, adequate lighting, clutter free and spill free environment, educate on level of risk, educate to call for assistance    Fall Risk Level Interventions:   TRIAL ([employer], NEURO, MED NICKY 5) Low Fall Risk Interventions  Place yellow fall risk ID band on patient: verified  Provide patient/family education based on risk assessment: completed  Educate patient/family to call staff for assistance when getting out of bed: completed  Place fall precaution signage outside patient door: verifiedTRIAL ([employer], NEURO, MED NICKY 5) Moderate Fall Risk Interventions  Place yellow fall risk ID band on patient: verified  Provide patient/family education based on risk assessment : verified  Educate patient/family to call staff for assistance when getting out of bed: verified  Place fall precaution signage outside patient door: verified  Utilize bed/chair fall alarm: verified     Patient Specific Interventions:     Medication: review medications with patient and family  Mental Status/LOC/Awareness: reinforce the use of call light  Toileting: provide frquent toileting  Volume/Electrolyte Status: advance diet as tolerated  Communication/Sensory: update plan of care on whiteboard  Behavioral: administer medication as ordered  Mobility: utilize bed/chair fall alarm

## 2017-09-08 NOTE — PROGRESS NOTES
Patient discharged home, IV and tele box removed, discharge instructions provided to patient and reviewed with them. All questions answered, patient provided copy of discharge instructions and instructed on when to F/U with MD. Patient wheeled off unit. Prescriptions provided to patient.

## 2017-09-09 NOTE — FLOWSHEET NOTE
09/09/17 0323   Events/Summary/Plan   Events/Summary/Plan svn given pt doing well    Non-Invasive Resp Device Site Inspection Completed Intact   Interdisciplinary Plan of Care-Goals (Indications)   Obstructive Ventilatory Defect or Pulmonary Disease without Obvious Obstruction History / Diagnosis   Interdisciplinary Plan of Care-Outcomes    Bronchodilator Outcome Diminished Wheezing and Volume of Air Movement Increased;Patient at Stable Baseline   Education   Education Yes - Pt. / Family has been Instructed in Trach Care   RT Assessment of Delivered Medications   Evaluation of Medication Delivery Daily Yes-- Pt /Family has been Instructed in use of Respiratory Medications and Adverse Reactions   SVN Group   #SVN Performed Yes   Given By: Mouthpiece   Date SVN Last Changed 09/09/17   Date SVN Next Change Due (Q 7 Days) 10/09/17   Respiratory WDL   Respiratory (WDL) X   Chest Exam   Work Of Breathing / Effort Mild   Respiration (!) 22   Pulse 80   Heart Rate (Monitored) 82   Chest Observation Barrel Chest   Breath Sounds   Pre/Post Intervention Pre Intervention Assessment   RUL Breath Sounds Diminished   RML Breath Sounds Diminished   RLL Breath Sounds Diminished   RIAZ Breath Sounds Diminished   LLL Breath Sounds Diminished   Oximetry   #Pulse Oximetry (Single Determination) Yes   Oxygen   Home O2 Use Prior To Admission? Yes   Home O2 LPM Flow 4 LPM   Home O2 Delivery Method Silicone Nasal Cannula   Home O2 Frequency of Use Continuous   Pulse Oximetry 98 %   O2 (LPM) 4   O2 Daily Delivery Respiratory  Nasal Cannula

## 2017-09-09 NOTE — CONSULTS
"Reason for PC Consult: Advance Care Planning    Consulted by:   Dr. Duggan    Assessment:  General:   77 year old female admitted for end stage COPD on 9/8/17. Pt has a history of COPD, 4L O2 baseline at home, macular degeneration, HTN, anxiety, chickenpox, depression, [place] measles, hyperlipidemia, Mumps, and smallpox. Pt was recently discharged on 9/7/17 for pneumonia and COPD exacerbation. Presented to the ED for shortness of breath and increase work of breathing.     Dyspnea: Yes, 95% on 5L NC  Last BM: 09/08/17  Pain: Yes, \"Muslcel pain from struggeling to breath\"  Depression: Mood appropriate for situation      Spiritual:  Is Anglican or spirituality important for coping with this illness? Yes   Has a  or spiritual provider visit been requested? No    Palliative Performance Scale: 50%    Advance Directive: None on File    DPOA: None on File, MECHELLE Mistry ([phone])   POLST: None on File    Code Status: Full      Outcome:  PC RN introduced self and role of PC. Discussed pt's understanding of current clinical picture, pt verbalized that she has end stage COPD and that she keeps coming into the hospital because she can't breath. PC RN discussed how this has impacted pt's life, pt states that she \"can't keep living like this\". PC RN discussed healthcare wishes, life support and life prolonging treatment. Pt asked if she would have to live in the hospital with life support. PC RN discussed being in the ICU while on the ventilator and if weaning was unsuccessful and she needed life support for the rest of her life for however long that would be, pt would be transported to a facility that could accomodates her long term needs on the ventilator. Pt stated, \"I don't want to be kept alive on machines\" Pt then discussed that she lost her  in 2000 from cancer and her other son in 2001 from a car accident. All she has now is her son Yoav, PC RN asked if pt ever expressed her wishes via advanced " directive, pt states she has and Yoav should have a copy. PC RN asked if she could contact Yoav to have him bring a copy into the hospital . Pt is OK with this and would like to finish conversation with Yoav present, Pt expressed concern for leaving Yoav alone if she . Pt became anxious, in acute stress. PC RN instructed pt to calmly breath in her nose and out her mouth, notified bedside RN of panic attack, and help pt's hand while calmly talking to her. Pt is tearful and sad about loosing her family and her son losing his mother now. PC RN suggested to finish conversation with son present, pt agrees.    Called Yoav ([phone]), he will be at bedside about 1300 today. He will bring pt's AD as well.         Plan:   Meet with Yoav and pt once Yoav arrives.    Thank you for allowing Palliative Care to participate in this patient's care. Please feel free to call [phone] with any questions or concerns.

## 2017-09-09 NOTE — PROGRESS NOTES
2 RN SKIN ASSESSMENT completed with DAMEON Almonte    Ears pink, blanching.  Elbows pink, blanching.  Scattered bruising to bilateral forearms.  Heels red, blanching, floated on pillow.  Feet dry, flaky.  Sacrum very slow to edelmira.  Waffle mattress overlay placed, sacral mepilex placed, Q2 turns initiated.

## 2017-09-09 NOTE — ASSESSMENT & PLAN NOTE
End stage COPD w  Exacerbation- improved.   PO cefdinir and doxycyline- complete 7 day course for pna coverage/ bronchitis .  RT protocol  Steroids.   PT eval.

## 2017-09-09 NOTE — PROGRESS NOTES
Confirmed with pt as to her dc instructions from 9/3/17 for Rx of amlodipine, cefdinir, doxycycline and Spiriva pt states that she filled these meds and was taking them as directed.

## 2017-09-09 NOTE — PROGRESS NOTES
Ivis Dwyer Fall Risk Assessment:     Last Known Fall: No falls  Mobility: Dizziness/generalized weakness  Medications: Cardiovascular and central nervous system meds  Mental Status/LOC/Awareness: Awake, alert, and oriented to date, place, and person  Toileting Needs: Use of assistive device (Bedside commode, bedpan, urinal)  Volume/Electrolyte Status: Use of IV fluids/tube feeds  Communication/Sensory: Visual (Glasses)/hearing deficit  Behavior: Appropriate behavior  Ivis Dwyer Fall Risk Total: 11  Fall Risk Level: MODERATE RISK    Universal Fall Precautions:  call light/belongings in reach, bed in low position and locked, wheelchairs and assistive devices out of sight, siderails up x 2, adequate lighting, educate on level of risk, educate to call for assistance, clutter free and spill free environment, use non-slip footwear    Fall Risk Level Interventions:    TRIAL ([employer], NEURO, MED NICKY 5) Moderate Fall Risk Interventions  Place yellow fall risk ID band on patient: completed  Provide patient/family education based on risk assessment : completed  Educate patient/family to call staff for assistance when getting out of bed: completed  Place fall precaution signage outside patient door: completed  Utilize bed/chair fall alarm: completed     Patient Specific Interventions:     Medication: review medications with patient and family, assess for medications that can be discontinued or dosage decreased and limit combination of prn medications  Mental Status/LOC/Awareness: reinforce falls education, check on patient hourly, utilize bed/chair fall alarm and reinforce the use of call light  Toileting: instruct patient/family on the need to call for assistance when toileting  Volume/Electrolyte Status: monitor abnormal lab values and ensure IV fluids are appropriate  Communication/Sensory: update plan of care on whiteboard and ensure proper positioning when transferrng/ambulating  Behavioral: administer medication as  ordered and instruct/reinforce fall program rationale  Mobility: utilize bed/chair fall alarm and ensure bed is locked and in lowest position

## 2017-09-09 NOTE — H&P
Hospital Medicine History and Physical    Date of Service  9/8/2017    Chief Complaint  Chief Complaint   Patient presents with   • Shortness of Breath       History of Presenting Illness  77 y.o. 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 Patient was discharged home on home oxygen as well as oral steroids and oral antibiotics. Patient reports smoking a cigarette once she got home. Patient should then noted increasing shortness of breath the next day. Patient reports expiratory wheezing with minimal cough negative sputum. Patient does appear very anxious on exam. Patient has minimal airway movement throughout her lungs. Patient is noted to have pursed lip breathing on exam. Patient was requiring 5 L oxygen supplementation which has been tapered back to her baseline on 4 L.    On prior admission, per Dr. Cook there was a discussion regarding code status as well as advanced directives. During this discussion patient began to get anxious and developed a panic attack.    I did have a repeat discussion with patient during this evaluation. Patient states understanding of recommendations for tobacco cessation. However would like to continue full code status.    Chest x-ray does not reveal any new infiltrate  Patient denies any chest pain fevers chills nausea or abdominal pain or dysuria.   Primary Care Physician  Mireille Benito M.D.    Consultants  None    Code Status  Full    Review of Systems  Review of Systems   Constitutional: Negative for chills, diaphoresis, fever and malaise/fatigue.   HENT: Negative for congestion and hearing loss.    Respiratory: Positive for cough, shortness of breath and wheezing. Negative for sputum production.    Cardiovascular: Negative for chest pain, palpitations and leg swelling.   Gastrointestinal: Negative for abdominal pain, constipation, diarrhea, nausea and vomiting.    Genitourinary: Negative for dysuria and flank pain.   Musculoskeletal: Negative for back pain, joint pain and myalgias.   Skin: Negative for itching and rash.   Neurological: Positive for weakness. Negative for dizziness, tremors, sensory change, speech change, focal weakness and headaches.   Psychiatric/Behavioral: Negative for depression and memory loss. The patient is nervous/anxious.         Past Medical History  Past Medical History:   Diagnosis Date   • Macular degeneration 02/17   • HTN (hypertension) 7/11/2011   • COPD (chronic obstructive pulmonary disease) (CMS-[facility]) 7/11/2011   • Anxiety disorder 7/11/2011   • Chickenpox    • COPD    • Depression    • [place] measles    • Hyperlipidemia    • Hypertension    • Influenza    • Mumps    • Smallpox        Surgical History  Past Surgical History:   Procedure Laterality Date   • FEMORAL POPLITEAL ANGIOPLASTY WITH STENT  2009   • ABDOMINAL HYSTERECTOMY TOTAL     • CARDIOVASCULAR      cardio ablation   • CHOLECYSTECTOMY     • HYSTERECTOMY LAPAROSCOPY         Medications  No current facility-administered medications on file prior to encounter.      Current Outpatient Prescriptions on File Prior to Encounter   Medication Sig Dispense Refill   • tiotropium (SPIRIVA) 18 MCG Cap Inhale 1 Cap by mouth every day. 30 Cap 0   • doxycycline monohydrate (ADOXA) 100 MG tablet Take 1 Tab by mouth every 12 hours. 6 Tab 0   • cefdinir (OMNICEF) 300 MG Cap Take 1 Cap by mouth every 12 hours. 6 Cap 0   • amlodipine (NORVASC) 5 MG Tab Take 2 Tabs by mouth every day. 30 Tab 0   • clonazepam (KLONOPIN) 0.5 MG Tab Take 0.25 mg by mouth every bedtime.     • ipratropium-albuterol (DUONEB) 0.5-2.5 (3) MG/3ML nebulizer solution INHALE 1 VIAL VIA NEBULIZER 4 TIMES A DAY 90 mL 5   • busPIRone (BUSPAR) 5 MG Tab Take 1 Tab by mouth every day. 30 Tab 5   • SYMBICORT 160-4.5 MCG/ACT Aerosol INHALE 2 PUFFS BY MOUTH 2 TIMES A DAY. USE SPACER. RINSE MOUTH AFTER EACH USE. 1 Inhaler 5   • metoprolol  SR (TOPROL XL) 50 MG TABLET SR 24 HR TAKE 1 TABLET BY MOUTH DAILY 90 Tab 0   • lisinopril (PRINIVIL) 20 MG Tab TAKE 1 TABLET BY MOUTH 2 TIMES A  Tab 1   • albuterol (VENTOLIN HFA) 108 (90 BASE) MCG/ACT Aero Soln inhalation aerosol Inhale 2 Puffs by mouth every 6 hours as needed for Shortness of Breath. 1 Inhaler 2   • GARLIC PO Take 1 tablet by mouth every day.     • simvastatin (ZOCOR) 20 MG Tab Take 20 mg by mouth every evening.     • aspirin EC (ECOTRIN) 81 MG TBEC Take 81 mg by mouth every day.         Family History  Family History   Problem Relation Age of Onset   • Heart Disease Mother      aortic stenosis   • Heart Disease Father    • Cancer Brother        Social History  Social History   Substance Use Topics   • Smoking status: Current Every Day Smoker     Packs/day: 0.25     Years: 40.00     Types: Cigarettes   • Smokeless tobacco: Never Used      Comment: Down to 5 cigs a day per patient  Started in her 30s   • Alcohol use No       Allergies  Allergies   Allergen Reactions   • Citalopram Unspecified     Low sodium        Physical Exam  Laboratory   Hemodynamics  Temp (24hrs), Av.5 °C (99.5 °F), Min:37.5 °C (99.5 °F), Max:37.5 °C (99.5 °F)   Temperature: 37.5 °C (99.5 °F)  Pulse  Av.5  Min: 79  Max: 110 Heart Rate (Monitored): 100  Blood Pressure : (!) 179/164, NIBP: 141/74      Respiratory      Respiration: 19, Pulse Oximetry: 96 %, O2 Daily Delivery Respiratory : Nasal Cannula     Given By:: Mouthpiece, Work Of Breathing / Effort: Moderate  RUL Breath Sounds: Diminished, RML Breath Sounds: Diminished, RLL Breath Sounds: Diminished, RIAZ Breath Sounds: Diminished, LLL Breath Sounds: Diminished    Physical Exam   Constitutional: She is oriented to person, place, and time. She appears well-nourished. She appears distressed.   Frail, thin, elderly   HENT:   Head: Normocephalic and atraumatic.   Nose: Nose normal.   Eyes: EOM are normal. Pupils are equal, round, and reactive to light. Right eye  exhibits no discharge. Left eye exhibits no discharge.   Neck: Normal range of motion. Neck supple. No JVD present. No thyromegaly present.   Cardiovascular: Normal rate and intact distal pulses.    No murmur heard.  Pulmonary/Chest: No respiratory distress. She has wheezes. She has no rales. She exhibits no tenderness.   Decreased breath sounds bilaterally  Poor airway movement   Abdominal: Soft. Bowel sounds are normal. She exhibits no distension. There is no tenderness.   Musculoskeletal: She exhibits tenderness. She exhibits no edema.   Neurological: She is alert and oriented to person, place, and time. No cranial nerve deficit. She exhibits normal muscle tone. Coordination normal.   Skin: Skin is warm and dry. No rash noted. She is not diaphoretic. No erythema. No pallor.   Psychiatric: She has a normal mood and affect. Her behavior is normal. Thought content normal.   Nursing note and vitals reviewed.      Recent Labs      09/08/17   1753   WBC  14.4*   RBC  4.99   HEMOGLOBIN  14.7   HEMATOCRIT  42.8   MCV  85.8   MCH  29.5   MCHC  34.3   RDW  38.7   PLATELETCT  309   MPV  9.7     Recent Labs      09/08/17   1753   SODIUM  132*   POTASSIUM  3.9   CHLORIDE  101   CO2  20   GLUCOSE  152*   BUN  20   CREATININE  0.75   CALCIUM  9.8     Recent Labs      09/08/17   1753   ALTSGPT  28   ASTSGOT  28   ALKPHOSPHAT  32   TBILIRUBIN  0.8   GLUCOSE  152*                 Lab Results   Component Value Date    TROPONINI <0.01 09/08/2017     Urinalysis:    Lab Results  Component Value Date/Time   SPECGRAVITY 1.006 12/12/2011 0958   GLUCOSEUR Negative 12/12/2011 0958   KETONES Negative 12/12/2011 0958   NITRITE Negative 12/12/2011 0958   WBCURINE >150 (A) 10/05/2011 0019   RBCURINE >150 (A) 10/05/2011 0019   BACTERIA Few (A) 10/05/2011 0019   EPITHELCELL Rare 10/05/2011 0019        Imaging  DX-CHEST-PORTABLE (1 VIEW)   Final Result      1.  Hyperinflation consistent with COPD.   2.  No pneumonia or pneumothorax.       DX-CHEST-2 VIEWS    (Results Pending)        Assessment/Plan     I anticipate this patient will require at least two midnights for appropriate medical management, necessitating inpatient admission.    * COPD exacerbation (CMS-HCC)- (present on admission)   Assessment & Plan    With chronic hypoxia requiring 4 L home oxygen at baseline  With recurrent admission, status post discharge September 7  End-stage COPD  With ongoing tobacco abuse  Compensated by anxiety disorder  Chest x-ray with no new infiltrates  We will admit patient to the telemetry inpatient unit  The well be part IV steroids  RT protocol and oxygen supplementation as needed  We'll continue home oral by mouth antibiotics, patient does not appear to have a new pneumonia  Patient will benefit from pulmonary evaluation as well as palliative care evaluation  Ativan when necessary        Acute on chronic respiratory failure with hypoxia due to AECOPD (CMS-HCC)- (present on admission)   Assessment & Plan    Increasing hypoxia requiring 5 L oxygen supplementation, with baseline oxygen need to 4 L  Taper oxygen as tolerated        Peripheral vascular disease (CMS-HCC)- (present on admission)   Assessment & Plan    Continue all medications        Tobacco dependence- (present on admission)   Assessment & Plan    Advised cessation  Nicotine patch  Advise tobacco cessation, time spent >15 min, bill code [id number]  Pt interested in cessation        Paroxysmal a-fib (CMS-HCC)- (present on admission)   Assessment & Plan    Rate controlled        Advance care planning   Assessment & Plan    End-stage COPD we will consult palliative care  Patient wishes to remain full code at this time  I discussed advance care planning with the patient's family for at least 26 minutes, including diagnosis, prognosis, plan of care, risks and benefits of any therapies that could be offered, as well as alternatives including palliation and hospice, as appropriate. BILL CODE [id number].         Tobacco abuse   Assessment & Plan    We'll start patient on a nicotine patch  Patient is smoking 3 cigarettes a day  At this time patient is not ready to quit  We have advised cessation  Advise tobacco cessation, time spent >15 min, bill code [id number]  Pt interested in cessation        Leukocytosis   Assessment & Plan    Likely secondary to steroids  Chest x-ray is negative for new infiltrate  Continue to monitor  Continue home medications        Hyponatremia   Assessment & Plan    Continue IV fluid  Monitor        Hypertensive urgency   Assessment & Plan    Continue home antihypertensives  Labetalol as needed        Anxiety disorder- (present on admission)   Assessment & Plan    As above        HTN (hypertension)- (present on admission)   Assessment & Plan    Uncontrolled  Essential            VTE prophylaxis:Lovenox .

## 2017-09-09 NOTE — CARE PLAN
Problem: Oxygenation:  Goal: Maintain adequate oxygenation dependent on patient condition  Outcome: PROGRESSING AS EXPECTED    Intervention: Manage oxygen therapy by monitoring pulse oximetry and/or ABG values  6lpm NC, titrate as indicated pt home baseline is 4lpm       Problem: Bronchoconstriction:  Goal: Improve in air movement and diminished wheezing  Outcome: PROGRESSING AS EXPECTED    Intervention: Implement inhaled treatments  DUO Q4, Spiriva QD and Symbicort BID

## 2017-09-09 NOTE — ED NOTES
Brought in by EMS from home.  Was seen here last night, diagnosed with PNA and sent home.  Comes back today with worsening symptoms.  SOB, 94% on 4 liters by remsa found at home.  Inhaler providing no relief.  Patient very sob, difficulty talking.  Increased WOB.  Tachy on the monitor.

## 2017-09-09 NOTE — CARE PLAN
Problem: Knowledge Deficit  Goal: Knowledge of disease process/condition, treatment plan, diagnostic tests, and medications will improve  Outcome: PROGRESSING AS EXPECTED  POC discussed, all questions answered, no additional concerns at this time.    Problem: Pain Management  Goal: Pain level will decrease to patient's comfort goal  Outcome: PROGRESSING AS EXPECTED  Pt medicated per MAR, now resting comfortably.

## 2017-09-09 NOTE — ASSESSMENT & PLAN NOTE
Now at 4L at baseline  w recent  ongoing tobacco abuse.-- improved symptoms  Steroids.  Prn Morphine for anxiety.

## 2017-09-09 NOTE — PROGRESS NOTES
Ivis Dwyer Fall Risk Assessment:     Last Known Fall: No falls  Mobility: Dizziness/generalized weakness  Medications: Cardiovascular and central nervous system meds  Mental Status/LOC/Awareness: Awake, alert, and oriented to date, place, and person  Toileting Needs: Use of assistive device (Bedside commode, bedpan, urinal)  Volume/Electrolyte Status: Use of IV fluids/tube feeds  Communication/Sensory: Visual (Glasses)/hearing deficit  Behavior: Appropriate behavior  Ivis Dwyer Fall Risk Total: 11  Fall Risk Level: MODERATE RISK    Universal Fall Precautions:  call light/belongings in reach, bed in low position and locked, wheelchairs and assistive devices out of sight, siderails up x 2, use non-slip footwear, adequate lighting, clutter free and spill free environment, educate on level of risk, educate to call for assistance    Fall Risk Level Interventions:    TRIAL (TELE 8, NEURO, MED NICKY 5) Moderate Fall Risk Interventions  Place yellow fall risk ID band on patient: verified  Provide patient/family education based on risk assessment : verified  Educate patient/family to call staff for assistance when getting out of bed: verified  Place fall precaution signage outside patient door: verified  Utilize bed/chair fall alarm: verified     Patient Specific Interventions:     Medication: review medications with patient and family and limit combination of prn medications  Mental Status/LOC/Awareness: reinforce falls education, check on patient hourly, utilize bed/chair fall alarm and reinforce the use of call light  Toileting: provide frquent toileting, monitor intake and output/use of appropriate interventions, instruct patient/family on the use of grab bars and instruct patient/family on the need to call for assistance when toileting  Volume/Electrolyte Status: monitor blood sugars and maintain appropriate blood sugar levels if diabetic, administer medications as ordered for nausea and vomiting, monitor abnormal  lab values and ensure IV fluids are appropriate  Communication/Sensory: update plan of care on whiteboard, ensure proper positioning when transferrng/ambulating and for visually impaired patients orient to their room surrounding and do not change their surroundings  Behavioral: encourage patient to voice feelings, engage patient in daily activities, administer medication as ordered and instruct/reinforce fall program rationale  Mobility: provide comfort measures during transport, utilize bed/chair fall alarm, ensure bed is locked and in lowest position and provide appropriate assistive device

## 2017-09-09 NOTE — ASSESSMENT & PLAN NOTE
Palliative care follwing, recently changed to  DNR, not yet ready for hospice.   Working on placement.

## 2017-09-09 NOTE — PROGRESS NOTES
Patient admitted to -836/2 . Tele box on, patient assessed, vital signs stable. Oriented patient to room, skylight, and how to use call light. Call light within reach, bed alarm on, treaded socks on.

## 2017-09-09 NOTE — CARE PLAN
Problem: Safety  Goal: Will remain free from falls  Outcome: PROGRESSING AS EXPECTED  Discussed safety precautions that were in place. Bed alarm is on and call light placed within reach. Pt verbalized understanding.    Problem: Respiratory:  Goal: Respiratory status will improve  Outcome: PROGRESSING AS EXPECTED  Educated pt on the importance of coughing and deep breathing and use of incentive spirometer. Pt verbalized understanding.

## 2017-09-09 NOTE — PALLIATIVE CARE
Palliative Care follow-up  Pt's son Yoav is at bedside, He provided pt's directive, PC RN obtained a copy and uploaded into EMR. PC RN discussed conversation pt had regarding her healthcare wishes. Discussed hospice in detail and code status, answered questions.  Yoav feels that pt is able to get better from the pneumonia and go home, pt was able to manage her COPD at home quite well. PC RN discussed pt's wishes of CPR and life support, as per her directive she stated that she does not want life sustaining or prolonging treatment in the case of terminal illness or condition. Yoav would like to talk with physician with updates on pt's clinical status and code status.      Updated:   Hosptialist RN    Plan:   MD to provide updates for family.     Thank you for allowing Palliative Care to participate in this patient's care. Please feel free to call [phone] with any questions or concerns.

## 2017-09-10 NOTE — CARE PLAN
Problem: Safety  Goal: Will remain free from falls  Outcome: PROGRESSING AS EXPECTED  Discussed safety precautions with pt. Bed alarm on and call light within reach. Pt verbalized understanding.

## 2017-09-10 NOTE — DIETARY
Nutrition Services - Poor PO/Wt loss/Low BMI    76 YO F admitted r/t SOB and end stage COPD. PMH significant for COPD, depression, HLD and multiple vaccine-preventable illnesses. Dx: A-fib, PVD, end stage COPD, hyponatremia, hypertensive urgency, HTN    Medication reviewed. Labs reviewed -  HbA1c 6.1, Na 134, glucose 129 (on prednisone). No pressure areas or edema documented at this time. Last BM 9/9.     Weight is 51.7kg and BMI is 17.85kg/m2, which is underweight. Pt reports losing 0.9kg since Nov 16 (1.7%). This is not a significant weight loss.  Pt reports chronic poor appetite, but feels as if her appetite is coming back. Appearance is emaciated and positive for clavicle wasting. PO intake <50-75% x 1 week and appearance indicative of moderate to severe malnutrition. Pt is agreeable to yogurt and boost glucose control.    PO intake is poor on a cardiac diabetic diet.     Plan/Recommendation    Consider replacing sodium as necessary      Encourage PO intake.     Will provide triple zero greek yogurt with meals.     Recommend boost glucose control TID between meals to provide additional Kcal/protein - nursing aware.     Nutrition Rep to see patient daily for meal preferences. Please document PO intake as percentage of meals consumed.     RD following

## 2017-09-10 NOTE — CARE PLAN
Problem: Safety  Goal: Will remain free from falls  Outcome: PROGRESSING AS EXPECTED  Pt compliant with call bell usage. Due to respiratory status, pt compliant with using BSC or bedpan. Frequent rounding on pt to ensure pt needs are met and for toileting.

## 2017-09-10 NOTE — PROGRESS NOTES
Ivis Dwyer Fall Risk Assessment:     Last Known Fall: No falls  Mobility: Use of assistive device/requires assist of two people  Medications: Cardiovascular and central nervous system meds  Mental Status/LOC/Awareness: Awake, alert, and oriented to date, place, and person  Toileting Needs: Use of assistive device (Bedside commode, bedpan, urinal)  Volume/Electrolyte Status: Use of IV fluids/tube feeds  Communication/Sensory: Visual (Glasses)/hearing deficit  Behavior: Depression/anxiety  Ivis Dwyer Fall Risk Total: 14  Fall Risk Level: MODERATE RISK    Universal Fall Precautions:  call light/belongings in reach, bed in low position and locked, wheelchairs and assistive devices out of sight, siderails up x 2, use non-slip footwear, adequate lighting, clutter free and spill free environment, educate on level of risk, educate to call for assistance    Fall Risk Level Interventions:    TRIAL (TELE 8, NEURO, MED NICKY 5) Moderate Fall Risk Interventions  Place yellow fall risk ID band on patient: verified  Provide patient/family education based on risk assessment : completed  Educate patient/family to call staff for assistance when getting out of bed: completed  Place fall precaution signage outside patient door: verified  Utilize bed/chair fall alarm: completed     Patient Specific Interventions:     Medication: not applicable  Mental Status/LOC/Awareness: reorient patient, reinforce falls education, check on patient hourly, utilize bed/chair fall alarm and reinforce the use of call light  Toileting: consider obtaining elevated toilet seat or bedside commode (BSC), provide frquent toileting, monitor intake and output/use of appropriate interventions, instruct patient/family on the need to call for assistance when toileting and do not leave patient unattended in bathroom/refer to toileting scripting  Volume/Electrolyte Status: monitor blood sugars and maintain appropriate blood sugar levels if diabetic, monitor  abnormal lab values and ensure IV fluids are appropriate  Communication/Sensory: update plan of care on whiteboard, ensure proper positioning when transferrng/ambulating and ensure patient has glasses/contacts and hearing aids/dentures  Behavioral: engage patient in daily activities, administer medication as ordered and instruct/reinforce fall program rationale  Mobility: schedule physical activity throughout the day, provide comfort measures during transport, utilize bed/chair fall alarm, ensure bed is locked and in lowest position, provide appropriate assistive device and instruct patient to exit bed on their strongest side

## 2017-09-10 NOTE — PROGRESS NOTES
Ivis Dwyer Fall Risk Assessment:     Last Known Fall: No falls  Mobility: Use of assistive device/requires assist of two people  Medications: Cardiovascular and central nervous system meds  Mental Status/LOC/Awareness: Awake, alert, and oriented to date, place, and person  Toileting Needs: Use of assistive device (Bedside commode, bedpan, urinal)  Volume/Electrolyte Status: Use of IV fluids/tube feeds  Communication/Sensory: Visual (Glasses)/hearing deficit  Behavior: Depression/anxiety  Ivis Dwyer Fall Risk Total: 14  Fall Risk Level: MODERATE RISK    Universal Fall Precautions:  call light/belongings in reach, wheelchairs and assistive devices out of sight, bed in low position and locked, siderails up x 2, use non-slip footwear, adequate lighting, clutter free and spill free environment, educate on level of risk, educate to call for assistance    Fall Risk Level Interventions:    TRIAL (TELE 8, NEURO, MED NICKY 5) Moderate Fall Risk Interventions  Place yellow fall risk ID band on patient: verified  Provide patient/family education based on risk assessment : verified  Educate patient/family to call staff for assistance when getting out of bed: verified  Place fall precaution signage outside patient door: verified  Utilize bed/chair fall alarm: verified     Patient Specific Interventions:     Medication: review medications with patient and family, limit combination of prn medications and provide patients who received diuretics/laxatives frequent toileting  Mental Status/LOC/Awareness: reinforce falls education, encourage family to stay with patient, check on patient hourly, utilize bed/chair fall alarm and reinforce the use of call light  Toileting: provide frquent toileting and instruct patient/family on the use of grab bars  Volume/Electrolyte Status: ensure patient remains hydrated, monitor blood sugars and maintain appropriate blood sugar levels if diabetic and administer medications as ordered for nausea  and vomiting  Communication/Sensory: update plan of care on whiteboard, ensure proper positioning when transferrng/ambulating, ensure patient has glasses/contacts and hearing aids/dentures and for visually impaired patients orient to their room surrounding and do not change their surroundings  Behavioral: encourage patient to voice feelings, administer medication as ordered and instruct/reinforce fall program rationale  Mobility: schedule physical activity throughout the day, utilize bed/chair fall alarm, ensure bed is locked and in lowest position and provide appropriate assistive device

## 2017-09-10 NOTE — CARE PLAN
Problem: Respiratory:  Goal: Respiratory status will improve  Outcome: PROGRESSING AS EXPECTED    Intervention: Administer and titrate oxygen therapy  Pt titrated down to 3L oxygen via NC and maintaining oxygen saturation > 90%.      Problem: Psychosocial Needs:  Goal: Level of anxiety will decrease  Outcome: PROGRESSING AS EXPECTED

## 2017-09-10 NOTE — PROGRESS NOTES
[facility] Hospitalist Progress Note    Date of Service: 9/10/2017    Chief Complaint  77 y.o. female admitted 2017 with acute on chronic COPD exacerbation    Interval Problem Update  Patient reports having shortness of breath, although mildly improved since admission she is having trouble catching her breath. Patient also anxious, but denies shaving chest pain  RT protocol and duo nebs.  Palliative consulted patient, also had lengthy discussion with family, there are undecided what they want to do.   Continue IV steroids.    9/10  Patient reports feeling much better, says her breathing is much better compared to time of admission, but still not at baseline. Still having wheezing and productive cough.   D/c IV solumedrol, start PO Prednisone.  Continue RT protocol, duo nebs, Pep therapy if warranted, and incentive spirometry.     Consultants/Specialty  Palliative care    Disposition  TBD        Review of Systems   Constitutional: Positive for malaise/fatigue. Negative for chills and fever.   HENT: Negative for congestion, hearing loss, sore throat and tinnitus.    Eyes: Negative for blurred vision, double vision, photophobia and pain.   Respiratory: Positive for cough and shortness of breath. Negative for stridor.    Cardiovascular: Negative for chest pain, palpitations, orthopnea, claudication and PND.   Gastrointestinal: Negative for blood in stool, constipation, heartburn, melena, nausea and vomiting.   Genitourinary: Negative for dysuria, frequency and urgency.   Musculoskeletal: Negative for back pain, myalgias and neck pain.   Neurological: Positive for weakness. Negative for dizziness, tingling, tremors, sensory change, speech change and headaches.   Psychiatric/Behavioral: Negative for depression, memory loss and suicidal ideas. The patient is nervous/anxious.       Physical Exam  Laboratory/Imaging   Hemodynamics  Temp (24hrs), Av °C (98.6 °F), Min:36.7 °C (98.1 °F), Max:37.3 °C (99.2 °F)   Temperature:  36.7 °C (98.1 °F)  Pulse  Av  Min: 76  Max: 111    Blood Pressure : 146/68      Respiratory      Respiration: 20, Pulse Oximetry: 93 %, O2 Daily Delivery Respiratory : Silicone Nasal Cannula     Given By:: Mask, #MDI/DPI Given: MDI/DPI x 2, Work Of Breathing / Effort: Mild  RUL Breath Sounds: Diminished, RML Breath Sounds: Diminished, RLL Breath Sounds: Diminished, RIAZ Breath Sounds: Diminished, LLL Breath Sounds: Diminished    Fluids    Intake/Output Summary (Last 24 hours) at 09/10/17 1456  Last data filed at 09/10/17 0400   Gross per 24 hour   Intake              750 ml   Output                0 ml   Net              750 ml       Nutrition  Orders Placed This Encounter   Procedures   • Diet Order     Standing Status:   Standing     Number of Occurrences:   1     Order Specific Question:   Diet:     Answer:   Cardiac [6]     Order Specific Question:   Diet:     Answer:   Diabetic [3]     Physical Exam   Constitutional: She is oriented to person, place, and time. She appears well-developed and well-nourished.   HENT:   Head: Normocephalic and atraumatic.   Mouth/Throat: No oropharyngeal exudate.   Eyes: Conjunctivae are normal. Pupils are equal, round, and reactive to light. Right eye exhibits no discharge. No scleral icterus.   Neck: Neck supple. No JVD present. No thyromegaly present.   Cardiovascular: Intact distal pulses.    No murmur heard.  Pulmonary/Chest: No stridor. No respiratory distress. She has wheezes (bilateral lungs througout but improved from yesterday). She has no rales.   diminished breath sounds, bilateral wheezing scattered   Abdominal: Soft. Bowel sounds are normal. She exhibits no distension. There is no tenderness. There is no rebound.   Musculoskeletal: Normal range of motion. She exhibits no edema.   Neurological: She is alert and oriented to person, place, and time. No cranial nerve deficit.   Skin: Skin is warm. No erythema.   Psychiatric: Her behavior is normal. Thought content  normal.       Recent Labs      09/08/17   1753  09/09/17   0303  09/10/17   0400   WBC  14.4*  9.5  11.0*   RBC  4.99  4.53  4.30   HEMOGLOBIN  14.7  13.5  12.7   HEMATOCRIT  42.8  39.4  37.1   MCV  85.8  87.0  86.3   MCH  29.5  29.8  29.5   MCHC  34.3  34.3  34.2   RDW  38.7  39.9  38.9   PLATELETCT  309  226  225   MPV  9.7  9.6  9.6     Recent Labs      09/08/17   1753  09/09/17   0302  09/10/17   0400   SODIUM  132*  131*  134*   POTASSIUM  3.9  4.6  4.7   CHLORIDE  101  102  106   CO2  20  22  22   GLUCOSE  152*  152*  129*   BUN  20  23*  25*   CREATININE  0.75  0.83  0.55   CALCIUM  9.8  9.4  8.7             Recent Labs      09/09/17   0302   TRIGLYCERIDE  112   HDL  59   LDL  115*          Assessment/Plan     * COPD exacerbation (CMS-HCC)- (present on admission)   Assessment & Plan    On 4L at baseline,   hx of End Stage COPD  Patient has ongoing tobacco abuse.  Recently admitted on 9/7  CXR neg.   D/c IV solumedrol, start PO Prednisone.  Prn Morphine for anxiety.          Acute on chronic respiratory failure with hypoxia due to AECOPD (CMS-HCC)- (present on admission)   Assessment & Plan    Copd exacerbation  As above, hx of end stage COPD.  Steroids, RT protocol        Peripheral vascular disease (CMS-HCC)- (present on admission)   Assessment & Plan    Continue all medications        Tobacco dependence- (present on admission)   Assessment & Plan    Tobacco cessation counseling and education provided        Paroxysmal a-fib (CMS-HCC)- (present on admission)   Assessment & Plan    Rate is controlled continue toprol xl.        Advance care planning   Assessment & Plan    Palliative care did meet with patient and family, however at this time except for wanting to be DNR, not yet ready for hospice.         End stage COPD (CMS-HCC)   Assessment & Plan    As above,  Palliative care consulted        Tobacco abuse   Assessment & Plan    Tobacco cessation counseling and education provided        Leukocytosis    Assessment & Plan    Mildly elevated at 11,000, most likely steroid effect  Check cbc in the am        Hyponatremia   Assessment & Plan    Continue IV fluids,  Check bmp in the am        Hypertensive urgency   Assessment & Plan    Controlled, continue metoprolol , amlodipine, and lisinopril.  Prn Labetalol as needed        Anxiety disorder- (present on admission)   Assessment & Plan    As above  Improved symptoms with low dose morphine especially in the light of COPD.        HTN (hypertension)- (present on admission)   Assessment & Plan    Uncontrolled  Essential          Patient plan of care discussed at multidisplinary team rounds and with patient and R.N at [facility].      Reviewed items::  Labs reviewed, Radiology images reviewed and Medications reviewed  Koch catheter::  No Koch  DVT prophylaxis pharmacological::  Heparin  DVT prophylaxis - mechanical:  SCDs  Ulcer Prophylaxis::  Yes

## 2017-09-10 NOTE — RESPIRATORY CARE
COPD EDUCATION by COPD CLINICAL EDUCATOR  9/10/2017 at 1:47 PM by Lubna Nichols     Patient reviewed by COPD education team. Patient does not qualify for COPD program.

## 2017-09-10 NOTE — PROGRESS NOTES
[facility] Hospitalist Progress Note    Date of Service: 2017    Chief Complaint  77 y.o. female admitted 2017 with acute on chronic COPD exacerbation    Interval Problem Update  Patient reports having shortness of breath, although mildly improved since admission she is having trouble catching her breath. Patient also anxious, but denies shaving chest pain  RT protocol and duo nebs.  Palliative consulted patient, also had lengthy discussion with family, there are undecided what they want to do.   Continue IV steroids.    Consultants/Specialty  Palliative care    Disposition  TBD        Review of Systems   Constitutional: Positive for malaise/fatigue. Negative for chills and fever.   HENT: Negative for congestion, hearing loss, sore throat and tinnitus.    Eyes: Negative for blurred vision, double vision, photophobia and pain.   Respiratory: Positive for cough and shortness of breath. Negative for stridor.    Cardiovascular: Negative for chest pain, palpitations, orthopnea, claudication and PND.   Gastrointestinal: Negative for blood in stool, constipation, heartburn, melena, nausea and vomiting.   Genitourinary: Negative for dysuria, frequency and urgency.   Musculoskeletal: Negative for back pain, myalgias and neck pain.   Neurological: Positive for weakness. Negative for dizziness, tingling, tremors, sensory change, speech change and headaches.   Psychiatric/Behavioral: Negative for depression, memory loss and suicidal ideas. The patient is nervous/anxious.       Physical Exam  Laboratory/Imaging   Hemodynamics  Temp (24hrs), Av °C (98.6 °F), Min:36.1 °C (97 °F), Max:37.5 °C (99.5 °F)   Temperature: 37.3 °C (99.2 °F)  Pulse  Av.9  Min: 76  Max: 111 Heart Rate (Monitored): 82  Blood Pressure : 149/50, NIBP: 141/74      Respiratory      Respiration: (!) 28, Pulse Oximetry: 95 %, O2 Daily Delivery Respiratory : Silicone Nasal Cannula     Given By:: Mouthpiece, #MDI/DPI Given: MDI/DPI x 1, Work Of  Breathing / Effort: Moderate  RUL Breath Sounds: Diminished, RML Breath Sounds: Diminished, RLL Breath Sounds: Diminished, RIAZ Breath Sounds: Diminished, LLL Breath Sounds: Diminished    Fluids    Intake/Output Summary (Last 24 hours) at 09/09/17 1727  Last data filed at 09/09/17 0000   Gross per 24 hour   Intake              150 ml   Output                0 ml   Net              150 ml       Nutrition  Orders Placed This Encounter   Procedures   • Diet Order     Standing Status:   Standing     Number of Occurrences:   1     Order Specific Question:   Diet:     Answer:   Cardiac [6]     Order Specific Question:   Diet:     Answer:   Diabetic [3]     Physical Exam   Constitutional: She is oriented to person, place, and time. She appears well-developed and well-nourished.   HENT:   Head: Normocephalic and atraumatic.   Mouth/Throat: No oropharyngeal exudate.   Eyes: Conjunctivae are normal. Pupils are equal, round, and reactive to light. Right eye exhibits no discharge. No scleral icterus.   Neck: Neck supple. No JVD present. No thyromegaly present.   Cardiovascular: Intact distal pulses.    No murmur heard.  Pulmonary/Chest: No stridor. She is in respiratory distress. She has wheezes. She has no rales.   diminished breath sounds   Abdominal: Soft. Bowel sounds are normal. She exhibits no distension. There is no tenderness. There is no rebound.   Musculoskeletal: Normal range of motion. She exhibits no edema.   Neurological: She is alert and oriented to person, place, and time. No cranial nerve deficit.   Skin: Skin is warm. No erythema.   Psychiatric: Her behavior is normal. Thought content normal.       Recent Labs      09/08/17   1753  09/09/17   0303   WBC  14.4*  9.5   RBC  4.99  4.53   HEMOGLOBIN  14.7  13.5   HEMATOCRIT  42.8  39.4   MCV  85.8  87.0   MCH  29.5  29.8   MCHC  34.3  34.3   RDW  38.7  39.9   PLATELETCT  309  226   MPV  9.7  9.6     Recent Labs      09/08/17   1753  09/09/17   0302   SODIUM  132*   131*   POTASSIUM  3.9  4.6   CHLORIDE  101  102   CO2  20  22   GLUCOSE  152*  152*   BUN  20  23*   CREATININE  0.75  0.83   CALCIUM  9.8  9.4             Recent Labs      09/09/17   0302   TRIGLYCERIDE  112   HDL  59   LDL  115*          Assessment/Plan     * COPD exacerbation (CMS-HCC)- (present on admission)   Assessment & Plan    On 4L at baseline,   hx of End Stage COPD  Patient has ongoing tobacco abuse.  Recently admitted on 9/7  CXR neg.   Continue IV solumedrol, wean as tolerated.  Prn Morphine for anxiety.          Acute on chronic respiratory failure with hypoxia due to AECOPD (CMS-HCC)- (present on admission)   Assessment & Plan    Copd exacerbation  As above, hx of end stage COPD.  Steroids, RT protocol        Peripheral vascular disease (CMS-HCC)- (present on admission)   Assessment & Plan    Continue all medications        Tobacco dependence- (present on admission)   Assessment & Plan    Tobacco cessation counseling and education provided        Paroxysmal a-fib (CMS-HCC)- (present on admission)   Assessment & Plan    Rate is controlled continue toprol xl.        Advance care planning   Assessment & Plan    Palliative meeting pending   Patient want to be DNR.         End stage COPD (CMS-HCC)   Assessment & Plan    As above,  Palliative care consulted        Tobacco abuse   Assessment & Plan    Tobacco cessation counseling and education provided        Leukocytosis   Assessment & Plan    Resolved, cont to monitor daily cbc.        Hyponatremia   Assessment & Plan    Continue IV fluids,  Check bmp in the am        Hypertensive urgency   Assessment & Plan    Controlled, continue metoprolol , amlodipine, and lisinopril.  Prn Labetalol as needed        Anxiety disorder- (present on admission)   Assessment & Plan    As above        HTN (hypertension)- (present on admission)   Assessment & Plan    Uncontrolled  Essential            Reviewed items::  Labs reviewed and Radiology images reviewed  August  catheter::  No Koch  DVT prophylaxis pharmacological::  Heparin  Ulcer Prophylaxis::  Yes  Antibiotics:  Treating active infection/contamination beyond 24 hours perioperative coverage

## 2017-09-11 PROBLEM — Z72.0 TOBACCO ABUSE: Status: RESOLVED | Noted: 2017-01-01 | Resolved: 2017-01-01

## 2017-09-11 NOTE — PROGRESS NOTES
Ivis Dwyer Fall Risk Assessment:     Last Known Fall: No falls  Mobility: Use of assistive device/requires assist of two people  Medications: Cardiovascular or central nervous system meds  Mental Status/LOC/Awareness: Awake, alert, and oriented to date, place, and person  Toileting Needs: Use of assistive device (Bedside commode, bedpan, urinal)  Volume/Electrolyte Status: Use of IV fluids/tube feeds  Communication/Sensory: Visual (Glasses)/hearing deficit  Behavior: Appropriate behavior, Depression/anxiety  Ivis Dwyer Fall Risk Total: 13  Fall Risk Level: MODERATE RISK    Universal Fall Precautions:  call light/belongings in reach, wheelchairs and assistive devices out of sight, bed in low position and locked, siderails up x 2, use non-slip footwear, adequate lighting, clutter free and spill free environment, educate on level of risk, educate to call for assistance    Fall Risk Level Interventions:   TRIAL (TELE 8, NEURO, MED NICKY 5) Low Fall Risk Interventions  Place yellow fall risk ID band on patient: verified  Provide patient/family education based on risk assessment: verified  Educate patient/family to call staff for assistance when getting out of bed: verified  Place fall precaution signage outside patient door: verifiedTRIAL (TELE 8, NEURO, MED NICKY 5) Moderate Fall Risk Interventions  Place yellow fall risk ID band on patient: verified  Provide patient/family education based on risk assessment : verified  Educate patient/family to call staff for assistance when getting out of bed: verified  Place fall precaution signage outside patient door: verified  Utilize bed/chair fall alarm: verified     Patient Specific Interventions:     Medication: review medications with patient and family, assess for medications that can be discontinued or dosage decreased and limit combination of prn medications  Mental Status/LOC/Awareness: reinforce falls education, check on patient hourly, utilize bed/chair fall alarm  and reinforce the use of call light  Toileting: monitor intake and output/use of appropriate interventions, instruct patient/family on the use of grab bars, instruct patient/family on the need to call for assistance when toileting and do not leave patient unattended in bathroom/refer to toileting scripting  Volume/Electrolyte Status: ensure patient remains hydrated, administer medications as ordered for nausea and vomiting, monitor abnormal lab values and ensure IV fluids are appropriate  Communication/Sensory: update plan of care on whiteboard, provide communication alternatives/, ensure proper positioning when transferrng/ambulating and ensure patient has glasses/contacts and hearing aids/dentures  Behavioral: encourage patient to voice feelings, engage patient in daily activities, administer medication as ordered and instruct/reinforce fall program rationale  Mobility: schedule physical activity throughout the day, dangle prior to standing, utilize bed/chair fall alarm, ensure bed is locked and in lowest position and provide appropriate assistive device

## 2017-09-11 NOTE — PROGRESS NOTES
Report received from Albina Alvarado. Assumed care of patient. Pt is alert and oriented x, on 4L O2, updated on plan of care, denies needs at this time.

## 2017-09-11 NOTE — PROGRESS NOTES
Ivis Dwyer Fall Risk Assessment:     Last Known Fall: No falls  Mobility: Use of assistive device/requires assist of two people  Medications: Cardiovascular and central nervous system meds  Mental Status/LOC/Awareness: Awake, alert, and oriented to date, place, and person  Toileting Needs: Use of assistive device (Bedside commode, bedpan, urinal)  Volume/Electrolyte Status: Use of IV fluids/tube feeds  Communication/Sensory: Visual (Glasses)/hearing deficit  Behavior: Appropriate behavior, Depression/anxiety  Ivis Dwyer Fall Risk Total: 14  Fall Risk Level: MODERATE RISK    Universal Fall Precautions:  call light/belongings in reach, bed in low position and locked, siderails up x 2, use non-slip footwear, adequate lighting, clutter free and spill free environment, educate on level of risk, educate to call for assistance    Fall Risk Level Interventions:   TRIAL (Paperwoven, NEURO, MED NICKY 5) Low Fall Risk Interventions  Place yellow fall risk ID band on patient: verified  Provide patient/family education based on risk assessment: verified  Educate patient/family to call staff for assistance when getting out of bed: verified  Place fall precaution signage outside patient door: verifiedTRIAL (Intermolecular 8, NEURO, MED NICKY 5) Moderate Fall Risk Interventions  Place yellow fall risk ID band on patient: verified  Provide patient/family education based on risk assessment : verified  Educate patient/family to call staff for assistance when getting out of bed: verified  Place fall precaution signage outside patient door: verified  Utilize bed/chair fall alarm: verified     Patient Specific Interventions:     Medication: review medications with patient and family  Mental Status/LOC/Awareness: reinforce falls education, check on patient hourly, utilize bed/chair fall alarm, reinforce the use of call light and provide activity  Toileting: provide frquent toileting, instruct patient/family on the need to call for assistance when  toileting and do not leave patient unattended in bathroom/refer to toileting scripting  Volume/Electrolyte Status: ensure IV fluids are appropriate  Communication/Sensory: ensure proper positioning when transferrng/ambulating and ensure patient has glasses/contacts and hearing aids/dentures  Behavioral: encourage patient to voice feelings and engage patient in daily activities  Mobility: utilize bed/chair fall alarm, ensure bed is locked and in lowest position, provide appropriate assistive device and collaborate with doctor for possible PT/OT consult

## 2017-09-11 NOTE — PROGRESS NOTES
Pt c/o bed 1 talking too loudly. Charge RN asked pt in bed 1 to talk softer and pt refused. Pt continues to talk loudly on phone. Ear plugs provided for pt.

## 2017-09-11 NOTE — FLOWSHEET NOTE
09/10/17 1934   Events/Summary/Plan   Non-Invasive Resp Device Site Inspection Completed Intact   Interdisciplinary Plan of Care-Goals (Indications)   Obstructive Ventilatory Defect or Pulmonary Disease without Obvious Obstruction History / Diagnosis   Interdisciplinary Plan of Care-Outcomes    Bronchodilator Outcome Patient at Stable Baseline   Education   Education Yes - Pt. / Family has been Instructed in use of Respiratory Equipment   RT Assessment of Delivered Medications   Evaluation of Medication Delivery Daily Yes-- Pt /Family has been Instructed in use of Respiratory Medications and Adverse Reactions   SVN Group   #SVN Performed Yes   Given By: Mask   MDI/DPI Group   #MDI/DPI Given MDI/DPI x 1   Respiratory WDL   Respiratory (WDL) X   Chest Exam   Work Of Breathing / Effort Mild   Respiration 18   Pulse 84   Breath Sounds   Pre/Post Intervention Pre Intervention Assessment   RUL Breath Sounds Diminished   RML Breath Sounds Diminished   RLL Breath Sounds Diminished   RIAZ Breath Sounds Diminished   LLL Breath Sounds Diminished   Oximetry   Continuous Oximetry Yes   Oxygen   Home O2 Use Prior To Admission? Yes   Home O2 LPM Flow 4 LPM   Home O2 Frequency of Use Continuous   Pulse Oximetry 98 %   O2 (LPM) 3  (turned back up to 4L per home regimen)   O2 Daily Delivery Respiratory  Silicone Nasal Cannula

## 2017-09-11 NOTE — CARE PLAN
Problem: Communication  Goal: The ability to communicate needs accurately and effectively will improve  Outcome: PROGRESSING AS EXPECTED  Pt has communicated needs.    Problem: Safety  Goal: Will remain free from injury  Outcome: PROGRESSING AS EXPECTED  Pt has remained free from injury.  Goal: Will remain free from falls  Outcome: PROGRESSING AS EXPECTED  Pt has remained free from falls.    Problem: Skin Integrity  Goal: Risk for impaired skin integrity will decrease  Outcome: PROGRESSING AS EXPECTED  Pt has remained free from decrease skin integrity.    Problem: Mobility  Goal: Risk for activity intolerance will decrease  Outcome: PROGRESSING AS EXPECTED  Pt got up out of bed to the bathroom with decreased intolerance.

## 2017-09-11 NOTE — PROGRESS NOTES
Pt awake, disoriented. Pt confused about where she is and how she got here. Pt re-oriented, and aware of surroundings and events. Cleared quickly, no slurred speech, no weakness, hand  equal, PERRL. Cont to monitor

## 2017-09-11 NOTE — DISCHARGE PLANNING
Transitional Care Navigator:    Met with the patient at the bedside regarding transitional care and discharge planning.  Pt states that she is very weak, and was not seen by home health as planned because she returned to the hospital after only 1 1/2 days.    Pt would benefit from short term skilled care before returning home.  OT/PT order per MD.  Pt is agreeable to SNF. Choice form completed; will hold fax until order is placed.  Have requested SNF order from MD.

## 2017-09-11 NOTE — PROGRESS NOTES
Pt assisted with bed pan. Pt noted to have black stool, small amount. No c/o pain or nausea. Will notify MD.

## 2017-09-11 NOTE — DISCHARGE PLANNING
"Care Transition Team Assessment    IHD met with pt at bedside. Pt currently lives at home with her son, who will be able to provide transportation home if needed. She states that she does not use any DME or home health services at this time. She does use home O2 provided by [facility]. She states that she smoked after previous D/C, and then could not breath for the rest of the day.     Information Source  Orientation : Oriented x 4  Information Given By: Patient  Informant's Name: Yoko  Who is responsible for making decisions for patient? : Patient    Readmission Evaluation  Is this a readmission?: Yes - unplanned readmission  Why do you think you were readmitted?: \"I don't think I should have been released the last time.\"  Was an appointment arranged for you prior to discharge?: Yes, did not attend appointment  Why didn't you go to your appointment?: Other (comment) (Readmitted before appointment date)  Were there new prescriptions you were supposed to fill after you were discharged?: Yes, prescriptions filled  Did you understand your discharge instructions?: Yes  Did you have enough support after your last discharge?: Yes    Elopement Risk  Legal Hold: No  Ambulatory or Self Mobile in Wheelchair: Yes  Disoriented: No  Psychiatric Symptoms: None  History of Wandering: No  Elopement this Admit: No  Vocalizing Wanting to Leave: No  Displays Behaviors, Body Language Wanting to Leave: No-Not at Risk for Elopement  Elopement Risk: Not at Risk for Elopement    Interdisciplinary Discharge Planning  Does Admitting Nurse Feel This Could be a Complex Discharge?: No  Primary Care Physician: Mireille Benito  Lives with - Patient's Self Care Capacity: Adult Children (Son)  Patient or legal guardian wants to designate a caregiver (see row info): No  Support Systems: Family Member(s)  Housing / Facility: 1 Story House  Do You Take your Prescribed Medications Regularly: Yes  Able to Return to Previous ADL's: Yes  Mobility Issues: " No  Prior Services: None  Patient Expects to be Discharged to:: Home  Assistance Needed: No  Durable Medical Equipment: Home Oxygen    Discharge Preparedness  What is your plan after discharge?: Skilled nursing facility  What are your discharge supports?: Child  Prior Functional Level: Ambulatory, Needs Assist with Activities of Daily Living, Needs Assist with Medication Management  Difficulity with ADLs: Bathing  Difficulty with ADLs Comment: assisted by son  Difficulity with IADLs: Cooking, Laundry, Managing medication, Shopping  Difficulity with IADL Comments: assisted by son    Functional Assesment  Prior Functional Level: Ambulatory, Needs Assist with Activities of Daily Living, Needs Assist with Medication Management    Finances  Financial Barriers to Discharge: No  Prescription Coverage: Yes ([facility] [address])    Vision / Hearing Impairment  Vision Impairment : Yes  Right Eye Vision: Impaired, Wears Glasses  Left Eye Vision: Impaired, Wears Glasses  Hearing Impairment : No    Values / Beliefs / Concerns  Values / Beliefs Concerns : No  Special Hospitalization Concerns: None         Domestic Abuse  Have you ever been the victim of abuse or violence?: No  Physical Abuse or Sexual Abuse: No  Verbal Abuse or Emotional Abuse: No  Possible Abuse Reported to:: Not Applicable    Psychological Assessment  History of Substance Abuse: None  History of Psychiatric Problems: No  Non-compliant with Treatment: No    Discharge Risks or Barriers  Discharge risks or barriers?: No    Anticipated Discharge Information  Anticipated discharge disposition: SNF  Discharge Address: [address]   Discharge Contact Phone Number: [phone]

## 2017-09-12 NOTE — PROGRESS NOTES
Met with patient during bedside report. Pt denies pain, no needs at this time. Bed low and locked, call light within reach. Instructed to call for assistance.

## 2017-09-12 NOTE — CARE PLAN
Problem: Safety  Goal: Will remain free from falls  Outcome: PROGRESSING AS EXPECTED  Discussed safety precautions with pt. Bed alarm on and call light within reach. Pt verbalized understanding.    Problem: Venous Thromboembolism (VTW)/Deep Vein Thrombosis (DVT) Prevention:  Goal: Patient will participate in Venous Thrombosis (VTE)/Deep Vein Thrombosis (DVT)Prevention Measures  Outcome: PROGRESSING AS EXPECTED  Discussed lovenox with pt. Pt is participating in VTE prevention.

## 2017-09-12 NOTE — PROGRESS NOTES
[facility] Hospitalist Progress Note    Date of Service: 2017    Chief Complaint  77 y.o. female admitted 2017 with acute on chronic COPD exacerbation    Interval Problem Update  Patient reports having shortness of breath, although mildly improved since admission she is having trouble catching her breath. Patient also anxious, but denies shaving chest pain  RT protocol and duo nebs.  Palliative consulted patient, also had lengthy discussion with family, there are undecided what they want to do.   Continue IV steroids.    9/10  Patient reports feeling much better, says her breathing is much better compared to time of admission, but still not at baseline. Still having wheezing and productive cough.   D/c IV solumedrol, start PO Prednisone.  Continue RT protocol, duo nebs, Pep therapy if warranted, and incentive spirometry.     Consultants/Specialty  Palliative care    Disposition  TBD        Review of Systems   Constitutional: Positive for malaise/fatigue. Negative for chills and fever.   HENT: Negative for congestion, hearing loss, sore throat and tinnitus.    Eyes: Negative for blurred vision, double vision, photophobia and pain.   Respiratory: Positive for cough, sputum production and shortness of breath (improving). Negative for stridor.    Cardiovascular: Negative for chest pain, palpitations, orthopnea, claudication and PND.   Gastrointestinal: Negative for blood in stool, constipation, heartburn, melena, nausea and vomiting.   Genitourinary: Negative for dysuria, frequency and urgency.   Musculoskeletal: Negative for back pain, myalgias and neck pain.   Neurological: Positive for weakness. Negative for dizziness, tingling, tremors, sensory change, speech change and headaches.   Psychiatric/Behavioral: Negative for depression, memory loss and suicidal ideas. The patient is not nervous/anxious.       Physical Exam  Laboratory/Imaging   Hemodynamics  Temp (24hrs), Av.6 °C (97.9 °F), Min:36.2 °C (97.1 °F),  Max:37.3 °C (99.2 °F)   Temperature: 37.3 °C (99.2 °F)  Pulse  Av.4  Min: 69  Max: 111    Blood Pressure : 131/67      Respiratory      Respiration: 16, Pulse Oximetry: 99 %, O2 Daily Delivery Respiratory : Silicone Nasal Cannula     Given By:: Mask, #MDI/DPI Given: MDI/DPI x 2, Work Of Breathing / Effort: Mild  RUL Breath Sounds: Diminished, RML Breath Sounds: Diminished, RLL Breath Sounds: Diminished, RIAZ Breath Sounds: Diminished, LLL Breath Sounds: Diminished    Fluids    Intake/Output Summary (Last 24 hours) at 17 1752  Last data filed at 17 1300   Gross per 24 hour   Intake             1685 ml   Output              800 ml   Net              885 ml       Nutrition  Orders Placed This Encounter   Procedures   • Diet Order     Standing Status:   Standing     Number of Occurrences:   1     Order Specific Question:   Diet:     Answer:   Cardiac [6]     Order Specific Question:   Diet:     Answer:   Diabetic [3]     Physical Exam   Constitutional: She is oriented to person, place, and time. She appears well-developed and well-nourished.   HENT:   Head: Normocephalic and atraumatic.   Mouth/Throat: No oropharyngeal exudate.   Eyes: Conjunctivae are normal. Pupils are equal, round, and reactive to light. Right eye exhibits no discharge. No scleral icterus.   Neck: Neck supple. No JVD present. No thyromegaly present.   Cardiovascular: Normal heart sounds and intact distal pulses.    No murmur heard.  Pulmonary/Chest: No stridor. No respiratory distress. Wheezes: scatered bilateral lungs, significant improvement from yesterday. She has no rales.   diminished breath sounds   Abdominal: Soft. Bowel sounds are normal. She exhibits no distension. There is no tenderness. There is no rebound.   Musculoskeletal: Normal range of motion. She exhibits no edema.   Neurological: She is alert and oriented to person, place, and time. No cranial nerve deficit.   Skin: Skin is warm. No erythema.   Psychiatric: Her  behavior is normal. Thought content normal.       Recent Labs      09/09/17   0303  09/10/17   0400  09/11/17   0339   WBC  9.5  11.0*  11.8*   RBC  4.53  4.30  3.96*   HEMOGLOBIN  13.5  12.7  12.1   HEMATOCRIT  39.4  37.1  34.4*   MCV  87.0  86.3  86.9   MCH  29.8  29.5  30.6   MCHC  34.3  34.2  35.2*   RDW  39.9  38.9  39.2   PLATELETCT  226  225  229   MPV  9.6  9.6  9.6     Recent Labs      09/09/17   0302  09/10/17   0400  09/11/17   0339   SODIUM  131*  134*  133*   POTASSIUM  4.6  4.7  4.4   CHLORIDE  102  106  109   CO2  22  22  20   GLUCOSE  152*  129*  130*   BUN  23*  25*  23*   CREATININE  0.83  0.55  0.56   CALCIUM  9.4  8.7  8.8             Recent Labs      09/09/17   0302   TRIGLYCERIDE  112   HDL  59   LDL  115*          Assessment/Plan     * Acute on chronic respiratory failure with hypoxia due to AECOPD (CMS-HCC)- (present on admission)   Assessment & Plan    As above  2/2 to COPD exacerbation  As above, hx of end stage COPD.  Continue PO cefdinir and doxycyline  Steroids, RT protocol        COPD exacerbation (CMS-HCC)- (present on admission)   Assessment & Plan    On 4L at baseline,   hx of End Stage COPD  Patient has ongoing tobacco abuse.  Recently admitted on 9/7  CXR neg.   Continue PO Prednisone.  Prn Morphine for anxiety.          Peripheral vascular disease (CMS-HCC)- (present on admission)   Assessment & Plan    Continue all medications        Tobacco dependence- (present on admission)   Assessment & Plan    Tobacco cessation counseling and education provided        Paroxysmal a-fib (CMS-HCC)- (present on admission)   Assessment & Plan    Rate is controlled continue toprol xl.        Advance care planning   Assessment & Plan    Palliative care did meet with patient and family, however at this time except for wanting to be DNR, not yet ready for hospice.         End stage COPD (CMS-HCC)   Assessment & Plan    As above,  Palliative care consulted, patient wishes to be DNR, but does not want  hospice support.         Leukocytosis   Assessment & Plan    Mildly elevated, most likely from steroid mediated cellular demargination.  Check cbc in the am        Hyponatremia   Assessment & Plan    Continue IV fluids,  Check bmp in the am        Hypertensive urgency   Assessment & Plan    Controlled, continue metoprolol , amlodipine, and lisinopril.  Prn Labetalol as needed        Anxiety disorder- (present on admission)   Assessment & Plan    As above  Improved symptoms with low dose morphine especially in the light of COPD.  Overall improved         HTN (hypertension)- (present on admission)   Assessment & Plan    Controlled  Continue lisinopril and metoprolol           Patient plan of care discussed at multidisplinary team rounds and with patient and R.N at [facility].      Reviewed items::  Labs reviewed, Radiology images reviewed and Medications reviewed  Koch catheter::  No Koch  DVT prophylaxis pharmacological::  Heparin  DVT prophylaxis - mechanical:  SCDs  Ulcer Prophylaxis::  Yes

## 2017-09-12 NOTE — PROGRESS NOTES
Ivis Dwyer Fall Risk Assessment:     Last Known Fall: No falls  Mobility: Immobilized/requires assist of one person  Medications: Cardiovascular or central nervous system meds  Mental Status/LOC/Awareness: Awake, alert, and oriented to date, place, and person  Toileting Needs: No needs  Volume/Electrolyte Status: Use of IV fluids/tube feeds  Communication/Sensory: Visual (Glasses)/hearing deficit  Behavior: Depression/anxiety  Ivis Dwyer Fall Risk Total: 10  Fall Risk Level: LOW RISK    Universal Fall Precautions:  call light/belongings in reach, siderails up x 2, wheelchairs and assistive devices out of sight, bed in low position and locked, use non-slip footwear, adequate lighting, clutter free and spill free environment, educate on level of risk, educate to call for assistance    Fall Risk Level Interventions:   TRIAL ([employer], NEURO, MED NICKY 5) Low Fall Risk Interventions  Place yellow fall risk ID band on patient: verified  Provide patient/family education based on risk assessment: verified  Educate patient/family to call staff for assistance when getting out of bed: verified  Place fall precaution signage outside patient door: verifiedTRIAL ([employer], NEURO, MED NICKY 5) Moderate Fall Risk Interventions  Place yellow fall risk ID band on patient: verified  Provide patient/family education based on risk assessment : verified  Educate patient/family to call staff for assistance when getting out of bed: verified  Place fall precaution signage outside patient door: verified  Utilize bed/chair fall alarm: verified     Patient Specific Interventions:     Medication: review medications with patient and family, assess for medications that can be discontinued or dosage decreased, limit combination of prn medications and provide patients who received diuretics/laxatives frequent toileting  Mental Status/LOC/Awareness: reorient patient, reinforce falls education, encourage family to stay with patient, check on  patient hourly, utilize bed/chair fall alarm and reinforce the use of call light  Toileting: provide frquent toileting, monitor intake and output/use of appropriate interventions, instruct male patients prone to dizziness to void while sitting, instruct patient/family on the use of grab bars, instruct patient/family on the need to call for assistance when toileting and do not leave patient unattended in bathroom/refer to toileting scripting  Volume/Electrolyte Status: monitor blood sugars and maintain appropriate blood sugar levels if diabetic, advance diet as tolerated, administer medications as ordered for nausea and vomiting, monitor abnormal lab values and ensure IV fluids are appropriate  Communication/Sensory: update plan of care on whiteboard, ensure proper positioning when transferrng/ambulating, ensure patient has glasses/contacts and hearing aids/dentures and for visually impaired patients orient to their room surrounding and do not change their surroundings  Behavioral: encourage patient to voice feelings, administer medication as ordered and instruct/reinforce fall program rationale  Mobility: provide comfort measures during transport, utilize bed/chair fall alarm, ensure bed is locked and in lowest position, provide appropriate assistive device and instruct patient to exit bed on their strongest side

## 2017-09-12 NOTE — THERAPY
"Occupational Therapy Evaluation completed.   Functional Status: Supv supine > EOB (HOB elevated), SBA stand-pivot transfers, CGA toileting, supv standing grooming   Plan of Care: Will benefit from Occupational Therapy 3 times per week  Discharge Recommendations:  Equipment: Raised Toilet Seat and Will Continue to Assess for Equipment Needs. Post-acute therapy Discharge to home with outpatient or home health for additional skilled therapy services (if pt demonstrates ability to safely manage O2 line and FWW).     See \"Rehab Therapy-Acute\" Patient Summary Report for complete documentation.    77 y.o. female with recent admission (9/3-9/7) for COPD. Pt was home 1 day, then readmitted for worsening symptoms. Seen for OT eval. Used FWW for mobility, but required assist to safely manage lines. Pt tolerated standing for 4 min with no c/o fatigue. Pt may be able to DC home with HH, but there is concern for her ability to safely manage oxygen while using FWW. Walker was deliverd to pt's room this admission. Educated on recommendation for RTS with arms to be used at home. Acute OT to follow to maximize functional independence and safety.     "

## 2017-09-12 NOTE — THERAPY
"Physical Therapy Evaluation completed.   Bed Mobility:  Supine to Sit: Supervised (HOB elevated)  Transfers: Sit to Stand: Stand by Assist  Gait: Level Of Assist: Stand by Assist with Front-Wheel Walker       Plan of Care: Will benefit from Physical Therapy 3 times per week  Discharge Recommendations: Equipment: Front-Wheel Walker. Post-acute therapy Discharge to home with outpatient or home health for additional skilled therapy services.    See \"Rehab Therapy-Acute\" Patient Summary Report for complete documentation.     "

## 2017-09-12 NOTE — DIETARY
Nutrition Services: Consult received for Poor PO intake    Pt is being followed by dietary for poor PO intake with full assessment note left on 9/10. Please refer to initial assessment note. Pt consumed 25-75% of meals yesterday. RD will continue to follow.

## 2017-09-12 NOTE — PROGRESS NOTES
PT ambulated to bathroom with assistance of RN pt had small bm, soft, black. Pt on O2 the whole time, reports sob, sat 93% when returned to bed and cpl applied.

## 2017-09-12 NOTE — PROGRESS NOTES
"Pt awakens to voice, pt reports feeling confused but can state name,  day and place. Pt states \"I'm confused\". Denies pain, no weakness noted, PERRL, speech clear. Cont to monitor    "

## 2017-09-12 NOTE — PROGRESS NOTES
[facility] Hospitalist Progress Note    Date of Service: 2017    Chief Complaint  77 y.o. female admitted 2017 with acute on chronic COPD exacerbation    Interval Problem Update    Improved anxiety and dyspnea.  Still unsteady - using walker.  She is interested in Snf for rehab.     Consultants/Specialty  Palliative care    Disposition  TBD - possible snf for rehab.         Review of Systems   Constitutional: Positive for malaise/fatigue. Negative for chills and fever.   HENT: Negative for congestion and sore throat.    Eyes: Negative for discharge and redness.   Respiratory: Positive for cough and shortness of breath (improving slowly ). Negative for sputum production and stridor.    Cardiovascular: Negative for chest pain, leg swelling and PND.   Gastrointestinal: Negative for constipation, nausea and vomiting.   Genitourinary: Negative for dysuria, frequency and urgency.   Musculoskeletal: Negative for back pain, myalgias and neck pain.   Neurological: Positive for weakness. Negative for tremors, sensory change and speech change.   Psychiatric/Behavioral: Negative for depression. The patient is nervous/anxious (chronic ).       Physical Exam  Laboratory/Imaging   Hemodynamics  Temp (24hrs), Av.7 °C (98.1 °F), Min:36.3 °C (97.4 °F), Max:37.3 °C (99.2 °F)   Temperature: 36.6 °C (97.9 °F)  Pulse  Av  Min: 65  Max: 111    Blood Pressure : 132/72      Respiratory      Respiration: 18, Pulse Oximetry: 92 %, O2 Daily Delivery Respiratory : Silicone Nasal Cannula     Given By:: Mask, #MDI/DPI Given: MDI/DPI x 2, Work Of Breathing / Effort: Mild  RUL Breath Sounds: Clear;Diminished, RML Breath Sounds: Diminished, RLL Breath Sounds: Diminished, RIAZ Breath Sounds: Clear;Diminished, LLL Breath Sounds: Diminished    Fluids    Intake/Output Summary (Last 24 hours) at 17 1517  Last data filed at 17 0500   Gross per 24 hour   Intake             1305 ml   Output              375 ml   Net              930  ml       Nutrition  Orders Placed This Encounter   Procedures   • Diet Order     Standing Status:   Standing     Number of Occurrences:   1     Order Specific Question:   Diet:     Answer:   Cardiac [6]     Order Specific Question:   Diet:     Answer:   Diabetic [3]     Physical Exam   Constitutional: She is oriented to person, place, and time. She appears well-developed and well-nourished. No distress.   HENT:   Head: Normocephalic and atraumatic.   Eyes: Conjunctivae are normal. Right eye exhibits no discharge. No scleral icterus.   Neck: Neck supple. No JVD present. No thyromegaly present.   Cardiovascular: Normal heart sounds and intact distal pulses.    No murmur heard.  Pulmonary/Chest: No stridor. She has no wheezes (diff dim bs ). She has no rales.   Tachypnea w exertion    Abdominal: Soft. Bowel sounds are normal. She exhibits no distension. There is no tenderness.   Musculoskeletal: Normal range of motion. She exhibits no edema.   Neurological: She is alert and oriented to person, place, and time. No cranial nerve deficit.   Skin: Skin is warm. No erythema.   Psychiatric:   Little anxious, cooperative.        Recent Labs      09/10/17   0400  09/11/17 0339 09/12/17   0140   WBC  11.0*  11.8*  10.5   RBC  4.30  3.96*  3.89*   HEMOGLOBIN  12.7  12.1  11.7*   HEMATOCRIT  37.1  34.4*  33.4*   MCV  86.3  86.9  85.9   MCH  29.5  30.6  30.1   MCHC  34.2  35.2*  35.0   RDW  38.9  39.2  39.3   PLATELETCT  225  229  204   MPV  9.6  9.6  9.2     Recent Labs      09/10/17   0400  09/11/17   0339  09/12/17   0140   SODIUM  134*  133*  136   POTASSIUM  4.7  4.4  4.1   CHLORIDE  106  109  109   CO2  22  20  21   GLUCOSE  129*  130*  123*   BUN  25*  23*  24*   CREATININE  0.55  0.56  0.55   CALCIUM  8.7  8.8  8.6                      Assessment/Plan     * Acute on chronic respiratory failure with hypoxia due to AECOPD (CMS-[facility])- (present on admission)   Assessment & Plan    End stage COPD w  exacerbation   PO cefdinir  and doxycyline  RT protocol  Will plan prolonged taper steroids   Still debilitated PT eval.        COPD exacerbation (CMS-HCC)- (present on admission)   Assessment & Plan    Now at 4L at baseline,  w ongoing tobacco abuse.  Recently admitted on 9/7  CXR neg.    PO Prednisone.  Prn Morphine for anxiety.          Peripheral vascular disease (CMS-HCC)- (present on admission)   Assessment & Plan    Statin         Tobacco dependence- (present on admission)   Assessment & Plan    Tobacco cessation counseling and education provided  Ongoing re enforcement.         Paroxysmal a-fib (CMS-HCC)- (present on admission)   Assessment & Plan    Rate controlled  cw toprol xl.        End stage COPD (CMS-HCC)   Assessment & Plan    As above,   DNR, palliative assisting - not ready for hospice.         Leukocytosis   Assessment & Plan    Mildly elevated - now resolved           Hyponatremia   Assessment & Plan    Continue IV fluids,  Check bmp in the am        Hypertensive urgency   Assessment & Plan    metoprolol , amlodipine, and lisinopril.  Prn Labetalol as needed        Advance care planning   Assessment & Plan    Palliative care follwing, recently changed to  DNR, not yet ready for hospice.         Anxiety disorder- (present on admission)   Assessment & Plan    As above- improved.  Good response w  low dose morphine - will continue.           HTN (hypertension)- (present on admission)   Assessment & Plan    Controlled    cw  lisinopril and metoprolol           Dispo: recently dcd w readmission - concern for FTT-- may need snf .       Reviewed items::  Labs reviewed, Radiology images reviewed and Medications reviewed  Koch catheter::  No Koch  DVT prophylaxis pharmacological::  Heparin  DVT prophylaxis - mechanical:  SCDs  Ulcer Prophylaxis::  Yes

## 2017-09-13 NOTE — CARE PLAN
Problem: Safety  Goal: Will remain free from falls  Outcome: PROGRESSING AS EXPECTED  Discussed safety precautions with pt. Bed alarm is on and call light is within reach.    Problem: Venous Thromboembolism (VTW)/Deep Vein Thrombosis (DVT) Prevention:  Goal: Patient will participate in Venous Thrombosis (VTE)/Deep Vein Thrombosis (DVT)Prevention Measures  Outcome: PROGRESSING AS EXPECTED  Discussed lovenox with pt. Pt is participating in VTE prevention

## 2017-09-13 NOTE — CARE PLAN
Problem: Bronchoconstriction:  Goal: Improve in air movement and diminished wheezing  Outcome: PROGRESSING SLOWER THAN EXPECTED  Respiratory Therapy Update    Interdisciplinary Plan of Care-Goals (Indications)  Obstructive Ventilatory Defect or Pulmonary Disease without Obvious Obstruction: History / Diagnosis (09/12/17 2113)  Interdisciplinary Plan of Care-Outcomes   Bronchodilator Outcome: Improved Patient Appearance with Decreased use of Accessory Muscles (09/12/17 2113)       Cough: Congested;Non Productive (09/12/17 2113)  Sputum Amount: Unable to Evaluate (09/12/17 2113)  Sputum Color: Unable to Evaluate (09/12/17 2113)  Sputum Consistency: Unable to Evaluate (09/12/17 2113)       O2 (FiO2): 32 (09/12/17 2113)  O2 (LPM): 2 (09/12/17 2359)  O2 Daily Delivery Respiratory : Silicone Nasal Cannula (09/12/17 2113)    Breath Sounds  Pre/Post Intervention: Pre Intervention Assessment (09/12/17 1526)  RUL Breath Sounds: Clear (09/12/17 2113)  RML Breath Sounds: Clear (09/12/17 2113)  RLL Breath Sounds: Diminished (09/12/17 2113)  RIAZ Breath Sounds: Clear (09/12/17 2113)  LLL Breath Sounds: Diminished (09/12/17 2113)

## 2017-09-13 NOTE — PROGRESS NOTES
Ivis Dwyer Fall Risk Assessment:     Last Known Fall: No falls  Mobility: Immobilized/requires assist of one person  Medications: Cardiovascular and central nervous system meds  Mental Status/LOC/Awareness: Awake, alert, and oriented to date, place, and person  Toileting Needs: No needs  Volume/Electrolyte Status: Use of IV fluids/tube feeds  Communication/Sensory: Visual (Glasses)/hearing deficit  Behavior: Depression/anxiety  Ivis Dwyer Fall Risk Total: 11  Fall Risk Level: MODERATE RISK    Universal Fall Precautions:  call light/belongings in reach, bed in low position and locked, wheelchairs and assistive devices out of sight, siderails up x 2, use non-slip footwear, adequate lighting, educate to call for assistance, educate on level of risk, clutter free and spill free environment    Fall Risk Level Interventions:   TRIAL ([employer], NEURO, MED NICKY 5) Low Fall Risk Interventions  Place yellow fall risk ID band on patient: verified  Provide patient/family education based on risk assessment: verified  Educate patient/family to call staff for assistance when getting out of bed: verified  Place fall precaution signage outside patient door: verifiedTRIAL ([employer] 8, NEURO, MED NICKY 5) Moderate Fall Risk Interventions  Place yellow fall risk ID band on patient: verified  Provide patient/family education based on risk assessment : verified  Educate patient/family to call staff for assistance when getting out of bed: verified  Place fall precaution signage outside patient door: verified  Utilize bed/chair fall alarm: verified     Patient Specific Interventions:     Medication: review medications with patient and family, assess for medications that can be discontinued or dosage decreased and limit combination of prn medications  Mental Status/LOC/Awareness: reinforce falls education, check on patient hourly, utilize bed/chair fall alarm and reinforce the use of call light  Toileting: provide frquent toileting,  monitor intake and output/use of appropriate interventions, instruct patient/family on the use of grab bars, instruct patient/family on the need to call for assistance when toileting and do not leave patient unattended in bathroom/refer to toileting scripting  Volume/Electrolyte Status: ensure patient remains hydrated, monitor blood sugars and maintain appropriate blood sugar levels if diabetic, administer medications as ordered for nausea and vomiting, monitor abnormal lab values and ensure IV fluids are appropriate  Communication/Sensory: update plan of care on whiteboard, ensure proper positioning when transferrng/ambulating, ensure patient has glasses/contacts and hearing aids/dentures and for visually impaired patients orient to their room surrounding and do not change their surroundings  Behavioral: encourage patient to voice feelings, administer medication as ordered and instruct/reinforce fall program rationale  Mobility: provide comfort measures during transport, dangle prior to standing, utilize bed/chair fall alarm, ensure bed is locked and in lowest position and provide appropriate assistive device

## 2017-09-14 NOTE — DISCHARGE PLANNING
Received choice form from [facility] for SNF services, referral has been sent to [facility] (1st choice) per patient request.

## 2017-09-14 NOTE — PROGRESS NOTES
Ivis Dwyer Fall Risk Assessment:     Last Known Fall: No falls  Mobility: Immobilized/requires assist of one person  Medications: Cardiovascular and central nervous system meds  Mental Status/LOC/Awareness: Awake, alert, and oriented to date, place, and person  Toileting Needs: No needs  Volume/Electrolyte Status: Use of IV fluids/tube feeds  Communication/Sensory: Visual (Glasses)/hearing deficit  Behavior: Depression/anxiety  Ivis Dwyer Fall Risk Total: 11  Fall Risk Level: MODERATE RISK    Universal Fall Precautions:  call light/belongings in reach, bed in low position and locked, wheelchairs and assistive devices out of sight, siderails up x 2, use non-slip footwear, adequate lighting, clutter free and spill free environment, educate on level of risk, educate to call for assistance    Fall Risk Level Interventions:   TRIAL ([employer], NEURO, MED NICKY 5) Low Fall Risk Interventions  Place yellow fall risk ID band on patient: verified  Provide patient/family education based on risk assessment: verified  Educate patient/family to call staff for assistance when getting out of bed: verified  Place fall precaution signage outside patient door: verifiedTRIAL ([employer], NEURO, MED NICKY 5) Moderate Fall Risk Interventions  Place yellow fall risk ID band on patient: verified  Provide patient/family education based on risk assessment : verified  Educate patient/family to call staff for assistance when getting out of bed: verified  Place fall precaution signage outside patient door: verified  Utilize bed/chair fall alarm: verified     Patient Specific Interventions:     Medication: limit combination of prn medications  Mental Status/LOC/Awareness: not applicable  Toileting: provide frquent toileting  Volume/Electrolyte Status: ensure IV fluids are appropriate  Communication/Sensory: update plan of care on whiteboard  Behavioral: not applicable  Mobility: utilize bed/chair fall alarm

## 2017-09-14 NOTE — CARE PLAN
Problem: Venous Thromboembolism (VTW)/Deep Vein Thrombosis (DVT) Prevention:  Goal: Patient will participate in Venous Thrombosis (VTE)/Deep Vein Thrombosis (DVT)Prevention Measures  Outcome: PROGRESSING AS EXPECTED  Pt receiving lovenox for VTE.

## 2017-09-14 NOTE — PROGRESS NOTES
[facility] Hospitalist Progress Note    Date of Service: 2017    Chief Complaint  77 y.o. female admitted 2017 with acute on chronic COPD exacerbation    Interval Problem Update    Noted increased anxiety this afternoon.  Overall breathing better. Debilitated , interested in snf for rehab.      Consultants/Specialty  Palliative care    Disposition  TBD - possible snf for rehab.         Review of Systems   Constitutional: Positive for malaise/fatigue. Negative for chills and fever.   HENT: Negative for congestion.    Respiratory: Positive for cough and shortness of breath (improving slowly ). Negative for sputum production.    Cardiovascular: Negative for chest pain and PND.   Gastrointestinal: Negative for nausea and vomiting.   Genitourinary: Negative for dysuria, frequency and urgency.   Musculoskeletal: Negative for back pain, myalgias and neck pain.   Neurological: Positive for weakness. Negative for tremors, sensory change, speech change and headaches.   Psychiatric/Behavioral: Negative for depression and hallucinations. The patient is nervous/anxious (chronic ).       Physical Exam  Laboratory/Imaging   Hemodynamics  Temp (24hrs), Av.1 °C (98.7 °F), Min:36.7 °C (98.1 °F), Max:37.4 °C (99.3 °F)   Temperature: 36.9 °C (98.5 °F)  Pulse  Av.6  Min: 62  Max: 111    Blood Pressure : 134/74      Respiratory      Respiration: 18, Pulse Oximetry: 97 %, O2 Daily Delivery Respiratory : Silicone Nasal Cannula     Given By:: Mask, Work Of Breathing / Effort: Mild  RUL Breath Sounds: Diminished, RML Breath Sounds: Diminished, RLL Breath Sounds: Diminished, RIAZ Breath Sounds: Diminished, LLL Breath Sounds: Diminished    Fluids  No intake or output data in the 24 hours ending 17    Nutrition  Orders Placed This Encounter   Procedures   • Diet Order     Standing Status:   Standing     Number of Occurrences:   1     Order Specific Question:   Diet:     Answer:   Cardiac [6]     Order Specific Question:    Diet:     Answer:   Diabetic [3]     Physical Exam   Constitutional: She is oriented to person, place, and time. She appears well-developed and well-nourished.   Purse lip breathing w exertion .   HENT:   Head: Normocephalic and atraumatic.   Eyes: Conjunctivae and EOM are normal. No scleral icterus.   Neck: Neck supple. No JVD present. No thyromegaly present.   Cardiovascular: Normal heart sounds and intact distal pulses.    No murmur heard.  Pulmonary/Chest: No stridor. She has no wheezes. She has no rales.   Tachypnea w exertion   Diff dim bs.   Abdominal: Soft. Bowel sounds are normal. She exhibits no distension. There is no tenderness.   Musculoskeletal: Normal range of motion. She exhibits no edema.   Neurological: She is alert and oriented to person, place, and time. No cranial nerve deficit.   Skin: Skin is warm. No erythema.   Psychiatric:   Little anxious, cooperative.        Recent Labs      09/11/17   0339  09/12/17   0140   WBC  11.8*  10.5   RBC  3.96*  3.89*   HEMOGLOBIN  12.1  11.7*   HEMATOCRIT  34.4*  33.4*   MCV  86.9  85.9   MCH  30.6  30.1   MCHC  35.2*  35.0   RDW  39.2  39.3   PLATELETCT  229  204   MPV  9.6  9.2     Recent Labs      09/11/17   0339  09/12/17   0140   SODIUM  133*  136   POTASSIUM  4.4  4.1   CHLORIDE  109  109   CO2  20  21   GLUCOSE  130*  123*   BUN  23*  24*   CREATININE  0.56  0.55   CALCIUM  8.8  8.6                      Assessment/Plan     * COPD exacerbation (CMS-HCC)- (present on admission)   Assessment & Plan    Now at 4L at baseline,  w ongoing tobacco abuse.  Recently admitted on 9/7  CXR neg.    taper steroids   Prn Morphine for anxiety.          Acute on chronic respiratory failure with hypoxia due to AECOPD (CMS-HCC)- (present on admission)   Assessment & Plan    End stage COPD w  Exacerbation- improved.    PO cefdinir and doxycyline- complete 7 day course.   RT protocol  prolonged taper steroids    PT eval.        Peripheral vascular disease (CMS-HCC)-  (present on admission)   Assessment & Plan    Statin         Tobacco dependence- (present on admission)   Assessment & Plan    Tobacco cessation counseling and education provided  Ongoing re enforcement.         Paroxysmal a-fib (CMS-HCC)- (present on admission)   Assessment & Plan    Rate controlled  cw toprol xl.        End stage COPD (CMS-[facility])   Assessment & Plan    As above,   DNR, palliative assisting - not ready for hospice.         Leukocytosis   Assessment & Plan    Mildly elevated - now resolved           Hyponatremia   Assessment & Plan    Resolved           Hypertensive urgency   Assessment & Plan    metoprolol , amlodipine, and lisinopril.  Prn Labetalol as needed        Advance care planning   Assessment & Plan    Palliative care follwing, recently changed to  DNR, not yet ready for hospice.   snf for rehab.         Anxiety disorder- (present on admission)   Assessment & Plan    improved.  Prn   low dose morphine - will continue.           HTN (hypertension)- (present on admission)   Assessment & Plan    Controlled    cw  lisinopril and metoprolol           Dispo: recently dcd w readmission - concern for FTT-- may need snf .       Reviewed items::  Labs reviewed, Radiology images reviewed and Medications reviewed  Koch catheter::  No Koch  DVT prophylaxis pharmacological::  Heparin  DVT prophylaxis - mechanical:  SCDs  Ulcer Prophylaxis::  Yes

## 2017-09-14 NOTE — PROGRESS NOTES
[facility] Hospitalist Progress Note    Date of Service: 2017    Chief Complaint  77 y.o. female admitted 2017 with acute on chronic COPD exacerbation    Interval Problem Update    Less anxious today. Up with walker in hallway. Debility-dw SW- plan tx to Snf for rehab when bed avail.     Consultants/Specialty  Palliative care    Disposition  TBD - possible snf for rehab.         Review of Systems   Constitutional: Negative for chills and fever.   HENT: Negative for congestion.    Respiratory: Positive for cough and shortness of breath (improving slowly ). Negative for sputum production.    Cardiovascular: Negative for chest pain, leg swelling and PND.   Gastrointestinal: Negative for nausea and vomiting.   Genitourinary: Negative for flank pain and hematuria.   Musculoskeletal: Negative for back pain, myalgias and neck pain.   Neurological: Positive for weakness (generlized, improved. ). Negative for tremors, sensory change and speech change.   Psychiatric/Behavioral: Negative for hallucinations. The patient is nervous/anxious.       Physical Exam  Laboratory/Imaging   Hemodynamics  Temp (24hrs), Av.8 °C (98.3 °F), Min:36 °C (96.8 °F), Max:37.6 °C (99.6 °F)   Temperature: 37 °C (98.6 °F)  Pulse  Av.3  Min: 62  Max: 111    Blood Pressure : 122/74      Respiratory      Respiration: 16, Pulse Oximetry: 95 %, O2 Daily Delivery Respiratory : Silicone Nasal Cannula     Given By:: Mask, #MDI/DPI Given: MDI/DPI x 2, Work Of Breathing / Effort: Mild  RUL Breath Sounds: Diminished, RML Breath Sounds: Diminished, RLL Breath Sounds: Diminished, RIAZ Breath Sounds: Diminished, LLL Breath Sounds: Diminished    Fluids  No intake or output data in the 24 hours ending 17 1606    Nutrition  Orders Placed This Encounter   Procedures   • Diet Order     Standing Status:   Standing     Number of Occurrences:   1     Order Specific Question:   Diet:     Answer:   Cardiac [6]     Order Specific Question:   Diet:      Answer:   Diabetic [3]     Physical Exam   Constitutional: She is oriented to person, place, and time. She appears well-developed and well-nourished.   Purse lip breathing w exertion .   HENT:   Head: Normocephalic and atraumatic.   Eyes: EOM are normal. No scleral icterus.   Neck: No JVD present.   Cardiovascular: Normal heart sounds and intact distal pulses.    No murmur heard.  Pulmonary/Chest: No stridor. She has no wheezes. She has no rales.   Tachypnea w exertion - mild   Diff dim bs.   Abdominal: Soft. Bowel sounds are normal. She exhibits no distension. There is no tenderness.   Musculoskeletal: Normal range of motion. She exhibits no edema.   Neurological: She is alert and oriented to person, place, and time. No cranial nerve deficit.   Skin: Skin is warm. No erythema.   Psychiatric:   Little anxious, cooperative.        Recent Labs      09/12/17   0140   WBC  10.5   RBC  3.89*   HEMOGLOBIN  11.7*   HEMATOCRIT  33.4*   MCV  85.9   MCH  30.1   MCHC  35.0   RDW  39.3   PLATELETCT  204   MPV  9.2     Recent Labs      09/12/17   0140   SODIUM  136   POTASSIUM  4.1   CHLORIDE  109   CO2  21   GLUCOSE  123*   BUN  24*   CREATININE  0.55   CALCIUM  8.6                      Assessment/Plan     * Acute on chronic respiratory failure with hypoxia due to AECOPD (CMS-HCC)- (present on admission)   Assessment & Plan    End stage COPD w  Exacerbation- improved.   PO cefdinir and doxycyline- complete 7 day course for pna coverage/ bronchitis .  RT protocol  Steroids.   PT eval.        COPD exacerbation (CMS-HCC)- (present on admission)   Assessment & Plan    Now at 4L at baseline  w recent  ongoing tobacco abuse.-- improved symptoms  Steroids.  Prn Morphine for anxiety.          Peripheral vascular disease (CMS-HCC)- (present on admission)   Assessment & Plan    Statin         Paroxysmal a-fib (CMS-HCC)- (present on admission)   Assessment & Plan    Rate controlled  cw toprol xl.        End stage COPD (CMS-HCC)    Assessment & Plan    As above,   DNR, palliative assisting - not ready for hospice.         Leukocytosis   Assessment & Plan    Mildly elevated - now resolved           Hyponatremia   Assessment & Plan    Resolved           Hypertensive urgency   Assessment & Plan    metoprolol , amlodipine, and lisinopril.  Prn Labetalol as needed        Advance care planning   Assessment & Plan    Palliative care [place], recently changed to  DNR, not yet ready for hospice.   Working on placement.         Tobacco dependence- (present on admission)   Assessment & Plan    Tobacco cessation counseling and education provided  Ongoing re enforcement.         Anxiety disorder- (present on admission)   Assessment & Plan    improved.  As needed low dose morphine.         HTN (hypertension)- (present on admission)   Assessment & Plan    Controlled    cw  lisinopril and metoprolol           Dispo: recently dcd w readmission - Sw to assist w placement, pt agreeable to snf for rehab.      Reviewed items::  Labs reviewed, Radiology images reviewed and Medications reviewed  Koch catheter::  No Koch  DVT prophylaxis pharmacological::  Heparin  DVT prophylaxis - mechanical:  SCDs  Ulcer Prophylaxis::  Yes  Antibiotics:  Treating active infection/contamination beyond 24 hours perioperative coverage

## 2017-09-14 NOTE — PROGRESS NOTES
Ivis Dwyer Fall Risk Assessment:     Last Known Fall: No falls  Mobility: Immobilized/requires assist of one person  Medications: Cardiovascular and central nervous system meds  Mental Status/LOC/Awareness: Awake, alert, and oriented to date, place, and person  Toileting Needs: No needs  Volume/Electrolyte Status: Use of IV fluids/tube feeds  Communication/Sensory: Visual (Glasses)/hearing deficit  Behavior: Depression/anxiety  Ivis Dwyer Fall Risk Total: 11  Fall Risk Level: MODERATE RISK    Universal Fall Precautions:  call light/belongings in reach, bed in low position and locked, wheelchairs and assistive devices out of sight, use non-slip footwear, siderails up x 2, adequate lighting, clutter free and spill free environment, educate on level of risk, educate to call for assistance    Fall Risk Level Interventions:   TRIAL ([employer], NEURO, MED NICKY 5) Low Fall Risk Interventions  Place yellow fall risk ID band on patient: verified  Provide patient/family education based on risk assessment: verified  Educate patient/family to call staff for assistance when getting out of bed: verified  Place fall precaution signage outside patient door: verifiedTRIAL ([employer], NEURO, MED NICKY 5) Moderate Fall Risk Interventions  Place yellow fall risk ID band on patient: verified  Provide patient/family education based on risk assessment : verified  Educate patient/family to call staff for assistance when getting out of bed: verified  Place fall precaution signage outside patient door: verified  Utilize bed/chair fall alarm: verified     Patient Specific Interventions:     Medication: review medications with patient and family and limit combination of prn medications  Mental Status/LOC/Awareness: reinforce falls education, encourage family to stay with patient, check on patient hourly, utilize bed/chair fall alarm and reinforce the use of call light  Toileting: consider obtaining elevated toilet seat or bedside commode  (BSC), monitor intake and output/use of appropriate interventions, instruct male patients prone to dizziness to void while sitting, instruct patient/family on the use of grab bars and instruct patient/family on the need to call for assistance when toileting  Volume/Electrolyte Status: ensure patient remains hydrated, monitor blood sugars and maintain appropriate blood sugar levels if diabetic, administer medications as ordered for nausea and vomiting, monitor abnormal lab values and ensure IV fluids are appropriate  Communication/Sensory: update plan of care on whiteboard, ensure proper positioning when transferrng/ambulating, ensure patient has glasses/contacts and hearing aids/dentures and for visually impaired patients orient to their room surrounding and do not change their surroundings  Behavioral: encourage patient to voice feelings, administer medication as ordered, instruct/reinforce fall program rationale, encourage family to stay with impulsive patients and use appropriate de-escalation techniques  Mobility: schedule physical activity throughout the day, utilize bed/chair fall alarm, ensure bed is locked and in lowest position, provide appropriate assistive device and instruct patient to exit bed on their strongest side

## 2017-09-14 NOTE — CARE PLAN
Problem: Safety  Goal: Will remain free from falls  Outcome: PROGRESSING AS EXPECTED  Discussed safety precautions with pt. Bed alarm is on and call light within reach.    Problem: Skin Integrity  Goal: Risk for impaired skin integrity will decrease  Outcome: PROGRESSING AS EXPECTED  Discussed the importance of turning every 2 hours.

## 2017-09-15 NOTE — DISCHARGE INSTRUCTIONS
Discharge Instructions    Discharged to home by car with relative. Discharged via wheelchair, hospital escort: Refused.  Special equipment needed: Oxygen and Walker    Be sure to schedule a follow-up appointment with your primary care doctor or any specialists as instructed.     Discharge Plan:   Smoking Cessation Offered: Patient Refused  Influenza Vaccine Indication: Not indicated: Previously immunized this influenza season and > 8 years of age    I understand that a diet low in cholesterol, fat, and sodium is recommended for good health. Unless I have been given specific instructions below for another diet, I accept this instruction as my diet prescription.       Special Instructions: None    · Is patient discharged on Warfarin / Coumadin?   No     · Is patient Post Blood Transfusion?  No    Chronic Obstructive Pulmonary Disease  Chronic obstructive pulmonary disease (COPD) is a common lung condition in which airflow from the lungs is limited. COPD is a general term that can be used to describe many different lung problems that limit airflow, including both chronic bronchitis and emphysema. If you have COPD, your lung function will probably never return to normal, but there are measures you can take to improve lung function and make yourself feel better.  CAUSES   · Smoking (common).  · Exposure to secondhand smoke.  · Genetic problems.  · Chronic inflammatory lung diseases or recurrent infections.  SYMPTOMS  · Shortness of breath, especially with physical activity.  · Deep, persistent (chronic) cough with a large amount of thick mucus.  · Wheezing.  · Rapid breaths (tachypnea).  · Gray or bluish discoloration (cyanosis) of the skin, especially in your fingers, toes, or lips.  · Fatigue.  · Weight loss.  · Frequent infections or episodes when breathing symptoms become much worse (exacerbations).  · Chest tightness.  DIAGNOSIS  Your health care provider will take a medical history and perform a physical  examination to diagnose COPD. Additional tests for COPD may include:  · Lung (pulmonary) function tests.  · Chest X-ray.  · CT scan.  · Blood tests.  TREATMENT   Treatment for COPD may include:  · Inhaler and nebulizer medicines. These help manage the symptoms of COPD and make your breathing more comfortable.  · Supplemental oxygen. Supplemental oxygen is only helpful if you have a low oxygen level in your blood.  · Exercise and physical activity. These are beneficial for nearly all people with COPD.  · Lung surgery or transplant.  · Nutrition therapy to gain weight, if you are underweight.  · Pulmonary rehabilitation. This may involve working with a team of health care providers and specialists, such as respiratory, occupational, and physical therapists.  HOME CARE INSTRUCTIONS  · Take all medicines (inhaled or pills) as directed by your health care provider.  · Avoid over-the-counter medicines or cough syrups that dry up your airway (such as antihistamines) and slow down the elimination of secretions unless instructed otherwise by your health care provider.  · If you are a smoker, the most important thing that you can do is stop smoking. Continuing to smoke will cause further lung damage and breathing trouble. Ask your health care provider for help with quitting smoking. He or she can direct you to community resources or hospitals that provide support.  · Avoid exposure to irritants such as smoke, chemicals, and fumes that aggravate your breathing.  · Use oxygen therapy and pulmonary rehabilitation if directed by your health care provider. If you require home oxygen therapy, ask your health care provider whether you should purchase a pulse oximeter to measure your oxygen level at home.  · Avoid contact with individuals who have a contagious illness.  · Avoid extreme temperature and humidity changes.  · Eat healthy foods. Eating smaller, more frequent meals and resting before meals may help you maintain your  strength.  · Stay active, but balance activity with periods of rest. Exercise and physical activity will help you maintain your ability to do things you want to do.  · Preventing infection and hospitalization is very important when you have COPD. Make sure to receive all the vaccines your health care provider recommends, especially the pneumococcal and influenza vaccines. Ask your health care provider whether you need a pneumonia vaccine.  · Learn and use relaxation techniques to manage stress.  · Learn and use controlled breathing techniques as directed by your health care provider. Controlled breathing techniques include:  ¨ Pursed lip breathing. Start by breathing in (inhaling) through your nose for 1 second. Then, purse your lips as if you were going to whistle and breathe out (exhale) through the pursed lips for 2 seconds.  ¨ Diaphragmatic breathing. Start by putting one hand on your abdomen just above your waist. Inhale slowly through your nose. The hand on your abdomen should move out. Then purse your lips and exhale slowly. You should be able to feel the hand on your abdomen moving in as you exhale.  · Learn and use controlled coughing to clear mucus from your lungs. Controlled coughing is a series of short, progressive coughs. The steps of controlled coughing are:  1. Lean your head slightly forward.  2. Breathe in deeply using diaphragmatic breathing.  3. Try to hold your breath for 3 seconds.  4. Keep your mouth slightly open while coughing twice.  5. Spit any mucus out into a tissue.  6. Rest and repeat the steps once or twice as needed.  SEEK MEDICAL CARE IF:  · You are coughing up more mucus than usual.  · There is a change in the color or thickness of your mucus.  · Your breathing is more labored than usual.  · Your breathing is faster than usual.  SEEK IMMEDIATE MEDICAL CARE IF:  · You have shortness of breath while you are resting.  · You have shortness of breath that prevents you from:  ¨ Being  able to talk.  ¨ Performing your usual physical activities.  · You have chest pain lasting longer than 5 minutes.  · Your skin color is more cyanotic than usual.  · You measure low oxygen saturations for longer than 5 minutes with a pulse oximeter.  MAKE SURE YOU:  · Understand these instructions.  · Will watch your condition.  · Will get help right away if you are not doing well or get worse.     This information is not intended to replace advice given to you by your health care provider. Make sure you discuss any questions you have with your health care provider.     Document Released: 09/27/2006 Document Revised: 01/08/2016 Document Reviewed: 08/14/2014  [employer] Interactive Patient Education ©2016 [employer]      Depression / Suicide Risk    As you are discharged from this [facility] facility, it is important to learn how to keep safe from harming yourself.    Recognize the warning signs:  · Abrupt changes in personality, positive or negative- including increase in energy   · Giving away possessions  · Change in eating patterns- significant weight changes-  positive or negative  · Change in sleeping patterns- unable to sleep or sleeping all the time   · Unwillingness or inability to communicate  · Depression  · Unusual sadness, discouragement and loneliness  · Talk of wanting to die  · Neglect of personal appearance   · Rebelliousness- reckless behavior  · Withdrawal from people/activities they love  · Confusion- inability to concentrate     If you or a loved one observes any of these behaviors or has concerns about self-harm, here's what you can do:  · Talk about it- your feelings and reasons for harming yourself  · Remove any means that you might use to hurt yourself (examples: pills, rope, extension cords, firearm)  · Get professional help from the community (Mental Health, Substance Abuse, psychological counseling)  · Do not be alone:Call your Safe Contact- someone whom you trust who will be there for  you.  · Call your local CRISIS HOTLINE 286-6438 or 687-149-3842  · Call your local Children's Mobile Crisis Response Team [place] (538) 625-1536 or www.[employer]  · Call the toll free National Suicide Prevention Hotlines   · National Suicide Prevention Lifeline 183-259-MLXD (9846)  · National [employer] Line Network 800-SUICIDE (219-1644)

## 2017-09-15 NOTE — DISCHARGE PLANNING
SW met with pt at bedside regarding HH choice. Per pt, she was supposed to receive HH before, but was told HH not arranged. Pt also concerned about son. Son working tomorrow and may not be able to pick pt up, if pt dc tomorrow.     Walker was delivered while SW speaking with pt. Pt gave verbal consent for referral to be sent to [facility] (#1), [place] (#2), and [place] (#3).

## 2017-09-15 NOTE — PROGRESS NOTES
Received report and met with patient at bedside. Discussed plan of care for the day. Denies any needs at this time. IV infusing, 02 in place with foam protectors.  Bed locked & in lowest position, call bell and side table within reach.

## 2017-09-15 NOTE — FACE TO FACE
Face to Face Supporting Documentation - Home Health    The encounter with this patient was in whole or in part the primary reason for home health admission.    Date of encounter:   Patient:                    MRN:                       YOB: 2017  Yoko Mistry  [id number]  1940     Home health to see patient for:  Skilled Nursing care for assessment, interventions & education, Medical social work consult, Home health aide, Physical Therapy evaluation and treatment and Occupational therapy evaluation and treatment    Skilled need for:  Exacerbation of Chronic Disease State copd    Skilled nursing interventions to include:  Comment: PT/OT, RN    Homebound status evidenced by:  Need the aid of supportive devices such as crutches, canes, wheelchairs or walkers, Require the use of special transportation or Needs the assistance of another person in order to leave the home. Leaving home requires a considerable and taxing effort. There is a normal inability to leave the home.    Community Physician to provide follow up care: Mireille Benito M.D.     Optional Interventions? No      I certify the face to face encounter for this home health care referral meets the CMS requirements and the encounter/clinical assessment with the patient was, in whole, or in part, for the medical condition(s) listed above, which is the primary reason for home health care. Based on my clinical findings: the service(s) are medically necessary, support the need for home health care, and the homebound criteria are met.  I certify that this patient has had a face to face encounter by myself.  Gerhard Cordova M.D. - NPI: [id number]

## 2017-09-15 NOTE — FACE TO FACE
Face to Face Note  -  Durable Medical Equipment    Gerhard Cordova M.D. - NPI: [id number]  I certify that this patient is under my care and that they had a durable medical equipment(DME)face to face encounter by myself that meets the physician DME face-to-face encounter requirements with this patient on:    Date of encounter:   Patient:                    MRN:                       YOB: 2017  Yoko Mistry  [id number]  1940     The encounter with the patient was in whole, or in part, for the following medical condition, which is the primary reason for durable medical equipment:  COPD    I certify that, based on my findings, the following durable medical equipment is medically necessary:  Walkers.    HOME O2 Saturation Measurements:(Values must be present for Home Oxygen orders)         ,     ,         My Clinical findings support the need for the above equipment due to:  Abnormal Gait    Supporting Symptoms: unsteady w ambulation

## 2017-09-15 NOTE — CARE PLAN
Problem: Nutritional:  Goal: Achieve adequate nutritional intake  Patient will consume 50% of meals   Outcome: PROGRESSING AS EXPECTED  Supplements in place. RD following.

## 2017-09-15 NOTE — DISCHARGE PLANNING
SHAAN Cordova to request Home Health Face to Face and Home Health order. Per PT/OT notes, HH recommended.

## 2017-09-15 NOTE — PROGRESS NOTES
Spoke with Dr. Cordova regarding discharge. Pt is extremely anxious and is becoming increasingly worked up about leaving. Was on IV morphine for anxiety, only has .25 of Clonazepam at HS at home. Pt is also supposed to be discharged with a walker. Clonazepam and walker ordered for discharge and one time dose of Clonazepam ordered.

## 2017-09-15 NOTE — DISCHARGE PLANNING
Call placed to Ann-Marie with [facility], patient can be accepted for HH services but may not be able to see patient until Monday.     SHAAN Howard has been notified via phone.

## 2017-09-15 NOTE — CARE PLAN
Ivis Dwyer Fall Risk Assessment:     Last Known Fall: No falls  Mobility: Immobilized/requires assist of one person  Medications: Cardiovascular and central nervous system meds  Mental Status/LOC/Awareness: Awake, alert, and oriented to date, place, and person  Toileting Needs: No needs  Volume/Electrolyte Status: Use of IV fluids/tube feeds  Communication/Sensory: Visual (Glasses)/hearing deficit  Behavior: Depression/anxiety  Ivis Dwyer Fall Risk Total: 11  Fall Risk Level: MODERATE RISK    Universal Fall Precautions:  call light/belongings in reach, bed in low position and locked, wheelchairs and assistive devices out of sight, siderails up x 2, use non-slip footwear, adequate lighting, clutter free and spill free environment, educate to call for assistance, educate on level of risk    Fall Risk Level Interventions:   TRIAL ([employer], NEURO, MED NICKY 5) Low Fall Risk Interventions  Place yellow fall risk ID band on patient: verified  Provide patient/family education based on risk assessment: verified  Educate patient/family to call staff for assistance when getting out of bed: verified  Place fall precaution signage outside patient door: verifiedTRIAL ([employer] 8, NEURO, MED NICKY 5) Moderate Fall Risk Interventions  Place yellow fall risk ID band on patient: verified  Provide patient/family education based on risk assessment : verified  Educate patient/family to call staff for assistance when getting out of bed: verified  Place fall precaution signage outside patient door: verified  Utilize bed/chair fall alarm: verified     Patient Specific Interventions:     Medication: limit combination of prn medications  Mental Status/LOC/Awareness: not applicable  Toileting: provide frquent toileting  Volume/Electrolyte Status: ensure IV fluids are appropriate  Communication/Sensory: update plan of care on whiteboard  Behavioral: not applicable  Mobility: utilize bed/chair fall alarm

## 2017-09-16 NOTE — DISCHARGE SUMMARY
Hospital Medicine Discharge Note     Admit Date:  9/8/2017       Discharge Date:   9/15/2017    Attending Physician:  Gerhard Cordova M.D.      Diagnoses (includes active and resolved):     Principal Problem:    Acute on chronic respiratory failure with hypoxia due to AECOPD (CMS-HCC) POA: Yes, stabilized  Active Problems:    Peripheral vascular disease (CMS-HCC) (Chronic) POA: Yes, stable    COPD exacerbation (CMS-HCC) POA: Yes, clinically improved    Paroxysmal a-fib (CMS-HCC) POA: Yes    HTN (hypertension) (Chronic) POA: Yes    Anxiety disorder (Chronic) POA: Yes    Tobacco dependence POA: Yes      Overview: Smokes 2 cigarettes in morning and 2 at night.    Advance care planning POA: Unknown    Hypertensive urgency POA: Unknown, blood pressure controlled    Hyponatremia POA: Unknown, resolved    Leukocytosis POA: Unknown, corrected    End stage COPD ([facility]) POA    Hospital Summary (Brief Narrative):       77 y.o. female who presented 9/8/2017 with  History of end-stage COPD on chronic home oxygen 4 L  of 4 L, recent discharge on September 7, 2017 for similar presentation readmitted shortness of breath.  She was placed on steroids, bronchodilator therapy.  Symptoms improve and O2 weaned down to baseline 4 L. She initially was very debilitated but this also improved.  Ambulatory with walker.  Was recommended for home health.  Did not meet criteria for skilled nursing placement.  We arrange for home health, walker.  Plan 2 week steroid taper prednisone, bronchodilator therapy.  To complete antibiotics outpatient as previously recommended.  She had anxiety component, likely contributing to her symptoms which improved.      Consultants:        None    Imaging/ Testing:      DX-CHEST-PORTABLE (1 VIEW)   Final Result      1.  Hyperinflation consistent with COPD.   2.  No pneumonia or pneumothorax.            Procedures:          None    Discharge Medications:             Medication List      START taking these  medications      Instructions   predniSONE 10 MG Tabs  Commonly known as:  DELTASONE  Notes to patient:  Tomorrow   Take 1 Tab by mouth every day. Take with food 40 mg po daily X3, 30 mg daily X 3 , 20 mg daily X 3, 10 mg daily X 3 , 5 mg daily X 3. In tapering dose fashion.  Dose:  10 mg        CHANGE how you take these medications      Instructions   clonazepam 0.5 MG Tabs  What changed:  · when to take this  · reasons to take this  Commonly known as:  KLONOPIN   Take 0.5 Tabs by mouth every 12 hours as needed (anxiety).  Dose:  0.25 mg     * simvastatin 20 MG Tabs  What changed:  Another medication with the same name was added. Make sure you understand how and when to take each.  Commonly known as:  ZOCOR  Notes to patient:  Tonight   Take 20 mg by mouth every evening.  Dose:  20 mg     * simvastatin 20 MG Tabs  What changed:  You were already taking a medication with the same name, and this prescription was added. Make sure you understand how and when to take each.  Commonly known as:  ZOCOR   TAKE 1 TABLET BY MOUTH EVERY EVENING        * This list has 2 medication(s) that are the same as other medications prescribed for you. Read the directions carefully, and ask your doctor or other care provider to review them with you.            CONTINUE taking these medications      Instructions   albuterol 108 (90 Base) MCG/ACT Aers inhalation aerosol  Commonly known as:  VENTOLIN HFA   Inhale 2 Puffs by mouth every 6 hours as needed for Shortness of Breath.  Dose:  2 Puff     amlodipine 5 MG Tabs  Commonly known as:  NORVASC  Notes to patient:  Tomorrow   Take 2 Tabs by mouth every day.  Dose:  10 mg     aspirin EC 81 MG Tbec  Commonly known as:  ECOTRIN  Notes to patient:  Tomorrow   Take 81 mg by mouth every day.  Dose:  81 mg     busPIRone 5 MG Tabs  Commonly known as:  BUSPAR  Notes to patient:  Tomorrow   Take 1 Tab by mouth every day.  Dose:  5 mg     cefdinir 300 MG Caps  Commonly known as:  OMNICEF  Notes to  patient:  Tonight   Take 1 Cap by mouth every 12 hours.  Dose:  300 mg     doxycycline monohydrate 100 MG tablet  Commonly known as:  ADOXA  Notes to patient:  Tonight   Take 1 Tab by mouth every 12 hours.  Dose:  100 mg     GARLIC PO  Notes to patient:  Tomorrow   Take 1 tablet by mouth every day.  Dose:  1 tablet     ipratropium-albuterol 0.5-2.5 (3) MG/3ML nebulizer solution  Commonly known as:  DUONEB  Notes to patient:  Tonight   INHALE 1 VIAL VIA NEBULIZER 4 TIMES A DAY     lisinopril 20 MG Tabs  Commonly known as:  PRINIVIL  Notes to patient:  Tonight   TAKE 1 TABLET BY MOUTH 2 TIMES A DAY     metoprolol SR 50 MG Tb24  Commonly known as:  TOPROL XL  Notes to patient:  Tomorrow   TAKE 1 TABLET BY MOUTH DAILY     SYMBICORT 160-4.5 MCG/ACT Aero  Generic drug:  budesonide-formoterol  Notes to patient:  Tonight   INHALE 2 PUFFS BY MOUTH 2 TIMES A DAY. USE SPACER. RINSE MOUTH AFTER EACH USE.     tiotropium 18 MCG Caps  Commonly known as:  SPIRIVA  Notes to patient:  Tomorrow   Inhale 1 Cap by mouth every day.  Dose:  18 mcg               Diet:       DIET ORDERS (Through next 24h)    Start     Ordered    09/10/17 1645  SUPPLEMENTS  ALL MEALS     Question:  Which Supplement  Answer:  Per RD    09/10/17 1645    09/08/17 2008  Diet Order  ALL MEALS     Question Answer Comment   Diet: Cardiac    Diet: Diabetic        09/08/17 2015            Activity:   As tolerated.      Code status:   DNR    Primary Care Provider:    Mireille Benito M.D.    Follow up appointment details :      .  [address] [address]  [phone]        Future Appointments  Date Time Provider Department Center   9/18/2017 3:00 PM Mireille Benito M.D. [facility] None   11/2/2017 9:40 AM Matilde Yi M.D. [facility] None           Time spent on discharge day patient visit: 40  minutes    #################################################

## 2017-09-16 NOTE — DISCHARGE PLANNING
SW met with pt at bedside for update. Pt informed [facility] accepted pt, but HH will not see pt until Monday.

## 2017-09-19 NOTE — PROGRESS NOTES
Subjective:      Yoko Mistry is a 77 y.o. female who presents with Follow-Up (Hospitals in [place])            HPI     Patient returns for follow-up after recent hospitalizations at [facility]. The first hospitalization was from 9/3-7/2017 for acute exacerbation of COPD. Chest x-ray showed questionable right midlung opacity and CT of the chest was done which did not show any mass or pneumonia. She was treated with antibiotics and steroids. She went back to the hospital on 9/8/17 for acute exacerbation of COPD, acute on chronic respiratory failure and was discharged on 9/15/17. She was put on slow taper of by mouth steroids. She is back on her 4 L/m which is her baseline oxygen use. She continues to use Spiriva, Symbicort, Ventolin inhaler and utilization treatment. She said she is much improved. She has advanced directives which states that she is a DO NOT RESUSCITATE. She was talked about hospice when she was in hospital but at this time she doesn't think she is ready to go on hospice. She is getting home health services.    In terms of her anxiety, she continues to take clonazepam 0.5 mg one tablet twice a day. Hospital discharge instruction states to take half a tablet twice a day only. She has not gone down on the dose yet. We have planned on doing this in the past and she has tried going down on the dose but at that time she feels that the dose was not enough. She was on higher dose before which was 1 mg twice a day. The reason for the lower dose is because she has compensated respiratory acidosis with elevated CO2. Her CO2 has gone down to normal on her last hospitalization.    Past medical history, past surgical history, family history reviewed-no changes    Social history reviewed-no changes    Allergies reviewed-no changes    Medications reviewed-no changes    ROS     Review of systems as per history of present illness, the rest are negative.     Objective:     /60   Pulse 93   Temp 36.7  "°C (98 °F)   Ht 1.651 m (5' 5\")   Wt 47.5 kg (104 lb 11.5 oz)   SpO2 99%   BMI 17.43 kg/m²      Physical Exam     Examined alert, awake, oriented, not in distress, frail/thin lady    Neck-supple, no lymphadenopathy, no thyromegaly  Lungs-very diminished breath sounds all lung fields, no rales, no wheezes  Heart-regular rate and rhythm, no murmur  Extremities-no edema, clubbing, cyanosis     I reviewed hospital records     Assessment/Plan:     1. Acute exacerbation of chronic obstructive pulmonary disease (COPD) (CMS-HCC)  Patient is improved. No pneumonia on x-ray. She is on slow taper of prednisone. She will continue all her inhalers, oxygen 4 L/m. Her follow-up with the pulmonologist is in November. I will see her in 3 months. She received her flu shot in the hospital. She is up-to-date with all the rest of her immunizations.    2. End stage COPD (CMS-HCC)  Same as #1. At this time she is not interested in hospice. She is getting home health services. She has advanced directives and she is DO NOT RESUSCITATE.    3. Anxiety  We will try lower dose of clonazepam 0.5 mg half a tablet twice a day. If she feels she needs to go back to the higher dose she will communicate with me. We want to avoid oversedation that would predispose her to have increase in CO2 consistent with compensated respiratory acidosis.      Please note that this dictation was created using voice recognition software. I have worked with consultants from the vendor as well as technical experts from [facility]  [employer] to optimize the interface. I have made every reasonable attempt to correct obvious errors, but I expect that there are errors of grammar and possibly content I did not discover before finalizing the note.        "

## 2017-09-19 NOTE — PROGRESS NOTES
[facility] attempted discharge outreach to patient and was able to leave a voicemail. [facility] will continue making attempts at reaching the patient.

## 2017-10-26 NOTE — PROGRESS NOTES
Patient Yoko Mistry was originally discharged on 09/07/17 for Shortness of breath, COPD Exacerbation. [facility] Patient Advocate assisted with multiple discharge orders including confirming Home Health services. Patient did not follow up with the Discharge Clinic as ordered as patient was readmitted on 09/08/17 for Acute on chronic respiratory failure with hypoxia due to AECOPD. Patient was discharge on 9/15/2017. [facility] Patient Advocate assisted with multiple discharge orders including confirming 3 appointments - 1 Primary care follow-up and 1- Ancillary services oxygen confirmed and Home Health services. The patient did not follow Home Health orders as patient was non-adherent and declined home health services. The patient has 2 future appointment with 1- Primary care physician and 1-Pulmonology appointment.

## 2017-10-28 PROBLEM — D64.9 ANEMIA: Status: ACTIVE | Noted: 2017-01-01

## 2017-10-29 NOTE — ASSESSMENT & PLAN NOTE
Currently in afib, rate controlled. Patient declined anticoagulation in the past.  - continue PTA metop, asa

## 2017-10-29 NOTE — RESPIRATORY CARE
COPD EDUCATION by COPD CLINICAL EDUCATOR  10/29/2017  at  9:15 AM by Terrie Perdomo     Patient interviewed by COPD education team.  Patient unable to participate in full program.  Short intervention has been conducted.  A comprehensive packet including information about COPD, treatments, and smoking cessation given. She is know to our Team from previous admissions. She's tired and anxious. RN at bedside and aware. D/W Hospitalist about consulting Palliative Care. Will follow as needed.  Referral to [facility] for possible enrollement

## 2017-10-29 NOTE — PROGRESS NOTES
Pt given and understands discharge instructions, (3) Rx.  PIV removed, covered and wrapped with coban.  Pt waiting for ride home with family.

## 2017-10-29 NOTE — PROGRESS NOTES
Pt tried the SCD's on, but she said she's not gonna be able to sleep with them on. Took them off. RN notified.

## 2017-10-29 NOTE — PROGRESS NOTES
Assumed care of Pt at 0700 after report from DAMEON Angelo, assessment complete.  Pt resting comfortably, A & O X 4, VSS, sinus with ocassional PVCs; Pt + for non productive cough, SOB (on 4L o2 n/c); Pt denies pain, discomfort at this time, but does feel anxious and tremulous.  Pt updated on and understands POC; medicated per MAR, US at bedside for Echo.  Hot meal tray provided, bed in low position, call light within reach - will continue to monitor.

## 2017-10-29 NOTE — ASSESSMENT & PLAN NOTE
We discussed smoking cessation. Currently smokes about 2 cigs a day, has tried nicotine in the past. We discussed medications for quitting, and dangers of smoking. Time spent was 10 minutes.

## 2017-10-29 NOTE — H&P
HOSPITAL MEDICINE HISTORY/ PHYSICAL    Date of Service:  10/28/2017   9:39 PM       Patient ID:   Name: Yoko Mistry  . YOB: 1940. Age: 77 y.o. female. MRN: [id number]    Admitting Attending:  Yolanda Viera     PCP : Mireille Benito M.D.    Chief Complaint:       SOB    History of Present Illness:    Fede is a 77 y.o. female w/h/o COPD on 4L O2, HTN, HLD, pAfib, PVD who presents with SOB.    Patient had SOB this morning, felt like she couldn't take a deep breath and panicked. She did not try any of her inhalers or nebs.  Noted yesterday to have brief abd pain, N/V/D that is now resolved. Thought it may be food related.  Has dry chronic cough that is unchanged.    Patient has multiple hospitalizations documented for SOB/COPD. Last hospitalization last month, given steroids and antibiotics with improvement. Patient seen be PCP for end stage COPD, however she does not remember this conversation.  She normally uses her neb 5-6 times a day. Normally stays at at home with son helping with shopping.    In the ED, CXR consistent with COPD, BNP elevated to 223, flu negative.    Review of Systems:    Has Review of Systems   Constitutional: Positive for malaise/fatigue. Negative for chills and fever.   Eyes: Negative for blurred vision.   Respiratory: Positive for cough, shortness of breath and wheezing. Negative for hemoptysis.    Cardiovascular: Negative for chest pain, palpitations, orthopnea and leg swelling.   Gastrointestinal: Positive for nausea. Negative for abdominal pain, diarrhea and vomiting.   Musculoskeletal: Negative for back pain.   Skin: Negative for rash.   Neurological: Negative for dizziness, focal weakness and headaches.     Please see HPI, all other systems were reviewed and are negative (AMA/CMS criteria)              Past Medical/ Family / Social history (PFSH):   Past Medical History:   Diagnosis Date   • Macular degeneration 02/17   • HTN (hypertension) 7/11/2011   • COPD (chronic  obstructive pulmonary disease) (CMS-[facility]) 7/11/2011   • Anxiety disorder 7/11/2011   • Chickenpox    • COPD    • Depression    • Croatian measles    • Hyperlipidemia    • Hypertension    • Influenza    • Mumps    • Smallpox      Past Surgical History:   Procedure Laterality Date   • FEMORAL POPLITEAL ANGIOPLASTY WITH STENT  2009   • ABDOMINAL HYSTERECTOMY TOTAL     • CARDIOVASCULAR      cardio ablation   • CHOLECYSTECTOMY     • HYSTERECTOMY LAPAROSCOPY       Current Outpatient Medications:  No current facility-administered medications on file prior to encounter.      Current Outpatient Prescriptions on File Prior to Encounter   Medication Sig Dispense Refill   • VENTOLIN  (90 Base) MCG/ACT Aero Soln inhalation aerosol INHALE 2 PUFFS BY MOUTH EVERY 6 HOURS AS NEEDED FOR SHORTNESS OF BREATH. 18 Inhaler 2   • metoprolol SR (TOPROL XL) 50 MG TABLET SR 24 HR TAKE 1 TABLET BY MOUTH DAILY 90 Tab 1   • clonazepam (KLONOPIN) 0.5 MG Tab Take 0.5 Tabs by mouth every 12 hours as needed (anxiety). 30 Tab 0   • simvastatin (ZOCOR) 20 MG Tab TAKE 1 TABLET BY MOUTH EVERY EVENING 90 Tab 1   • ipratropium-albuterol (DUONEB) 0.5-2.5 (3) MG/3ML nebulizer solution INHALE 1 VIAL VIA NEBULIZER 4 TIMES A DAY 90 mL 5   • busPIRone (BUSPAR) 5 MG Tab Take 1 Tab by mouth every day. 30 Tab 5   • SYMBICORT 160-4.5 MCG/ACT Aerosol INHALE 2 PUFFS BY MOUTH 2 TIMES A DAY. USE SPACER. RINSE MOUTH AFTER EACH USE. 1 Inhaler 5   • lisinopril (PRINIVIL) 20 MG Tab TAKE 1 TABLET BY MOUTH 2 TIMES A  Tab 1   • aspirin EC (ECOTRIN) 81 MG TBEC Take 81 mg by mouth every day.       Medication Allergy/Sensitivities:  Allergies   Allergen Reactions   • Citalopram Unspecified     Low sodium     Family History:  Family History   Problem Relation Age of Onset   • Heart Disease Mother      aortic stenosis   • Heart Disease Father    • Cancer Brother      Social History:  Social History   Substance Use Topics   • Smoking status: Current Every Day Smoker      "Packs/day: 0.25     Years: 40.00     Types: Cigarettes   • Smokeless tobacco: Never Used      Comment: Down to 5 cigs a day per patient  Started in her 30s   • Alcohol use No     #################################################################  Physical Exam:   Vitals/ General Appearance:   Weight/BMI: Body mass index is 16.45 kg/m².  Blood pressure 130/55, pulse 81, temperature 37.4 °C (99.4 °F), resp. rate (!) 24, height 1.702 m (5' 7\"), weight 47.6 kg (105 lb), SpO2 98 %.   Vitals:    10/28/17 1829 10/28/17 1900 10/28/17 2000 10/28/17 2100   BP:       Pulse: 90 80 90 81   Resp: (!) 21 (!) 24 (!) 24 (!) 24   Temp:       SpO2: 96% 93% 92% 98%   Weight:       Height:        Oxygen Therapy:  Pulse Oximetry: 98 %, O2 (LPM): 4, O2 Delivery: Nasal Cannula    Constitutional:  thin, frail, barrel chest  HENMT: Normocephalic, atraumatic, b/l ears normal, nose normal  Eyes:  EOMI, conjunctiva normal, no discharge  Neck: no tracheal deviation, supple  Cardiovascular: irregularly irregular, no murmurs, no rubs or gallops; no cyanosis, clubbing or edema  Lungs: poor inspiratory effort, wheezing or rales not appreciated, neck accessory muscle use  Abdomen: soft, non-tender, no guarding or rebound  Skin: warm, dry, no erythema, no rash  Neurologic: Alert and oriented, strength equal in all extremities, no focal deficits  Psychiatric: No anxiety or depression    #################################################################  Lab Data Review:    Objective   Recent Results (from the past 24 hour(s))   BTYPE NATRIURETIC PEPTIDE    Collection Time: 10/28/17  5:30 PM   Result Value Ref Range    B Natriuretic Peptide 223 (H) 0 - 100 pg/mL   CBC WITH DIFFERENTIAL    Collection Time: 10/28/17  5:30 PM   Result Value Ref Range    WBC 9.7 4.8 - 10.8 K/uL    RBC 3.90 (L) 4.20 - 5.40 M/uL    Hemoglobin 11.6 (L) 12.0 - 16.0 g/dL    Hematocrit 33.7 (L) 37.0 - 47.0 %    MCV 86.4 81.4 - 97.8 fL    MCH 29.7 27.0 - 33.0 pg    MCHC 34.4 33.6 - " 35.0 g/dL    RDW 38.5 35.9 - 50.0 fL    Platelet Count 232 164 - 446 K/uL    MPV 8.8 (L) 9.0 - 12.9 fL    Neutrophils-Polys 77.20 (H) 44.00 - 72.00 %    Lymphocytes 15.60 (L) 22.00 - 41.00 %    Monocytes 5.80 0.00 - 13.40 %    Eosinophils 0.30 0.00 - 6.90 %    Basophils 0.70 0.00 - 1.80 %    Immature Granulocytes 0.40 0.00 - 0.90 %    Nucleated RBC 0.20 /100 WBC    Neutrophils (Absolute) 7.50 (H) 2.00 - 7.15 K/uL    Lymphs (Absolute) 1.51 1.00 - 4.80 K/uL    Monos (Absolute) 0.56 0.00 - 0.85 K/uL    Eos (Absolute) 0.03 0.00 - 0.51 K/uL    Baso (Absolute) 0.07 0.00 - 0.12 K/uL    Immature Granulocytes (abs) 0.04 0.00 - 0.11 K/uL    NRBC (Absolute) 0.02 K/uL   COMP METABOLIC PANEL    Collection Time: 10/28/17  5:30 PM   Result Value Ref Range    Sodium 131 (L) 135 - 145 mmol/L    Potassium 3.1 (L) 3.6 - 5.5 mmol/L    Chloride 89 (L) 96 - 112 mmol/L    Co2 35 (H) 20 - 33 mmol/L    Anion Gap 7.0 0.0 - 11.9    Glucose 131 (H) 65 - 99 mg/dL    Bun 7 (L) 8 - 22 mg/dL    Creatinine 0.72 0.50 - 1.40 mg/dL    Calcium 8.8 8.5 - 10.5 mg/dL    AST(SGOT) 16 12 - 45 U/L    ALT(SGPT) 8 2 - 50 U/L    Alkaline Phosphatase 28 (L) 30 - 99 U/L    Total Bilirubin 0.5 0.1 - 1.5 mg/dL    Albumin 3.7 3.2 - 4.9 g/dL    Total Protein 5.7 (L) 6.0 - 8.2 g/dL    Globulin 2.0 1.9 - 3.5 g/dL    A-G Ratio 1.9 g/dL   ESTIMATED GFR    Collection Time: 10/28/17  5:30 PM   Result Value Ref Range    GFR If African American >60 >60 mL/min/1.73 m 2    GFR If Non African American >60 >60 mL/min/1.73 m 2   TROPONIN    Collection Time: 10/28/17  5:30 PM   Result Value Ref Range    Troponin I <0.01 0.00 - 0.04 ng/mL   D-DIMER    Collection Time: 10/28/17  5:30 PM   Result Value Ref Range    D-Dimer Screen <200 <250 ng/mL(D-DU)   EKG (ER)    Collection Time: 10/28/17  6:03 PM   Result Value Ref Range    Report       [facility] Emergency Dept.    Test Date:  2017-10-28  Pt Name:    KEIRYKATYA CHAWLA                 Department: ER  MRN:         [id number]                      Room:        04  Gender:     F                            Technician: [id number]  :        [id number]                   Requested By:ER TRIAGE PROTOCOL  Order #:    [id number]                    Reading MD: DANIELLE LANE    Measurements  Intervals                                Axis  Rate:       81                           P:  ME:                                      QRS:        86  QRSD:       98                           T:          231  QT:         420  QTc:        488    Interpretive Statements  ATRIAL FIBRILLATION  VENTRICULAR PREMATURE COMPLEX  BORDERLINE RIGHT AXIS DEVIATION  BORDERLINE REPOLARIZATION ABNORMALITY  BORDERLINE PROLONGED QT INTERVAL  ARTIFACT IN LEAD(S) I,II,III,aVL,aVF,V3,V4  Compared to ECG 2017 22:38:34  Ventricular premature complex(es) now present    Electronically Signed On 10-  20:17:03 PDT by DANIELLE LANE     INFLUENZA RAPID    Collection Time: 10/28/17  6:15 PM   Result Value Ref Range    Significant Indicator NEG     Source RESP     Site Nasopharyngeal     Rapid Influenza A-B       Negative for Influenza A and Influenza B antigens.  Infection due to influenza A or B cannot be ruled out  since the antigen present in the specimen may be below the  detection limit of the test. Culture confirmation of  negative samples is recommended.         (click the triangle to expand results)  My interpretation of lab results:   Anemia, hyponatremia, hypokalemia, alkalemia, increased BNP    Imaging/Procedures Review:    DX-CHEST-LIMITED (1 VIEW)   Final Result      1.  There is hyperinflation suggesting COPD.   2.  There is no acute cardiopulmonary process.      ECHOCARDIOGRAM COMP W/O CONT    (Results Pending)     EKG:   per my independant read:  QTc: 488, HR: 81, afib, PVC    Assessment and Plan:      COPD exacerbation (CMS-[facility])   Assessment & Plan    Severe obstructive disease on 4L O2, current tobacco user, continuing to have multiple admissions for  treatment. Negative for influenza, PNA, PE. BNP mildly elevated but not hypervolemic on exam.   Noted to be alkalemic, bicarb has ranged 20-42 in the past.  - alb/atrovent q4h  - mag sulfate 2g  - continue on steroids  - doxycycline  - ECHO ordered        Hyponatremia- (present on admission)   Assessment & Plan    Likely dehydration, not eating well for 2 days.   - IVF        Hypokalemia- (present on admission)   Assessment & Plan    Repleted, recheck in am.        Anemia   Assessment & Plan    Normocytic anemia. Baseline Hgb 12-13.  - ordered iron studies        Paroxysmal a-fib (CMS-HCC)- (present on admission)   Assessment & Plan    Currently in afib, rate controlled. Patient declined anticoagulation in the past.  - continue PTA metop, asa        Tobacco dependence- (present on admission)   Assessment & Plan    We discussed smoking cessation. Currently smokes about 2 cigs a day, has tried nicotine in the past. We discussed medications for quitting, and dangers of smoking. Time spent was 10 minutes.          HTN (hypertension)- (present on admission)   Assessment & Plan    Continue PTA norvasc, lisinopril, metop            1. Prophylaxis: sc heparin  2. Code: DNR per patient   3. Dispo: admitted to obs for COPD treatment, ECHO

## 2017-10-29 NOTE — PROGRESS NOTES
A/o,assessment completed per CDU,poc discussed,verbalized understanding,denies pain,call button within reach,will continue to monitor.

## 2017-10-29 NOTE — ASSESSMENT & PLAN NOTE
Severe obstructive disease on 4L O2, current tobacco user, continuing to have multiple admissions for treatment. Negative for influenza, PNA, PE. BNP mildly elevated but not hypervolemic on exam.   Noted to be alkalemic, bicarb has ranged 20-42 in the past.  - alb/atrovent q4h  - mag sulfate 2g  - continue on steroids  - doxycycline  - ECHO ordered

## 2017-10-29 NOTE — DISCHARGE INSTRUCTIONS
Chronic Obstructive Pulmonary Disease  Chronic obstructive pulmonary disease (COPD) is a common lung problem. In COPD, the flow of air from the lungs is limited. The way your lungs work will probably never return to normal, but there are things you can do to improve your lungs and make yourself feel better. Your doctor may treat your condition with:  · Medicines.  · Oxygen.  · Lung surgery.  · Changes to your diet.  · Rehabilitation. This may involve a team of specialists.  HOME CARE  · Take all medicines as told by your doctor.  · Avoid medicines or cough syrups that dry up your airway (such as antihistamines) and do not allow you to get rid of thick spit. You do not need to avoid them if told differently by your doctor.  · If you smoke, stop. Smoking makes the problem worse.  · Avoid being around things that make your breathing worse (like smoke, chemicals, and fumes).  · Use oxygen therapy and therapy to help improve your lungs (pulmonary rehabilitation) if told by your doctor. If you need home oxygen therapy, ask your doctor if you should buy a tool to measure your oxygen level (oximeter).  · Avoid people who have a sickness you can catch (contagious).  · Avoid going outside when it is very hot, cold, or humid.  · Eat healthy foods. Eat smaller meals more often. Rest before meals.  · Stay active, but remember to also rest.  · Make sure to get all the shots (vaccines) your doctor recommends. Ask your doctor if you need a pneumonia shot.  · Learn and use tips on how to relax.  · Learn and use tips on how to control your breathing as told by your doctor. Try:  ¨ Breathing in (inhaling) through your nose for 1 second. Then, pucker your lips and breath out (exhale) through your lips for 2 seconds.  ¨ Putting one hand on your belly (abdomen). Breathe in slowly through your nose for 1 second. Your hand on your belly should move out. Pucker your lips and breathe out slowly through your lips. Your hand on your belly  should move in as you breathe out.  · Learn and use controlled coughing to clear thick spit from your lungs. The steps are:  1. Lean your head a little forward.  2. Breathe in deeply.  3. Try to hold your breath for 3 seconds.  4. Keep your mouth slightly open while coughing 2 times.  5. Spit any thick spit out into a tissue.  6. Rest and do the steps again 1 or 2 times as needed.  GET HELP IF:  · You cough up more thick spit than usual.  · There is a change in the color or thickness of the spit.  · It is harder to breathe than usual.  · Your breathing is faster than usual.  GET HELP RIGHT AWAY IF:  · You have shortness of breath while resting.  · You have shortness of breath that stops you from:  ¨ Being able to talk.  ¨ Doing normal activities.  · You chest hurts for longer than 5 minutes.  · Your skin color is more blue than usual.  · Your pulse oximeter shows that you have low oxygen for longer than 5 minutes.  MAKE SURE YOU:  · Understand these instructions.  · Will watch your condition.  · Will get help right away if you are not doing well or get worse.     This information is not intended to replace advice given to you by your health care provider. Make sure you discuss any questions you have with your health care provider.     Document Released: 06/05/2009 Document Revised: 01/08/2016 Document Reviewed: 08/14/2014  [employer] Interactive Patient Education ©2016 [employer]  Discharge Instructions    Discharged to home by car with relative. Discharged via walking, hospital escort: Yes.  Special equipment needed: Not Applicable    Be sure to schedule a follow-up appointment with your primary care doctor or any specialists as instructed.     Discharge Plan:   Diet Plan: Discussed  Activity Level: Discussed  Smoking Cessation Offered: Patient Refused  Confirmed Follow up Appointment: Patient to Call and Schedule Appointment  Confirmed Symptoms Management: Discussed  Medication Reconciliation Updated:  Yes  Influenza Vaccine Indication: Not indicated: Previously immunized this influenza season and > 8 years of age    I understand that a diet low in cholesterol, fat, and sodium is recommended for good health. Unless I have been given specific instructions below for another diet, I accept this instruction as my diet prescription.   Other diet: Regular    Special Instructions: None    · Is patient discharged on Warfarin / Coumadin?   No     · Is patient Post Blood Transfusion?  No    Depression / Suicide Risk    As you are discharged from this [facility] facility, it is important to learn how to keep safe from harming yourself.    Recognize the warning signs:  · Abrupt changes in personality, positive or negative- including increase in energy   · Giving away possessions  · Change in eating patterns- significant weight changes-  positive or negative  · Change in sleeping patterns- unable to sleep or sleeping all the time   · Unwillingness or inability to communicate  · Depression  · Unusual sadness, discouragement and loneliness  · Talk of wanting to die  · Neglect of personal appearance   · Rebelliousness- reckless behavior  · Withdrawal from people/activities they love  · Confusion- inability to concentrate     If you or a loved one observes any of these behaviors or has concerns about self-harm, here's what you can do:  · Talk about it- your feelings and reasons for harming yourself  · Remove any means that you might use to hurt yourself (examples: pills, rope, extension cords, firearm)  · Get professional help from the community (Mental Health, Substance Abuse, psychological counseling)  · Do not be alone:Call your Safe Contact- someone whom you trust who will be there for you.  · Call your local CRISIS HOTLINE 659-1394 or 090-137-5452  · Call your local Children's Mobile Crisis Response Team [place] (745) 086-2826 or www.[employer]  · Call the toll free National Suicide Prevention Hotlines    · National Suicide Prevention Lifeline 950-194-NQTU (3905)  · National Hope Line Network 800-SUICIDE (369-8508)

## 2017-10-29 NOTE — ED NOTES
Pt medicated per MAR.  Pt informed of bed assignment.  Report given to DAMEON Angelo.  Transport at bedside for transfer to [facility].  All belongings with pt.

## 2017-10-29 NOTE — ED NOTES
Pt BIB EMS from home.  Pt c/o SOB increasing today, H/o COPD, recent dx and treatment of PNA.  Pt c/o productive cough with green/yellow sputum.  Pt reports she increased O2 yesterday, normally on 4 liters.  ERP to see.

## 2017-10-30 NOTE — PROGRESS NOTES
Melissa from Lab called with critical result of Positive Culture at 2:36pm. Critical lab result read back to Melissa.   Dr. Goncalves notified of critical lab result at 2:36.  Critical lab result read back by Dr. Goncalves.

## 2017-10-30 NOTE — DISCHARGE SUMMARY
DATE OF ADMISSION:  10/28/2017    DATE OF DISCHARGE:  10/29/2017    DISCHARGE DIAGNOSES:  1.  Chronic obstructive pulmonary disease exacerbation.  2.  Advanced chronic obstructive pulmonary disease.  3.  Hyponatremia.  4.  Normocytic anemia.  5.  Paroxysmal atrial fibrillation.  6.  Tobacco dependence.  7.  Hypertension.  8.  Macular degeneration.  9.  Anxiety disorder.  10.  Depression.  11.  Dyslipidemia.    IMAGING STUDIES:  1.  Two-view chest x-ray.  Findings, no radiographic evidence of acute   pulmonary process.  Stable hyperinflation and areas of scarring.  2.  Cardiac echo 10/29/2017.  Findings:  Normal left ventricular size wall   thickness and systolic function.  Mild mitral regurgitation.  Normal atrial   size.  Aortic sclerosis without stenosis.  Moderate tricuspid regurgitation.    Right-sided pressures 35 mmHg.    LABORATORY DATA:  On discharge, white count 9.0, hemoglobin 10.1, platelet   count 190.  Sodium 129, potassium 3.7, BUN 9, creatinine 0.74.  Procalcitonin   less than 0.05.  Nasal swab negative for influenza.    HOSPITAL COURSE:  Briefly, this is a 77-year-old female who has advanced COPD.    She is normally dependent up on 4 liters of home O2 at her baseline.  She   presented with shortness of breath, which had begun earlier in the morning of   the day of admission.  She felt quite panic, and came in for evaluation.  She   did not try any of her nebulizers or inhalers at home.    In the emergency room, patient was evaluated and found to be suffering from   COPD exacerbation.  Her chest x-ray was negative.  Her procalcitonin was low,   indicating low likelihood of pneumonia.  She did have an elevated CO2;   however, this is likely chronic for the patient given her advanced COPD.  She   was admitted and started empirically on doxycycline as well as aggressive O2   and respiratory protocols and IV steroids.    On the subsequent day, the patient reports that she is back to her baseline.    She  is eager to go home.  Her lung exam has improved, still not normal, but   what is normal for this patient.  Her vital signs were benign.  Her exam was   stable for her.    At this point, the patient has been discharged home to follow up with her   primary care physician.  We will be discharging the patient on prednisone and   doxycycline as well as back to her baseline medications.    DISCHARGE MEDICATIONS:  1.  Doxycycline 100 mg p.o. b.i.d.  2.  Nicoderm patch.  3.  Prednisone 20 mg p.o. daily.  4.  Metoprolol SR 50 mg p.o. daily.  5.  Lisinopril 20 mg p.o. b.i.d.  6.  Zocor 20 mg in the evening.  7.  Clonazepam 0.5 mg every 12 hours p.r.n. anxiety.  8.  Symbicort 160/4.5.  9.  DuoNeb p.r.n.  10.  BuSpar 5 mg p.o. daily.  11.  Aspirin 81 mg p.o. daily.    FOLLOWUP:  The patient is to follow up with her primary care physician in the   next week.  I have reviewed discharge instructions with the patient including   standard discharge instructions for this diagnosis and she has verbalized   understanding of them.  She is eager to go home.       ____________________________________     DO NORMAN So / JOSIAH    DD:  10/30/2017 14:03:29  DT:  10/30/2017 14:22:55    D#:  [id number]  Job#:  [id number]    cc: Mireille Benito MD

## 2017-10-30 NOTE — PROGRESS NOTES
"Called by lab for + blood cultures: 1 of 2 for G+C likely strep species.  Reviewed with ID who recommend change to Augmentin po  Called pt who states she feels \"fine\", a little tired but no other complaints.  Better then when she came to the hospital.  No fever, chills, abd pain, dysuria.  Breathing at what she considers is her normal.    I explained her test results to her and have called in a script for Augmentin to her [facility] pharmacy.   I instructed the pt to make sure to start it tonight, she states she can get the medicine today and will start it tonight.  Reviewed allergies with her.  Pt is to stop taking her Doxycycline.  Instructed her as well to come to the ED immediately for any worsening; reviewed details with her.  She contracts to do so.  All questions answered.  Dw my Administrative staff as I am off tomorrow.  They will follow up on sensitivities tomorrow and review with covering physician.  Will call pt for follow up.  I've asked them to call me with any issues.  "

## 2017-11-01 NOTE — TELEPHONE ENCOUNTER
Called Mrs. Mistry.  She picked up and is taking the medication Dr. Goncalves called into [facility].  She denies any new signs or symptoms and reports no new fevers, aches or difficulties.  She agrees to contact her physician if this changes.  She thanks Dr. Goncalves for his follow up and taking all precautions.

## 2017-11-02 NOTE — DISCHARGE INSTRUCTIONS
How to Use a Nebulizer  If you have asthma or other breathing problems, you might need to breathe in (inhale) medicine. This can be done with a nebulizer. A nebulizer is a device that turns liquid medicine into a mist that you can inhale.   There are different kinds of nebulizers. Most are small. With some, you breathe in through a mouthpiece. With others, a mask fits over your nose and mouth. Most nebulizers must be connected to a small air compressor. Air is forced through tubing from the compressor to the nebulizer. The forced air changes the liquid into a fine spray.  RISKS AND COMPLICATIONS  The nebulizer must work properly for it to help your breathing. If the nebulizer does not produce mist, or if foam comes out, this indicates that the nebulizer is not working properly. Sometimes a filter can get clogged, or there might be a problem with the air compressor. Check the instruction booklet that came with your nebulizer. It should tell you how to fix problems or where to call for help. You should have at least one extra nebulizer at home. That way, you will always have one when you need it.   HOW TO PREPARE BEFORE USING THE NEBULIZER  Take these steps before using the nebulizer:  1. Check your medicine. Make sure it has not  and is not damaged in any way.    2. Wash your hands with soap and water.    3. Put all the parts of your nebulizer on a sturdy, flat surface. Make sure the tubing connects the compressor and the nebulizer.  4. Measure the liquid medicine according to your health care provider's instructions. Pour it into the nebulizer.  5. Attach the mouthpiece or mask.    6. Test the nebulizer by turning it on to make sure a spray is coming out. Then, turn it off.    HOW TO USE THE NEBULIZER  1. Sit down and focus on staying relaxed.    2. If your nebulizer has a mask, put it over your nose and mouth. If you use a mouthpiece, put it in your mouth. Press your lips firmly around the  mouthpiece.  3. Turn on the nebulizer.    4. Breathe out.    5. Some nebulizers have a finger valve. If yours does, cover up the air hole so the air gets to the nebulizer.  6. Once the medicine begins to mist out, take slow, deep breaths. If there is a finger valve, release it at the end of your breath.  7. Continue taking slow, deep breaths until the nebulizer is empty.    Be sure to stop the machine at any point if you start coughing or if the medicine foams or bubbles.  HOW TO CLEAN THE NEBULIZER   The nebulizer and all its parts must be kept very clean. Follow the 's instructions for cleaning. For most nebulizers, you should follow these guidelines:  · Wash the nebulizer after each use. Use warm water and soap. Rinse it well. Shake the nebulizer to remove extra water. Put it on a clean towel until it is completely dry. To make sure it is dry, put the nebulizer back together. Turn on the compressor for a few minutes. This will blow air through the nebulizer.    · Do not wash the tubing or the finger valve.    · Store the nebulizer in a dust-free place.    · Inspect the filter every week. Replace it any time it looks dirty.    · Sometimes the nebulizer will need a more complete cleaning. The instruction booklet should say how often you need to do this.  SEEK MEDICAL CARE IF:   · You continue to have difficulty breathing.    · You have trouble using the nebulizer.       This information is not intended to replace advice given to you by your health care provider. Make sure you discuss any questions you have with your health care provider.     Document Released: 12/06/2010 Document Revised: 01/08/2016 Document Reviewed: 06/09/2014  [employer] Interactive Patient Education ©2016 [employer]  Generalized Anxiety Disorder  Generalized anxiety disorder (CORINNA) is a mental disorder. It interferes with life functions, including relationships, work, and school.  CORINNA is different from normal anxiety, which everyone  experiences at some point in their lives in response to specific life events and activities. Normal anxiety actually helps us prepare for and get through these life events and activities. Normal anxiety goes away after the event or activity is over.   CORINNA causes anxiety that is not necessarily related to specific events or activities. It also causes excess anxiety in proportion to specific events or activities. The anxiety associated with CORINNA is also difficult to control. CORINNA can vary from mild to severe. People with severe CORINNA can have intense waves of anxiety with physical symptoms (panic attacks).   SYMPTOMS  The anxiety and worry associated with CORINNA are difficult to control. This anxiety and worry are related to many life events and activities and also occur more days than not for 6 months or longer. People with CORINNA also have three or more of the following symptoms (one or more in children):  · Restlessness.    · Fatigue.  · Difficulty concentrating.    · Irritability.  · Muscle tension.  · Difficulty sleeping or unsatisfying sleep.  DIAGNOSIS  CORINNA is diagnosed through an assessment by your health care provider. Your health care provider will ask you questions about your mood, physical symptoms, and events in your life. Your health care provider may ask you about your medical history and use of alcohol or drugs, including prescription medicines. Your health care provider may also do a physical exam and blood tests. Certain medical conditions and the use of certain substances can cause symptoms similar to those associated with CORINNA. Your health care provider may refer you to a mental health specialist for further evaluation.  TREATMENT  The following therapies are usually used to treat CORINNA:   · Medication. Antidepressant medication usually is prescribed for long-term daily control. Antianxiety medicines may be added in severe cases, especially when panic attacks occur.    · Talk therapy (psychotherapy). Certain  types of talk therapy can be helpful in treating CORINNA by providing support, education, and guidance. A form of talk therapy called cognitive behavioral therapy can teach you healthy ways to think about and react to daily life events and activities.  · Stress management techniques. These include yoga, meditation, and exercise and can be very helpful when they are practiced regularly.  A mental health specialist can help determine which treatment is best for you. Some people see improvement with one therapy. However, other people require a combination of therapies.     This information is not intended to replace advice given to you by your health care provider. Make sure you discuss any questions you have with your health care provider.     Document Released: 04/14/2014 Document Revised: 01/08/2016 Document Reviewed: 04/14/2014  [employer] Interactive Patient Education ©2016 [employer]

## 2017-11-02 NOTE — ED NOTES
Pt back from RAD. Pt states to be feeling a little better after the breathing treatment. Pt denies any other needs. Pt placed back on monitor.

## 2017-11-02 NOTE — ED NOTES
Pt presents to ED via EMS for c/o SOB. Per EMS pt was seen here a few days ago and diagnosed with bacterial pneumonia. Pt was started on Augmentin and prednisone. Pt states she went home and was not getting any better. Pt has increase work of breathing with labored breathing and accessory muscle use. Pt is on home 02 at 2L continuous. Per EMS son reported to them that pt gets better here but when she goes home she gets worse. Pt reports to being a continued smoker. Pt admits to having 2-3 cigarettes a day.

## 2017-11-02 NOTE — ED PROVIDER NOTES
ED Provider Note    Scribed for Napoleon Schwarz M.D. by Kalina Young. 11/1/2017  6:10 PM    Primary care provider: Mireille Benito M.D.  Means of arrival: ambulance  History obtained from: Patient  History limited by: None    CHIEF COMPLAINT  Chief Complaint   Patient presents with   • Shortness of Breath   • Nausea   • Cough       HPI  Yoko Mistry is a 77 y.o. female with a history of COPD and hypertension who presents to the Emergency Department with shortness of breath onset last week with associated decreased appetite. Patient was evaluated here several days ago, diagnosed with bacterial pneumonia, and placed on Augmentin and Prednisone. Since discharge, she states that her difficulty breathing has not improved but gradually worsened. Patient used her breathing treatments 3x today with no real improvement to the difficulty breathing. She states that she felt well approximately a week and a half ago. At home the patient is still able to carry out all normal daily living activities. Patient is normally on 4L O2 at home. She smokes 2-3 cigarettes a day.   No complaints of fever.    REVIEW OF SYSTEMS  Pertinent negatives include no fever. As above, all other systems reviewed and are negative.   See HPI for further details.     PAST MEDICAL HISTORY   has a past medical history of Anxiety disorder (7/11/2011); Chickenpox; COPD; COPD (chronic obstructive pulmonary disease) (CMS-HCC) (7/11/2011); Depression; Lao measles; HTN (hypertension) (7/11/2011); Hyperlipidemia; Hypertension; Influenza; Macular degeneration (02/17); Mumps; and Smallpox.    SURGICAL HISTORY   has a past surgical history that includes femoral popliteal angioplasty with stent (2009); cholecystectomy; abdominal hysterectomy total; hysterectomy laparoscopy; and cardiovascular.    SOCIAL HISTORY  Social History   Substance Use Topics   • Smoking status: Current Every Day Smoker     Packs/day: 0.25     Years: 40.00     Types: Cigarettes   • Smokeless  "tobacco: Never Used      Comment: Down to 5 cigs a day per patient  Started in her 30s   • Alcohol use No      History   Drug Use No       FAMILY HISTORY  Family History   Problem Relation Age of Onset   • Heart Disease Mother      aortic stenosis   • Heart Disease Father    • Cancer Brother        CURRENT MEDICATIONS  Home Medications     Reviewed by Edna Louie R.N. (Registered Nurse) on 11/01/17 at 1802  Med List Status: Partial   Medication Last Dose Status   aspirin EC (ECOTRIN) 81 MG TBEC 10/27/2017 Active   busPIRone (BUSPAR) 5 MG Tab 10/27/2017 Active   clonazepam (KLONOPIN) 0.5 MG Tab 10/27/2017 Active   doxycycline monohydrate (ADOXA) 100 MG tablet  Active   ipratropium-albuterol (DUONEB) 0.5-2.5 (3) MG/3ML nebulizer solution 10/28/2017 Active   lisinopril (PRINIVIL) 20 MG Tab 10/28/2017 Active   metoprolol SR (TOPROL XL) 50 MG TABLET SR 24 HR 10/28/2017 Active   nicotine (NICODERM) 14 MG/24HR PATCH 24 HR  Active   predniSONE (DELTASONE) 20 MG Tab  Active   simvastatin (ZOCOR) 20 MG Tab 10/27/2017 Active   SYMBICORT 160-4.5 MCG/ACT Aerosol 10/28/2017 Active   VENTOLIN  (90 Base) MCG/ACT Aero Soln inhalation aerosol 10/28/2017 Active                ALLERGIES  Allergies   Allergen Reactions   • Citalopram Unspecified     Low sodium       PHYSICAL EXAM  VITAL SIGNS: /74   Pulse 90   Temp 36.8 °C (98.3 °F)   Resp (!) 28   Ht 1.702 m (5' 7\")   Wt 45.4 kg (100 lb)   SpO2 99%   BMI 15.66 kg/m²     Constitutional: thin body habitus, with barrel chest, Well developed, No acute distress,  HENT: Normocephalic, Atraumatic, Bilateral external ears normal, Oropharynx is clear mucous membranes are moist. No oral exudates or nasal discharge.   Eyes: Pupils are equal round and reactive, EOMI, Conjunctiva normal, No discharge.   Neck: Normal range of motion, No tenderness, Supple, No stridor. No meningismus.  Lymphatic: No lymphadenopathy noted.   Cardiovascular: Regular rate and rhythm without " murmur rub or gallop.  Thorax & Lungs: poor inspiratory effort, prolonged expiratory phase, without wheezes, rhonchi or rales. There is no chest wall tenderness. Status post bilateral mastectomy.  Abdomen: Soft non-tender non-distended. There is no rebound or guarding. No organomegaly is appreciated. Bowel sounds are normal.  Skin: Normal without rash.   Extremities: Intact distal pulses, No edema, No tenderness, No cyanosis, No clubbing. Capillary refill is less than 2 seconds.  Musculoskeletal: Good range of motion in all major joints. No tenderness to palpation or major deformities noted.   Neurologic: Alert & oriented x 3, Normal motor function, Normal sensory function, No focal deficits noted. Reflexes are normal.  Psychiatric: anxious, Judgment normal, Mood normal. There is no suicidal ideation or patient reported hallucinations.       DIAGNOSTIC STUDIES / PROCEDURES    RADIOLOGY  DX-CHEST-2 VIEWS   Final Result      COPD.        The radiologist's interpretation of all radiological studies have been reviewed by me.    COURSE & MEDICAL DECISION MAKING  Nursing notes, VS, PMSFHx reviewed in chart.    6:10 PM Patient seen and examined at bedside. Ordered for chest xray to evaluate. Patient was treated with 5mg IV Decadron, 3mg nebulizer Duoneb, and 0.5mg Ativan tablet for her symptoms. I informed the patient that another chest xray would be completed and compared to the one completed when she was evaluated here several days ago and see if there is any significant change. Patient understands and agrees with treatment plan.     6:26 PM I spoke with patient's son. I explained that her vital signs are good at this time. He describes that the patient appears well when she is in the hospital, however, the second she goes home, she begins to worsen, acting apprehensive and anxious. I explained that she would be administered a breathing treatment to try and relieve her symptoms.    8:19 PM I re-evaluated patient at  bedside and informed her that her chest xray is unchanged from prior. I explained that she should continue on her antibiotics to treat the pneumonia and she would be able to be discharged home.I think she just has some worsening anxiety. There is no evidence of new airspace disease    The patient will return for new or worsening symptoms and is stable at the time of discharge.    DISPOSITION:  Patient will be discharged home in stable condition.    FINAL IMPRESSION  1. Anxiety    2. Chronic obstructive pulmonary disease, unspecified COPD type (CMS-HCC)          Kalina SALAS (Scribe), am scribing for, and in the presence of, Napoleon Schwarz M.D..    Electronically signed by: Kalina Young (Scribe), 11/1/2017    Napoleon SALAS M.D. personally performed the services described in this documentation, as scribed by Kalina Young in my presence, and it is both accurate and complete.    The note accurately reflects work and decisions made by me.  Napoleon Schwarz  11/1/2017  8:27 PM

## 2017-11-02 NOTE — ED NOTES
Assist RN: Pt discharged at this time, provided w/ discharge paperwork and education on follow up, son accompanying Pt.

## 2017-11-04 PROBLEM — A41.9 SEPSIS (HCC): Status: ACTIVE | Noted: 2017-01-01

## 2017-11-04 PROBLEM — E43 PROTEIN-CALORIE MALNUTRITION, SEVERE (HCC): Status: ACTIVE | Noted: 2017-01-01

## 2017-11-05 PROBLEM — R65.20 SEPSIS WITH METABOLIC ENCEPHALOPATHY (HCC): Status: ACTIVE | Noted: 2017-01-01

## 2017-11-05 PROBLEM — G93.41 SEPSIS WITH METABOLIC ENCEPHALOPATHY (HCC): Status: ACTIVE | Noted: 2017-01-01

## 2017-11-05 PROBLEM — J96.21 ACUTE ON CHRONIC RESPIRATORY FAILURE WITH HYPOXEMIA (HCC): Status: ACTIVE | Noted: 2017-01-01

## 2017-11-05 PROBLEM — A41.9 SEPSIS WITH METABOLIC ENCEPHALOPATHY (HCC): Status: ACTIVE | Noted: 2017-01-01

## 2017-11-05 NOTE — ASSESSMENT & PLAN NOTE
- This is severe sepsis with the following associated acute organ dysfunction(s): metabolic/septic encephalopathy.   Patient is now care and comfort

## 2017-11-05 NOTE — PROGRESS NOTES
2 RN skin check preformed. No evidence of skin breakdown noted on bony prominences, but both elbows and buttocks were red and blanching. Silicone oxygenation tubing applied for prevention.

## 2017-11-05 NOTE — PROGRESS NOTES
Morphine PCA started, PCA control in pt hand, pt educated on use of of PCA. pt off O2.   Pt agitated, A & O x2 to person and time.

## 2017-11-05 NOTE — PROGRESS NOTES
Hospitalist Dr. Oliva called and updated on pt's condition of restlessness and hallucination. Order for Ativan 2mg IV once given.

## 2017-11-05 NOTE — PROGRESS NOTES
Pt became restless again and Hospitalist Dr. Hazel campos. PRN order for Ativan 1mg IV q 6 hrs ordered. Pt resting more comfortably in bed.

## 2017-11-05 NOTE — RESPIRATORY CARE
COPD EDUCATION by COPD CLINICAL EDUCATOR  11/5/2017 at 7:52 AM by Mary Jimenez     Patient reviewed by COPD education team. Patient does not qualify for COPD program.

## 2017-11-05 NOTE — ED NOTES
Medicated per MAR for continued agitation,  Trying to pull cords and IV.  Family at bedside.  Deny needs at this time.

## 2017-11-05 NOTE — ED NOTES
Partial med rec  Unable to complete with patient, son is going to call us back with information  Allergies verified

## 2017-11-05 NOTE — ED NOTES
PIV placed, labs drawn and sent.  Mini cath done,  Pt tolerated well.   Urine obtained and sent.

## 2017-11-05 NOTE — ED NOTES
Med Rec completed per patients son  Augmentin  Started 10/30/17  10 day course  Prednisone 20 mg 10/30/17 3 day course   Unable to confirm last doses beside ABX, per patients son.

## 2017-11-05 NOTE — H&P
Hospital Medicine History and Physical    Date of Service  11/4/2017    Chief Complaint  Chief Complaint   Patient presents with   • Altered Mental Status       History of Presenting Illness  77 y.o. female who presented 11/4/2017 withAltered mental status. Patient has improved per family at this time however unable to obtain information from her, information obtained from the family. This morning around 3 family member noted that she was talking to people that weren't there, she didn't recognize her son. This continued throughout the day, no improvement so patient was brought to differentiate apartment. Patient did have a similar episode 6 weeks ago when she had pneumonia. 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 Patient has had multiple COPD exacerbations recently     Primary Care Physician  Mireille Benito M.D.    Consultants  None    Code Status  DNR    Review of Systems  Review of Systems   Unable to perform ROS: Acuity of condition        Past Medical History  Past Medical History:   Diagnosis Date   • Macular degeneration 02/17   • HTN (hypertension) 7/11/2011   • COPD (chronic obstructive pulmonary disease) (CMS-HCC) 7/11/2011   • Anxiety disorder 7/11/2011   • Chickenpox    • COPD    • Depression    • [place] measles    • Hyperlipidemia    • Hypertension    • Influenza    • Mumps    • Smallpox        Surgical History  Past Surgical History:   Procedure Laterality Date   • FEMORAL POPLITEAL ANGIOPLASTY WITH STENT  2009   • ABDOMINAL HYSTERECTOMY TOTAL     • CARDIOVASCULAR      cardio ablation   • CHOLECYSTECTOMY     • HYSTERECTOMY LAPAROSCOPY         Medications  No current facility-administered medications on file prior to encounter.      Current Outpatient Prescriptions on File Prior to Encounter   Medication Sig Dispense Refill   • doxycycline monohydrate (ADOXA) 100 MG tablet Take 1 Tab by mouth every 12 hours. 10 Tab 0   • predniSONE (DELTASONE) 20 MG Tab Take  1 Tab by mouth every day. 3 Tab 0   • nicotine (NICODERM) 14 MG/24HR PATCH 24 HR Apply 1 Patch to skin as directed every 24 hours. 30 Patch 1   • VENTOLIN  (90 Base) MCG/ACT Aero Soln inhalation aerosol INHALE 2 PUFFS BY MOUTH EVERY 6 HOURS AS NEEDED FOR SHORTNESS OF BREATH. 18 Inhaler 2   • metoprolol SR (TOPROL XL) 50 MG TABLET SR 24 HR TAKE 1 TABLET BY MOUTH DAILY 90 Tab 1   • clonazepam (KLONOPIN) 0.5 MG Tab Take 0.5 Tabs by mouth every 12 hours as needed (anxiety). 30 Tab 0   • simvastatin (ZOCOR) 20 MG Tab TAKE 1 TABLET BY MOUTH EVERY EVENING 90 Tab 1   • ipratropium-albuterol (DUONEB) 0.5-2.5 (3) MG/3ML nebulizer solution INHALE 1 VIAL VIA NEBULIZER 4 TIMES A DAY 90 mL 5   • busPIRone (BUSPAR) 5 MG Tab Take 1 Tab by mouth every day. 30 Tab 5   • SYMBICORT 160-4.5 MCG/ACT Aerosol INHALE 2 PUFFS BY MOUTH 2 TIMES A DAY. USE SPACER. RINSE MOUTH AFTER EACH USE. 1 Inhaler 5   • lisinopril (PRINIVIL) 20 MG Tab TAKE 1 TABLET BY MOUTH 2 TIMES A  Tab 1   • aspirin EC (ECOTRIN) 81 MG TBEC Take 81 mg by mouth every day.         Family History  Family History   Problem Relation Age of Onset   • Heart Disease Mother      aortic stenosis   • Heart Disease Father    • Cancer Brother        Social History  Social History   Substance Use Topics   • Smoking status: Current Every Day Smoker     Packs/day: 0.25     Years: 40.00     Types: Cigarettes   • Smokeless tobacco: Never Used      Comment: Down to 5 cigs a day per patient  Started in her 30s   • Alcohol use No       Allergies  Allergies   Allergen Reactions   • Citalopram Unspecified     Low sodium        Physical Exam  Laboratory   Hemodynamics  Temp (24hrs), Av.1 °C (98.8 °F), Min:37.1 °C (98.8 °F), Max:37.1 °C (98.8 °F)   Temperature: 37.1 °C (98.8 °F)  Pulse  Av  Min: 92  Max: 96 Heart Rate (Monitored): 92  Blood Pressure : 152/90, NIBP: 140/71      Respiratory      Respiration: (!) 22, Pulse Oximetry: 99 %             Physical Exam    Constitutional: She appears cachectic. She appears ill. No distress.   HENT:   Head: Normocephalic and atraumatic.   Mouth/Throat: Oropharynx is clear and moist. No oropharyngeal exudate.   Eyes: Right eye exhibits no discharge. Left eye exhibits no discharge.   Neck: Neck supple. No tracheal deviation present.   Cardiovascular: Normal rate and regular rhythm.  Exam reveals no gallop and no friction rub.    No murmur heard.  Pulmonary/Chest: Effort normal. No stridor. No respiratory distress. She has decreased breath sounds. She has no wheezes. She has rales.   Abdominal: Soft. Bowel sounds are normal. She exhibits no distension.   Musculoskeletal: She exhibits no edema.   Lymphadenopathy:     She has no cervical adenopathy.   Neurological:   Awake, confused    Skin: Skin is warm and dry. No rash noted. She is not diaphoretic. No erythema.   Psychiatric: Her speech is tangential. She is slowed. Cognition and memory are impaired.   Nursing note and vitals reviewed.      Recent Labs      11/04/17   1721   WBC  20.2*   RBC  3.93*   HEMOGLOBIN  11.9*   HEMATOCRIT  34.2*   MCV  87.0   MCH  30.3   MCHC  34.8   RDW  40.1   PLATELETCT  308   MPV  8.7*     Recent Labs      11/04/17   1721   SODIUM  128*   POTASSIUM  3.6   CHLORIDE  94*   CO2  24   GLUCOSE  124*   BUN  15   CREATININE  0.67   CALCIUM  9.2     Recent Labs      11/04/17   1721   ALTSGPT  9   ASTSGOT  24   ALKPHOSPHAT  31   TBILIRUBIN  0.7   GLUCOSE  124*     Recent Labs      11/04/17   1721   APTT  23.9*   INR  0.94             Lab Results   Component Value Date    TROPONINI <0.01 10/28/2017     Urinalysis:    Lab Results  Component Value Date/Time   SPECGRAVITY 1.015 11/04/2017 1910   GLUCOSEUR Negative 11/04/2017 1910   KETONES Trace (A) 11/04/2017 1910   NITRITE Negative 11/04/2017 1910   WBCURINE >150 (A) 10/05/2011 0019   RBCURINE >150 (A) 10/05/2011 0019   BACTERIA Few (A) 10/05/2011 0019   EPITHELCELL Rare 10/05/2011 0019        Imaging  Chest x-ray-no  acute cardiopulmonary process    Assessment/Plan     I anticipate this patient will require at least two midnights for appropriate medical management, necessitating inpatient admission.    COPD exacerbation (CMS-HCC)- (present on admission)   Assessment & Plan    - Likely due to pneumonia  - initiate IV antibiotics, IV steroids as well as rest care protocol for frequent nebulization        Hyponatremia- (present on admission)   Assessment & Plan    - Likely due to dehydration, also with hypochloremia  - give IV fluids and repeat a BMP in the morning        Protein-calorie malnutrition, severe (CMS-HCC)   Assessment & Plan    - monitor by mouth intake        Sepsis (CMS-HCC)   Assessment & Plan    - This is severe sepsis with the following associated acute organ dysfunction(s): metabolic/septic encephalopathy.   - Likely due to pneumonia  - Sepsis order set has been initiated  - Patient will receive significant IV fluids  - Await culture results  - Patient will be treated for community acquired pneumonia with IV Unasyn and azithromycin          Normocytic anemia- (present on admission)   Assessment & Plan    - No sign of gross bleeding  - Repeat CBC in the morning        HTN (hypertension)- (present on admission)   Assessment & Plan    - Continue home metoprolol and lisinopril  - Adjust as needed            VTE prophylaxis:Lovenox .

## 2017-11-05 NOTE — PROGRESS NOTES
Pt brought up to unit with transport. Pt actively hallucinating and fidgeting/pulling at gown and anything available to her. Pt came with soft wrist restraints already applied per active order. Pt able to comprehend name when talking directly at pt, but was unable to assess mentation. Pt using 4L O2 NC; tolerating well. Respiratory to bedside; treatments in place. Will update physician on restless status. Bed in lowest position, call light within reach, soft wrist restraints in use (CMS intact), and will continue to monitor.

## 2017-11-05 NOTE — CARE PLAN
Problem: Communication  Goal: The ability to communicate needs accurately and effectively will improve  Outcome: PROGRESSING AS EXPECTED  Pt's son, Yoav, updated on POC and pt's condition. Will update day shift and talk to care coordination.     Problem: Safety  Goal: Will remain free from injury  Outcome: PROGRESSING AS EXPECTED  Proper fall precautions in place. Call light within reach and encouraged to use. Hourly rounding in practice. Bed alarm in use. Soft wrist restraints per active order in use for pt safety.

## 2017-11-05 NOTE — PROGRESS NOTES
RN notified by CNA that pt's BP was 60s/30s after several attempts. When this RN entered the room pt had decreased response to stimuli and only responded to pain. Charge RN, Lorena, called in. Pt still unable to arouse and oxygenation demand increased. PRN weight based bolus per active sepsis protocol started and respiratory paged. Hospitalist, Dr. Oliva paged and updated on pt's condition. Orders for stat lactic, chest x-ray, and respiratory treatment given. Pt continued to decline in oxygen saturation and per nursing judgment Rapid Response was called. At that time pt exhibited seizure like activity for approximately one minute. Pt placed on a non-re breather and additional labs ordered. Will continue to monitor.

## 2017-11-05 NOTE — ED NOTES
Pt arrived via EMS from home for confusion and hallucinations. Family noticed increasing confusion over course of today. She is responding to external stimuli and is oriented to self. No hx of dementia per EMS. Has been recently treated for URI.

## 2017-11-05 NOTE — ED NOTES
PT has been combative and agitated toward staff. She has been pull and grabbing at RN. She has pull out her IV.

## 2017-11-05 NOTE — PROGRESS NOTES
Pt's son Yoav at bedside and updated on pt's condition. He expressed his concerns and wants for home hospice. Will update day shift RN and care coordination about the different options going forward and physician discussion with the pt and family.

## 2017-11-05 NOTE — PROGRESS NOTES
Pt continues to be restless and anxious, 1mg IV ativan given per active order. Pt requesting 2 L of oxygen. Pt place on 2 L NC.

## 2017-11-05 NOTE — ED NOTES
Late entry- pt continues to pull at lines, becoming more disoriented/ hallucinating.  Soft restraints applied.  ERP notified.  Family at bedside.

## 2017-11-05 NOTE — ED PROVIDER NOTES
ED Provider Note    Scribed for Carlito Floyd M.D. by Carlito Floyd. 11/4/2017,  5:52 PM.    CHIEF COMPLAINT  Chief Complaint   Patient presents with   • Altered Mental Status       HPI  Yoko Mistry is a 77 y.o. female who presents to the Emergency DepartmentFor altered mental status and combativeness, starting sometime yesterday. She arrived by EMS, but is accompanied by her 2 sons who take close care of her. They report that she had increasing confusion over about the last 24 hours. She has been oriented only to herself. Although she has no history of dementia or psychosis, she has been talking to people who were not there, and responding To internal stimuli, gesticulating in the air. Her family is adamant that she has no history of this type of behavior. She has had no obvious increase in her oxygen requirement, which is 4 L at baseline. Her sons are aware that she has end-stage COPD, and she has been in and out of the hospital urgent care several times in the past couple of weeks. They report a fairly rapid deterioration over the past 4-5 weeks, whereas at that time, the patient was lifting a relatively normal life, driving herself to and from the store, carrying her own oxygen. At the bedside, she is very lucid really agitated, tearful, combative, pulled out her own IV and continues to seemingly hallucinate. She is obviously experiencing some psychosis versus delirium. Family reports that within the past couple hours, she was noted to be severely diaphoretic, and had a tactile fever. That seems to have resolved on its own. The family reports no nausea, no vomiting, no evidence of urinary tract or abdominal symptoms.    Family reports that the patient has a hospice appointment on Monday. One of her sons says that he would've liked to have involved hospice earlier.    The patient's records also show that she has received several rounds of steroids this week.         REVIEW OF SYSTEMS  See HPI for  further details. All other systems are negative.     PAST MEDICAL HISTORY   has a past medical history of Anxiety disorder (7/11/2011); Chickenpox; COPD; COPD (chronic obstructive pulmonary disease) (CMS-HCC) (7/11/2011); Depression; Portuguese measles; HTN (hypertension) (7/11/2011); Hyperlipidemia; Hypertension; Influenza; Macular degeneration (02/17); Mumps; and Smallpox.    SOCIAL HISTORY  Social History     Social History Main Topics   • Smoking status: Current Every Day Smoker     Packs/day: 0.25     Years: 40.00     Types: Cigarettes   • Smokeless tobacco: Never Used      Comment: Down to 5 cigs a day per patient  Started in her 30s   • Alcohol use No   • Drug use: No   • Sexual activity: Not on file     History   Drug Use No       SURGICAL HISTORY   has a past surgical history that includes femoral popliteal angioplasty with stent (2009); cholecystectomy; abdominal hysterectomy total; hysterectomy laparoscopy; and cardiovascular.    CURRENT MEDICATIONS  Home Medications     Reviewed by Elis Anaya (Pharmacy Tech) on 11/04/17 at 2014  Med List Status: Partial   Medication Last Dose Status   aspirin EC (ECOTRIN) 81 MG TBEC unknown Active   busPIRone (BUSPAR) 5 MG Tab unknown Active   clonazepam (KLONOPIN) 0.5 MG Tab unknown Active   doxycycline monohydrate (ADOXA) 100 MG tablet 11/4/2017 Active   ipratropium-albuterol (DUONEB) 0.5-2.5 (3) MG/3ML nebulizer solution unknown Active   lisinopril (PRINIVIL) 20 MG Tab unknown Active   metoprolol SR (TOPROL XL) 50 MG TABLET SR 24 HR unknown Active   nicotine (NICODERM) 14 MG/24HR PATCH 24 HR unknown Active   predniSONE (DELTASONE) 20 MG Tab unknown Active   simvastatin (ZOCOR) 20 MG Tab unknown Active   SYMBICORT 160-4.5 MCG/ACT Aerosol unknown Active   VENTOLIN  (90 Base) MCG/ACT Aero Soln inhalation aerosol unknown Active                ALLERGIES  Allergies   Allergen Reactions   • Citalopram Unspecified     Low sodium       PHYSICAL EXAM  VITAL SIGNS: BP  "152/90   Pulse 95   Temp 37.1 °C (98.8 °F)   Resp (!) 22   Ht 1.702 m (5' 7\")   Wt 46 kg (101 lb 6.6 oz)   SpO2 99%   BMI 15.88 kg/m²   Pulse ox interpretation: I interpret this pulse ox as normal.  Constitutional: Alert, Moderately distressed, agitated, clearly hallucinating. Cachectic.  HENT: No signs of trauma, Bilateral external ears normal, Nose normal.   Eyes: Conjunctiva normal, Non-icteric.   Neck: Normal range of motion, Supple, No stridor.   Lymphatic: No lymphadenopathy noted.   Cardiovascular: Regular slightly tachycardic rate and rhythm, no murmurs.   Thorax & Lungs: Severely decreased air movement bilaterally, symmetrically. No evidence of respiratory distress.   Abdomen: Bowel sounds normal, Soft, No tenderness, No masses, No pulsatile masses. No peritoneal signs.  Skin: Warm, Dry, No erythema, No rash.   Extremities: Intact distal pulses, No edema, No cyanosis.  Musculoskeletal: Good range of motion in all major joints. No or major deformities noted.   Neurologic: Alert , Normal motor function, Normal sensory function, No focal deficits noted.   Psychiatric: Affect is agitated, tearful, emotional, hallucinating.    DIAGNOSTIC STUDIES / PROCEDURES      LABS  Labs Reviewed   CBC WITH DIFFERENTIAL - Abnormal; Notable for the following:        Result Value    WBC 20.2 (*)     RBC 3.93 (*)     Hemoglobin 11.9 (*)     Hematocrit 34.2 (*)     MPV 8.7 (*)     Neutrophils-Polys 84.70 (*)     Lymphocytes 5.70 (*)     Immature Granulocytes 1.00 (*)     Neutrophils (Absolute) 17.06 (*)     Monos (Absolute) 1.54 (*)     Immature Granulocytes (abs) 0.21 (*)     All other components within normal limits   COMP METABOLIC PANEL - Abnormal; Notable for the following:     Sodium 128 (*)     Chloride 94 (*)     Glucose 124 (*)     All other components within normal limits   URINALYSIS - Abnormal; Notable for the following:     Ketones Trace (*)     All other components within normal limits    Narrative:     " "Indication for culture:->Emergency Room Patient   VENOUS BLOOD GAS - Abnormal; Notable for the following:     Venous Bg Po2 23.2 (*)     All other components within normal limits   APTT - Abnormal; Notable for the following:     APTT 23.9 (*)     All other components within normal limits    Narrative:     If not done within the last 4 hours  Indicate which anticoagulants the patient is on:->NONE   LACTIC ACID   LACTIC ACID   URINE CULTURE(NEW)    Narrative:     Indication for culture:->Emergency Room Patient   BLOOD CULTURE    Narrative:     Per Hospital Policy: Only change Specimen Src: to \"Line\" if  specified by physician order.   BLOOD CULTURE    Narrative:     Per Hospital Policy: Only change Specimen Src: to \"Line\" if  specified by physician order.   ESTIMATED GFR   PROTHROMBIN TIME    Narrative:     If not done within the last 4 hours  Indicate which anticoagulants the patient is on:->NONE   BLOOD CULTURE   BLOOD CULTURE   URINALYSIS   CULTURE RESPIRATORY W/ GRM STN   LACTIC ACID   PROCALCITONIN     All labs reviewed by me.    RADIOLOGY  DX-CHEST-PORTABLE (1 VIEW)   Final Result      No evidence of acute cardiopulmonary process.        The radiologist's interpretation of all radiological studies have been reviewed by me.    COURSE & MEDICAL DECISION MAKING  Nursing notes, VS, PMSFHx reviewed in chart.     5:52 PM Patient seen and examined at bedside. Differential diagnosis includes but is not limited toDelirium, dementia, medication reaction such as to steroids, sepsis, end-stage COPD. Ordered for sepsis protocol labs to evaluate. Patient will be treated with Haldol, Benadryl, supplemental oxygen, and a small fluid bolus for her symptoms.     6:54 PM This patient had previously pulled out her IV. She had some intramuscular Haldol but is still too agitated to keep a line in. She also needs to be contrast, since her chest x-ray is non-diagnostic in terms of her leukocytosis and she may have a urinary tract " infection. She is receiving an additional dose of 2.5 mg of intramuscular Haldol to facilitate her care. He is also not able to go home and her current state. I think a hospice consultation may be indicated as well. Although her workup is still incomplete, was able to speak with the admitting hospitalist, Dr. Enriquez, who will admit the patient to his service. He understands that some of her evaluation is still pending at her leukocytosis is as yet unexplained.    8:38 PM this patient's evaluation in the emergency department is unremarkable in terms of a clear explanation for her delirium or leukocytosis. If she is dehydrated, she may still have a significant lung infection, which will see over the course of the next day or so. I discussed with Dr. Enriquez whether or not a lumbar puncture is appropriate. However, without fever or any signs of neck stiffness, there is no strong reason to suspect meningitis. Further, the inpatient antibiotics he has started would cover meningitis. Encephalitis seems more likely, which would not be apparent on the results of the lumbar puncture. The patient is admitted to the hospitalist service in guarded condition.      DISPOSITION:  Patient will be admitted to Dr. Enriquez in guarded condition.      FINAL IMPRESSION  1. COPD exacerbation (CMS-[facility])    2. Delirium    3. Leukocytosis, unspecified type    4. Hallucination

## 2017-11-06 NOTE — DISCHARGE SUMMARY
CHIEF COMPLAINT ON ADMISSION  Chief Complaint   Patient presents with   • Altered Mental Status       CODE STATUS  Comfort Care/DNR    HPI & HOSPITAL COURSE  This is a 77 y.o. female with O2 dependent end stage COPD here w/ 5th acute decompensation since early September. She presented w/ same and sepsis. Son was looking into Hospice as patient was with poor quality of life but had not gotten certification from Primary care for Hospice as yet. We had a long discussion about the severity of her COPD and that her quality of life and lungs would not improve. He made the decision for comfort care. Patient was placed on Morphine drip and after discontinuation of oxygen this morning she .    Cause of Death  Acute on chronic respiratory failure w/ Hypoxemia  COPD exacerbation    Contributing factors- sepsis  Possible pneumonia    Tobacco related Yes      DISCHARGE PROBLEM LIST  Active Problems:    COPD exacerbation (CMS-[facility]) POA: Yes    Hyponatremia POA: Yes    HTN (hypertension) (Chronic) POA: Yes    Counseling regarding advanced directives POA: Yes    Normocytic anemia POA: Yes    Sepsis (CMS-[facility]) POA: Yes    Protein-calorie malnutrition, severe (CMS-[facility]) POA: Yes    Acute on chronic respiratory failure with hypoxemia (CMS-[facility]) POA: Unknown  Resolved Problems:    * No resolved hospital problems. *            Total time of the discharge process exceeds 32 minutes

## 2017-11-06 NOTE — DISCHARGE PLANNING
Received choice form from [facility] for patients hospice services, referral has been sent to [facility] per patient request.

## 2017-11-06 NOTE — DISCHARGE PLANNING
SHAAN spoke with pt son Yoav. It is reported that pt and family would like [facility]. SHAAN faxed choice form to [facility].

## 2017-11-06 NOTE — ASSESSMENT & PLAN NOTE
Extensive discussion with son in close family friend this morning  See interval  Morphine drip and prn Ativan

## 2017-11-06 NOTE — PROGRESS NOTES
[facility] Hospitalist Progress Note    Date of Service: 2017    Chief Complaint  77 y.o. 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    Interval Problem Update  40 minutes were spent at the bedside with her son, and a good family friend this morning to discuss her overall clinical situation and prognosis. Patient's son was planning to meet with hospice tomorrow but would require further documentation from their primary care provider the patient was hospice appropriate. She has been admitted multiple times with COPD and pneumonia over the last several weeks, she has a component of anxiety which worsens her dyspnea. She was frequently agitated last night requiring Ativan which was given earlier this morning. The son works full time and has no other caregivers or family to assist him with care of the patient. I explained the patient's COPD is terminal and nonreversible and her quality of life is not likely to improve nor is her lung function going to improve. After prolonged discussion he decided to move to comfort care provisions here in the hospital, patient does not seem to be imminently dying we will need to consider transition to home hospice, however he only has 2 weeks of page leave available. I reassured him that I don't think it will take that long, after moving to comfort care measures he has decided that he does not wish to be at her bedside when she expires. Discussed plan of care changes extensively with patient's nurse.  In addition to this discussion and additional 35-to 40 minutes was utilized in reviewing patient's records, radiology, labs, and history-appropriate changes to care plan were also initiated.    Consultants/Specialty  None    Disposition  TBD-may  here versus home with hospice.        Review of Systems   Unable to perform ROS: Patient unresponsive      Physical Exam  Laboratory/Imaging    Hemodynamics  Temp (24hrs), Av.2 °C (97.1 °F), Min:34.6 °C (94.2 °F), Max:37.1 °C (98.8 °F)   Temperature: 37 °C (98.6 °F)  Pulse  Av.6  Min: 72  Max: 114 Heart Rate (Monitored): (!) 108  Blood Pressure : 155/84, NIBP: 143/93      Respiratory      Respiration: 20, Pulse Oximetry: 95 %, O2 Daily Delivery Respiratory : Silicone Nasal Cannula     Given By:: Mouthpiece, Work Of Breathing / Effort: Mild  RUL Breath Sounds: Diminished, RML Breath Sounds: Diminished, RLL Breath Sounds: Diminished, RIAZ Breath Sounds: Diminished, LLL Breath Sounds: Diminished    Fluids    Intake/Output Summary (Last 24 hours) at 17 1627  Last data filed at 17 0800   Gross per 24 hour   Intake             1880 ml   Output              450 ml   Net             1430 ml       Nutrition  Orders Placed This Encounter   Procedures   • DIET NPO     Standing Status:   Standing     Number of Occurrences:   1     Order Specific Question:   Restrict to:     Answer:   Strict [1]     Comments:   Pt failed Dysphasia Screaning by RN; follow up with [facility]     Physical Exam    Recent Labs      17   1721  17   0143   WBC  20.2*  20.1*   RBC  3.93*  3.58*   HEMOGLOBIN  11.9*  10.9*   HEMATOCRIT  34.2*  32.0*   MCV  87.0  89.4   MCH  30.3  30.4   MCHC  34.8  34.1   RDW  40.1  41.8   PLATELETCT  308  226   MPV  8.7*  8.6*     Recent Labs      17   1721  17   0143   SODIUM  128*  130*   POTASSIUM  3.6  3.3*   CHLORIDE  94*  98   CO2  24  20   GLUCOSE  124*  123*   BUN  15  27*   CREATININE  0.67  0.93   CALCIUM  9.2  8.4*     Recent Labs      17   172   APTT  23.9*   INR  0.94                  Assessment/Plan     COPD exacerbation (CMS-HCC)- (present on admission)   Assessment & Plan    C&C        Hyponatremia- (present on admission)   Assessment & Plan    - Likely due to dehydration, also with hypochloremia  IVF d/c C&C        Protein-calorie malnutrition, severe (CMS-HCC)- (present on admission)    Assessment & Plan    Patient is terminal        Sepsis (CMS-HCC)- (present on admission)   Assessment & Plan    - This is severe sepsis with the following associated acute organ dysfunction(s): metabolic/septic encephalopathy.   Patient is now care and comfort          Normocytic anemia- (present on admission)   Assessment & Plan    Care and comfort        Counseling regarding advanced directives- (present on admission)   Assessment & Plan    Extensive discussion with son in close family friend this morning  See interval  Morphine drip and prn Ativan        HTN (hypertension)- (present on admission)   Assessment & Plan    - Continue home metoprolol and lisinopril  - Adjust as needed          Quality-Core Measures

## 2017-11-06 NOTE — PROGRESS NOTES
Patient noted to not be breathing by Charge RN at 1435. Patient apneic. This RN and Charge RN performed pronouncement. No heart sounds, lung sounds, radial pulse, or pupillary reactivity. Pronounced by two RN's at 1435. Son was notified in person at 1440 when he came to visit. Dr. Xavier was notified at 1436 by phone.  and [facility] notified. Not a case for either. IV removed, and body was cleaned. Transport will be called.

## 2017-11-06 NOTE — CARE PLAN
Problem: Knowledge Deficit  Goal: Knowledge of disease process/condition, treatment plan, diagnostic tests, and medications will improve  Outcome: PROGRESSING SLOWER THAN EXPECTED  Pt on comfort care    Problem: Pain Management  Goal: Pain level will decrease to patient's comfort goal  Outcome: PROGRESSING AS EXPECTED  Morphine pca in place

## 2017-11-06 NOTE — PROGRESS NOTES
Pt resting in bed. PCA in use. Pt no longer restless, but continues to be tachypnic. Will continue to monitor.

## 2017-11-06 NOTE — PROGRESS NOTES
Assumed care of patient. Patient will wake up and say hello when her name is mentioned, but quickly closes eyes and falls asleep.  Breathing is tachypneic and irregular. Patient sating at 67% on room air, sating 80% on 2L oxygen; oxygen on per family request. Patient able to state no to pain. Morphine PCA with basal in place. No family at bedside.

## 2017-11-06 NOTE — CARE PLAN
Problem: Pain Management  Goal: Pain level will decrease to patient's comfort goal  Morphine PCA with basal in place.     Problem: Respiratory:  Goal: Respiratory status will improve  Patient sating 67% on room air. Patient tachypneic and with irregular breathing.

## 2017-11-07 NOTE — ADDENDUM NOTE
Encounter addended by: Brittany Mcmahon on: 11/7/2017  3:10 PM<BR>    Actions taken: Flowsheet accepted

## 2017-11-07 NOTE — PROGRESS NOTES
Transport here to take patient to [facility]. ID bracelet on left wrist.

## 2017-11-09 LAB
BACTERIA BLD CULT: NORMAL
BACTERIA BLD CULT: NORMAL
SIGNIFICANT IND 70042: NORMAL
SIGNIFICANT IND 70042: NORMAL
SITE SITE: NORMAL
SITE SITE: NORMAL
SOURCE SOURCE: NORMAL
SOURCE SOURCE: NORMAL

## 2018-09-09 NOTE — CARE PLAN
Problem: Nutritional:  Goal: Achieve adequate nutritional intake  Patient will consume 50% of meals   Outcome: PROGRESSING AS EXPECTED    Intervention: Monitor PO intake, weights, and laboratory values  Pt is consuming 25-75% of meals.          No

## 2018-12-18 NOTE — ED PROVIDER NOTES
ED Provider Note    Scribed for Dr. Daren Toney M.D. by Jose Ponce. 9/8/2017  6:10 PM    Primary care provider: Mireille Benito M.D.  Means of arrival: Ambulance  History obtained from: Patient  History limited by: None    CHIEF COMPLAINT  Chief Complaint   Patient presents with   • Shortness of Breath       HPI  Yoko Mistry is a 77 y.o. female who presents to the Emergency Department complaining of shortness of breath onset yesterday.She was previously in the [facility] ED on 9/3/17 for several days with a diagnosis of PNA, however she was discharged with antibiotics. She recalls possibly taking some of her antibiotics this morning. The patient reports her symptoms as worsening which prompted her to come back to the ED. She states she uses oxygen full time at home. She denies fever.    REVIEW OF SYSTEMS  Pertinent positives include shortness of breath. Pertinent negatives include no fever.   As above, all other systems reviewed and are negative. C.   See HPI for further details.     PAST MEDICAL HISTORY   has a past medical history of Anxiety disorder (7/11/2011); Chickenpox; COPD; COPD (chronic obstructive pulmonary disease) (CMS-HCC) (7/11/2011); Depression; Finnish measles; HTN (hypertension) (7/11/2011); Hyperlipidemia; Hypertension; Influenza; Macular degeneration (02/17); Mumps; and Smallpox.    SURGICAL HISTORY   has a past surgical history that includes femoral popliteal angioplasty with stent (2009); cholecystectomy; abdominal hysterectomy total; hysterectomy laparoscopy; and cardiovascular.    SOCIAL HISTORY  Social History   Substance Use Topics   • Smoking status: Current Every Day Smoker     Packs/day: 0.25     Years: 40.00     Types: Cigarettes   • Smokeless tobacco: Never Used      Comment: Down to 5 cigs a day per patient  Started in her 30s   • Alcohol use No      History   Drug Use No       FAMILY HISTORY  Family History   Problem Relation Age of Onset   • Heart Disease Mother      aortic  stenosis   • Heart Disease Father    • Cancer Brother        CURRENT MEDICATIONS  No current facility-administered medications on file prior to encounter.      Current Outpatient Prescriptions on File Prior to Encounter   Medication Sig Dispense Refill   • tiotropium (SPIRIVA) 18 MCG Cap Inhale 1 Cap by mouth every day. 30 Cap 0   • doxycycline monohydrate (ADOXA) 100 MG tablet Take 1 Tab by mouth every 12 hours. 6 Tab 0   • cefdinir (OMNICEF) 300 MG Cap Take 1 Cap by mouth every 12 hours. 6 Cap 0   • amlodipine (NORVASC) 5 MG Tab Take 2 Tabs by mouth every day. 30 Tab 0   • clonazepam (KLONOPIN) 0.5 MG Tab Take 0.25 mg by mouth every bedtime.     • ipratropium-albuterol (DUONEB) 0.5-2.5 (3) MG/3ML nebulizer solution INHALE 1 VIAL VIA NEBULIZER 4 TIMES A DAY 90 mL 5   • busPIRone (BUSPAR) 5 MG Tab Take 1 Tab by mouth every day. 30 Tab 5   • SYMBICORT 160-4.5 MCG/ACT Aerosol INHALE 2 PUFFS BY MOUTH 2 TIMES A DAY. USE SPACER. RINSE MOUTH AFTER EACH USE. 1 Inhaler 5   • metoprolol SR (TOPROL XL) 50 MG TABLET SR 24 HR TAKE 1 TABLET BY MOUTH DAILY 90 Tab 0   • lisinopril (PRINIVIL) 20 MG Tab TAKE 1 TABLET BY MOUTH 2 TIMES A  Tab 1   • albuterol (VENTOLIN HFA) 108 (90 BASE) MCG/ACT Aero Soln inhalation aerosol Inhale 2 Puffs by mouth every 6 hours as needed for Shortness of Breath. 1 Inhaler 2   • GARLIC PO Take 1 tablet by mouth every day.     • simvastatin (ZOCOR) 20 MG Tab Take 20 mg by mouth every evening.     • aspirin EC (ECOTRIN) 81 MG TBEC Take 81 mg by mouth every day.            ALLERGIES  Allergies   Allergen Reactions   • Citalopram Unspecified     Low sodium       PHYSICAL EXAM  VITAL SIGNS: BP (!) 179/164   Pulse 83   Temp 37.5 °C (99.5 °F)   Resp (!) 39   SpO2 100%     Constitutional: Well developed, Well nourished,  distress, Non-toxic appearance.   HENT: Normocephalic, Atraumatic, Bilateral external ears normal, dry mucous membranes, Oropharynx dry, No oral exudates.   Eyes: PERRLA, EOMI,  Conjunctiva normal, No discharge.   Neck: No tenderness, Supple, No stridor.   Lymphatic: No lymphadenopathy noted.   Cardiovascular: Normal heart rate, Normal rhythm.   Thorax & Lungs: Diminished breath sounds, slight shortness of breath, No wheezing, No crackles.   Abdomen: Soft, No tenderness, No masses, No pulsatile masses.   Skin: Warm, Dry, No erythema, No rash.   Extremities:, No edema No cyanosis.   Musculoskeletal: No tenderness to palpation or major deformities noted.  Intact distal pulses  Neurologic: Awake, alert. Moves all extremities spontaneously.      LABS  Results for orders placed or performed during the hospital encounter of 09/08/17   Lactic acid (lactate)   Result Value Ref Range    Lactic Acid 1.0 0.5 - 2.0 mmol/L   CBC WITH DIFFERENTIAL   Result Value Ref Range    WBC 14.4 (H) 4.8 - 10.8 K/uL    RBC 4.99 4.20 - 5.40 M/uL    Hemoglobin 14.7 12.0 - 16.0 g/dL    Hematocrit 42.8 37.0 - 47.0 %    MCV 85.8 81.4 - 97.8 fL    MCH 29.5 27.0 - 33.0 pg    MCHC 34.3 33.6 - 35.0 g/dL    RDW 38.7 35.9 - 50.0 fL    Platelet Count 309 164 - 446 K/uL    MPV 9.7 9.0 - 12.9 fL    Neutrophils-Polys 73.60 (H) 44.00 - 72.00 %    Lymphocytes 15.70 (L) 22.00 - 41.00 %    Monocytes 9.70 0.00 - 13.40 %    Eosinophils 0.30 0.00 - 6.90 %    Basophils 0.10 0.00 - 1.80 %    Immature Granulocytes 0.60 0.00 - 0.90 %    Nucleated RBC 0.00 /100 WBC    Neutrophils (Absolute) 10.58 (H) 2.00 - 7.15 K/uL    Lymphs (Absolute) 2.25 1.00 - 4.80 K/uL    Monos (Absolute) 1.39 (H) 0.00 - 0.85 K/uL    Eos (Absolute) 0.04 0.00 - 0.51 K/uL    Baso (Absolute) 0.02 0.00 - 0.12 K/uL    Immature Granulocytes (abs) 0.09 0.00 - 0.11 K/uL    NRBC (Absolute) 0.00 K/uL       All labs reviewed by me.      RADIOLOGY  DX-CHEST-PORTABLE (1 VIEW)   Final Result      1.  Hyperinflation consistent with COPD.   2.  No pneumonia or pneumothorax.        The radiologist's interpretation of all radiological studies have been reviewed by me.    COURSE & MEDICAL  DECISION MAKING  Pertinent Labs & Imaging studies reviewed. (See chart for details)    6:10 PM - Patient seen and examined at bedside. Patient will be treated with albuterol 2.5mb/0.5ml. Ordered lactic acid, Oxygen therapy, cardiac monitoring, CBC, CMP, urinalysis, urine culture, blood culture, and chest x-ray  to evaluate her symptoms. The differential diagnoses include but are not limited to: COPD exacerbation, pneumonia, sepsis.    7:27 PM Paged Hospitalist.    Decision Making:  Worsening copd exacerbation, pneumonia improved. Not tolerating at home although not a lot to offer treatment wise. May need hospice, discussed with rajt about admission    DISPOSITION:  Patient will be admitted to Dr. Duggan in guarded condition.     FINAL IMPRESSION  1. Acute exacerbation of chronic obstructive pulmonary disease (COPD) (CMS-[facility])        Jose SALAS (Scribe), am scribing for, and in the presence of, Daren Toney M.D..    Electronically signed by: Jose Ponce (Barbaraibe), 9/8/2017    Daren SALAS M.D. personally performed the services described in this documentation, as scribed by Jose Ponce in my presence, and it is both accurate and complete.    The note accurately reflects work and decisions made by me.  Daren Toney  9/8/2017  8:08 PM     Yes

## 2022-04-02 NOTE — CARE PLAN
Problem: Nutritional:  Goal: Achieve adequate nutritional intake  Patient will consume 50% of meals  Outcome: NOT MET         unknown

## 2023-04-21 NOTE — CARE PLAN
Problem: Communication  Goal: The ability to communicate needs accurately and effectively will improve  Outcome: PROGRESSING AS EXPECTED  Ongoing communication concerning tests, labs, procedures and disease process.       Problem: Safety  Goal: Will remain free from falls  Outcome: PROGRESSING AS EXPECTED  Hourly rounding, bed low and locked. Call light within reach. Alarms on for safety.         My signature below certifies that the above stated patient is homebound and upon completion of the Face-To-Face encounter, has the need for intermittent skilled nursing, physical therapy and/or speech or occupational therapy services in their home for their current diagnosis as outlined in their initial plan of care. These services will continue to be monitored by myself or another physician.

## 2023-10-06 NOTE — CARE PLAN
Problem: Venous Thromboembolism (VTW)/Deep Vein Thrombosis (DVT) Prevention:  Goal: Patient will participate in Venous Thrombosis (VTE)/Deep Vein Thrombosis (DVT)Prevention Measures  Outcome: PROGRESSING AS EXPECTED  Pt receiving lovenox for VTE.       Cimzia Pregnancy And Lactation Text: This medication crosses the placenta but can be considered safe in certain situations. Cimzia may be excreted in breast milk.

## 2025-03-22 NOTE — THERAPY
"Occupational Therapy Evaluation completed.   Functional Status:  Pt seen today for OT evaluation. Pt pleasant, cooperative however with impaired safety awareness. Pt does not want to use FWW, despite her needing to hold onto therapist/furniture without one. Pt requiring frequent rest breaks due to SOB. Pt sat at sink for grooming as she was unable to tolerate standing. Pt limited by weakness, fatigue, and impaired balance but may be close to functional baseline.  Plan of Care: Will benefit from Occupational Therapy 2 times per week, 1-2 more tx for energy conservation/AE training  Discharge Recommendations:  Equipment: Front-Wheel Walker. Discharge to home with outpatient or home health for additional skilled therapy services.    See \"Rehab Therapy-Acute\" Patient Summary Report for complete documentation.    "
"Physical Therapy Evaluation completed.   Bed Mobility:  Supine to Sit: Supervised  Transfers: Sit to Stand: Supervised  Gait: Level Of Assist: Supervised with Front-Wheel Walker       Plan of Care: Patient with no further skilled PT needs in the acute care setting at this time  Discharge Recommendations: Equipment: 4-Wheel Walker. Post-acute therapy Discharge to home with outpatient or home health for additional skilled therapy services.    See \"Rehab Therapy-Acute\" Patient Summary Report for complete documentation.     "
Opt out